# Patient Record
Sex: MALE | Race: WHITE | Employment: FULL TIME | URBAN - NONMETROPOLITAN AREA
[De-identification: names, ages, dates, MRNs, and addresses within clinical notes are randomized per-mention and may not be internally consistent; named-entity substitution may affect disease eponyms.]

---

## 2017-02-06 ENCOUNTER — TELEPHONE (OUTPATIENT)
Dept: FAMILY MEDICINE CLINIC | Age: 52
End: 2017-02-06

## 2017-02-07 ENCOUNTER — OFFICE VISIT (OUTPATIENT)
Dept: FAMILY MEDICINE CLINIC | Age: 52
End: 2017-02-07

## 2017-02-07 ENCOUNTER — TELEPHONE (OUTPATIENT)
Dept: FAMILY MEDICINE CLINIC | Age: 52
End: 2017-02-07

## 2017-02-07 VITALS
RESPIRATION RATE: 16 BRPM | TEMPERATURE: 98.1 F | WEIGHT: 235 LBS | SYSTOLIC BLOOD PRESSURE: 104 MMHG | HEART RATE: 88 BPM | BODY MASS INDEX: 34.7 KG/M2 | DIASTOLIC BLOOD PRESSURE: 64 MMHG

## 2017-02-07 DIAGNOSIS — R05.9 COUGH: ICD-10-CM

## 2017-02-07 DIAGNOSIS — J20.9 ACUTE BRONCHITIS, UNSPECIFIED ORGANISM: Primary | ICD-10-CM

## 2017-02-07 DIAGNOSIS — R68.83 CHILLS: ICD-10-CM

## 2017-02-07 LAB
INFLUENZA A ANTIBODY: NEGATIVE
INFLUENZA B ANTIBODY: NEGATIVE

## 2017-02-07 PROCEDURE — 99213 OFFICE O/P EST LOW 20 MIN: CPT | Performed by: FAMILY MEDICINE

## 2017-02-07 PROCEDURE — 87804 INFLUENZA ASSAY W/OPTIC: CPT | Performed by: FAMILY MEDICINE

## 2017-02-07 RX ORDER — AZITHROMYCIN 250 MG/1
TABLET, FILM COATED ORAL
Qty: 1 PACKET | Refills: 0 | Status: SHIPPED | OUTPATIENT
Start: 2017-02-07 | End: 2017-02-17

## 2017-02-07 ASSESSMENT — ENCOUNTER SYMPTOMS
SORE THROAT: 1
VOMITING: 0
COUGH: 1
DIARRHEA: 0
RHINORRHEA: 1
NAUSEA: 0
WHEEZING: 0
ABDOMINAL PAIN: 0
SINUS PRESSURE: 0

## 2017-02-28 DIAGNOSIS — R79.89 LOW TESTOSTERONE: ICD-10-CM

## 2017-03-09 ENCOUNTER — TELEPHONE (OUTPATIENT)
Dept: FAMILY MEDICINE CLINIC | Age: 52
End: 2017-03-09

## 2017-03-10 ENCOUNTER — OFFICE VISIT (OUTPATIENT)
Dept: FAMILY MEDICINE CLINIC | Age: 52
End: 2017-03-10

## 2017-03-10 VITALS
TEMPERATURE: 98.6 F | SYSTOLIC BLOOD PRESSURE: 118 MMHG | RESPIRATION RATE: 20 BRPM | HEIGHT: 70 IN | HEART RATE: 104 BPM | DIASTOLIC BLOOD PRESSURE: 82 MMHG | WEIGHT: 229.8 LBS | BODY MASS INDEX: 32.9 KG/M2

## 2017-03-10 DIAGNOSIS — J02.9 EXUDATIVE PHARYNGITIS: Primary | ICD-10-CM

## 2017-03-10 DIAGNOSIS — R05.9 COUGH: ICD-10-CM

## 2017-03-10 DIAGNOSIS — R00.0 TACHYCARDIA: ICD-10-CM

## 2017-03-10 DIAGNOSIS — J01.40 ACUTE PANSINUSITIS, RECURRENCE NOT SPECIFIED: ICD-10-CM

## 2017-03-10 DIAGNOSIS — J30.9 ALLERGIC RHINITIS, UNSPECIFIED ALLERGIC RHINITIS TRIGGER, UNSPECIFIED RHINITIS SEASONALITY: ICD-10-CM

## 2017-03-10 PROCEDURE — 99213 OFFICE O/P EST LOW 20 MIN: CPT | Performed by: NURSE PRACTITIONER

## 2017-03-10 RX ORDER — BENZONATATE 100 MG/1
100 CAPSULE ORAL 3 TIMES DAILY PRN
Qty: 21 CAPSULE | Refills: 0 | Status: SHIPPED | OUTPATIENT
Start: 2017-03-10 | End: 2017-03-17

## 2017-03-10 RX ORDER — CEFDINIR 300 MG/1
300 CAPSULE ORAL 2 TIMES DAILY
Qty: 20 CAPSULE | Refills: 0 | Status: SHIPPED | OUTPATIENT
Start: 2017-03-10 | End: 2017-03-20

## 2017-03-10 ASSESSMENT — ENCOUNTER SYMPTOMS
ANAL BLEEDING: 0
SINUS PRESSURE: 1
TROUBLE SWALLOWING: 0
APNEA: 0
NAUSEA: 0
RHINORRHEA: 0
COUGH: 1
BLOOD IN STOOL: 0
CONSTIPATION: 0
ABDOMINAL DISTENTION: 0
DIARRHEA: 0
ABDOMINAL PAIN: 0
SORE THROAT: 1
WHEEZING: 0
VOICE CHANGE: 1
PHOTOPHOBIA: 0
VOMITING: 0
SHORTNESS OF BREATH: 0

## 2017-03-15 ENCOUNTER — TELEPHONE (OUTPATIENT)
Dept: FAMILY MEDICINE CLINIC | Age: 52
End: 2017-03-15

## 2017-03-17 ENCOUNTER — OFFICE VISIT (OUTPATIENT)
Dept: FAMILY MEDICINE CLINIC | Age: 52
End: 2017-03-17

## 2017-03-17 VITALS
SYSTOLIC BLOOD PRESSURE: 122 MMHG | TEMPERATURE: 98.5 F | RESPIRATION RATE: 20 BRPM | WEIGHT: 234.6 LBS | HEART RATE: 108 BPM | HEIGHT: 70 IN | BODY MASS INDEX: 33.58 KG/M2 | DIASTOLIC BLOOD PRESSURE: 66 MMHG

## 2017-03-17 DIAGNOSIS — J32.9 RECURRENT SINUSITIS: Primary | ICD-10-CM

## 2017-03-17 DIAGNOSIS — J02.9 SORE THROAT: ICD-10-CM

## 2017-03-17 PROCEDURE — 99213 OFFICE O/P EST LOW 20 MIN: CPT | Performed by: FAMILY MEDICINE

## 2017-03-17 ASSESSMENT — ENCOUNTER SYMPTOMS
GASTROINTESTINAL NEGATIVE: 1
SORE THROAT: 1
SINUS PRESSURE: 1
TROUBLE SWALLOWING: 0
COUGH: 1

## 2017-03-20 ENCOUNTER — TELEPHONE (OUTPATIENT)
Dept: FAMILY MEDICINE CLINIC | Age: 52
End: 2017-03-20

## 2017-03-20 DIAGNOSIS — J34.9 PARANASAL SINUS DISEASE: Primary | ICD-10-CM

## 2017-03-20 DIAGNOSIS — J32.9 RECURRENT SINUSITIS: ICD-10-CM

## 2017-04-04 LAB
CHOLESTEROL, TOTAL: 162 MG/DL
CHOLESTEROL/HDL RATIO: NORMAL
HDLC SERPL-MCNC: 45 MG/DL (ref 35–70)
LDL CHOLESTEROL CALCULATED: 76 MG/DL (ref 0–160)
TRIGL SERPL-MCNC: 204 MG/DL
VLDLC SERPL CALC-MCNC: NORMAL MG/DL

## 2017-06-01 DIAGNOSIS — I10 ESSENTIAL HYPERTENSION: ICD-10-CM

## 2017-06-01 DIAGNOSIS — E78.00 PURE HYPERCHOLESTEROLEMIA: ICD-10-CM

## 2017-06-01 RX ORDER — SIMVASTATIN 40 MG
40 TABLET ORAL NIGHTLY
Qty: 90 TABLET | Refills: 3 | Status: SHIPPED | OUTPATIENT
Start: 2017-06-01 | End: 2017-06-28 | Stop reason: SDUPTHER

## 2017-06-01 RX ORDER — LOSARTAN POTASSIUM AND HYDROCHLOROTHIAZIDE 12.5; 1 MG/1; MG/1
1 TABLET ORAL DAILY
Qty: 90 TABLET | Refills: 3 | Status: SHIPPED | OUTPATIENT
Start: 2017-06-01 | End: 2017-06-28 | Stop reason: SDUPTHER

## 2017-06-23 ENCOUNTER — OFFICE VISIT (OUTPATIENT)
Dept: FAMILY MEDICINE CLINIC | Age: 52
End: 2017-06-23

## 2017-06-23 VITALS
HEART RATE: 88 BPM | DIASTOLIC BLOOD PRESSURE: 68 MMHG | HEIGHT: 69 IN | TEMPERATURE: 97.9 F | WEIGHT: 231 LBS | RESPIRATION RATE: 16 BRPM | SYSTOLIC BLOOD PRESSURE: 116 MMHG | BODY MASS INDEX: 34.21 KG/M2

## 2017-06-23 DIAGNOSIS — E66.9 OBESITY (BMI 30.0-34.9): ICD-10-CM

## 2017-06-23 DIAGNOSIS — F41.9 ANXIETY: ICD-10-CM

## 2017-06-23 DIAGNOSIS — Z00.00 ANNUAL PHYSICAL EXAM: Primary | ICD-10-CM

## 2017-06-23 PROBLEM — E66.811 OBESITY (BMI 30.0-34.9): Status: ACTIVE | Noted: 2017-06-23

## 2017-06-23 PROCEDURE — 99396 PREV VISIT EST AGE 40-64: CPT | Performed by: FAMILY MEDICINE

## 2017-06-23 RX ORDER — ESCITALOPRAM OXALATE 10 MG/1
10 TABLET ORAL DAILY
Qty: 90 TABLET | Refills: 3 | Status: SHIPPED | OUTPATIENT
Start: 2017-06-23 | End: 2017-06-28 | Stop reason: SDUPTHER

## 2017-06-23 ASSESSMENT — ENCOUNTER SYMPTOMS
SHORTNESS OF BREATH: 0
EYES NEGATIVE: 1
BLOOD IN STOOL: 0
ABDOMINAL PAIN: 0
CHEST TIGHTNESS: 0

## 2017-06-28 ENCOUNTER — TELEPHONE (OUTPATIENT)
Dept: FAMILY MEDICINE CLINIC | Age: 52
End: 2017-06-28

## 2017-06-28 DIAGNOSIS — F41.9 ANXIETY: ICD-10-CM

## 2017-06-28 DIAGNOSIS — I10 ESSENTIAL HYPERTENSION: ICD-10-CM

## 2017-06-28 DIAGNOSIS — E78.00 PURE HYPERCHOLESTEROLEMIA: ICD-10-CM

## 2017-06-28 RX ORDER — ESCITALOPRAM OXALATE 10 MG/1
10 TABLET ORAL DAILY
Qty: 90 TABLET | Refills: 3 | Status: SHIPPED | OUTPATIENT
Start: 2017-06-28 | End: 2017-06-28 | Stop reason: SDUPTHER

## 2017-06-28 RX ORDER — SIMVASTATIN 40 MG
40 TABLET ORAL NIGHTLY
Qty: 90 TABLET | Refills: 3 | Status: SHIPPED | OUTPATIENT
Start: 2017-06-28 | End: 2017-06-28 | Stop reason: SDUPTHER

## 2017-06-28 RX ORDER — LOSARTAN POTASSIUM AND HYDROCHLOROTHIAZIDE 12.5; 1 MG/1; MG/1
1 TABLET ORAL DAILY
Qty: 90 TABLET | Refills: 3 | Status: SHIPPED | OUTPATIENT
Start: 2017-06-28 | End: 2017-06-28 | Stop reason: SDUPTHER

## 2017-06-28 RX ORDER — ESCITALOPRAM OXALATE 10 MG/1
10 TABLET ORAL DAILY
Qty: 30 TABLET | Refills: 0 | Status: SHIPPED | OUTPATIENT
Start: 2017-06-28 | End: 2018-06-14 | Stop reason: SDUPTHER

## 2017-06-28 RX ORDER — SIMVASTATIN 40 MG
40 TABLET ORAL NIGHTLY
Qty: 30 TABLET | Refills: 0 | Status: SHIPPED | OUTPATIENT
Start: 2017-06-28 | End: 2018-05-01 | Stop reason: SDUPTHER

## 2017-06-28 RX ORDER — LOSARTAN POTASSIUM AND HYDROCHLOROTHIAZIDE 12.5; 1 MG/1; MG/1
1 TABLET ORAL DAILY
Qty: 30 TABLET | Refills: 0 | Status: SHIPPED | OUTPATIENT
Start: 2017-06-28 | End: 2018-08-13 | Stop reason: SDUPTHER

## 2017-07-14 ENCOUNTER — OFFICE VISIT (OUTPATIENT)
Dept: FAMILY MEDICINE CLINIC | Age: 52
End: 2017-07-14

## 2017-07-14 ENCOUNTER — TELEPHONE (OUTPATIENT)
Dept: UROLOGY | Age: 52
End: 2017-07-14

## 2017-07-14 VITALS
TEMPERATURE: 98.1 F | DIASTOLIC BLOOD PRESSURE: 68 MMHG | SYSTOLIC BLOOD PRESSURE: 110 MMHG | HEIGHT: 70 IN | BODY MASS INDEX: 33.07 KG/M2 | WEIGHT: 231 LBS | RESPIRATION RATE: 16 BRPM | HEART RATE: 68 BPM

## 2017-07-14 DIAGNOSIS — Z12.5 SCREENING FOR PROSTATE CANCER: ICD-10-CM

## 2017-07-14 DIAGNOSIS — M48.061 LUMBAR SPINAL STENOSIS: Primary | ICD-10-CM

## 2017-07-14 DIAGNOSIS — I10 ESSENTIAL HYPERTENSION: ICD-10-CM

## 2017-07-14 DIAGNOSIS — Z01.818 PREOP EXAMINATION: ICD-10-CM

## 2017-07-14 DIAGNOSIS — R79.89 LOW TESTOSTERONE: Primary | ICD-10-CM

## 2017-07-14 PROCEDURE — 93000 ELECTROCARDIOGRAM COMPLETE: CPT | Performed by: FAMILY MEDICINE

## 2017-07-14 PROCEDURE — 99243 OFF/OP CNSLTJ NEW/EST LOW 30: CPT | Performed by: FAMILY MEDICINE

## 2017-07-14 ASSESSMENT — ENCOUNTER SYMPTOMS
BLOOD IN STOOL: 0
CHEST TIGHTNESS: 0
ABDOMINAL PAIN: 0
BACK PAIN: 1
EYES NEGATIVE: 1
SHORTNESS OF BREATH: 0

## 2017-07-15 ENCOUNTER — HOSPITAL ENCOUNTER (OUTPATIENT)
Age: 52
Discharge: HOME | End: 2017-07-15
Payer: COMMERCIAL

## 2017-07-15 LAB
HEMOGLOBIN: 16 GM/DL (ref 14–18)
PROSTATE SPECIFIC ANTIGEN: 0.68 NG/ML (ref 0–1)

## 2017-07-15 PROCEDURE — 84270 ASSAY OF SEX HORMONE GLOBUL: CPT

## 2017-07-15 PROCEDURE — 84403 ASSAY OF TOTAL TESTOSTERONE: CPT

## 2017-07-15 PROCEDURE — 84153 ASSAY OF PSA TOTAL: CPT

## 2017-07-15 PROCEDURE — 85018 HEMOGLOBIN: CPT

## 2017-07-15 PROCEDURE — 36415 COLL VENOUS BLD VENIPUNCTURE: CPT

## 2017-07-19 LAB — TESTOSTERONE FREE: NORMAL

## 2017-07-21 ENCOUNTER — OFFICE VISIT (OUTPATIENT)
Dept: UROLOGY | Age: 52
End: 2017-07-21
Payer: COMMERCIAL

## 2017-07-21 VITALS
BODY MASS INDEX: 34.36 KG/M2 | SYSTOLIC BLOOD PRESSURE: 128 MMHG | HEIGHT: 69 IN | WEIGHT: 232 LBS | DIASTOLIC BLOOD PRESSURE: 80 MMHG

## 2017-07-21 DIAGNOSIS — E29.1 HYPOGONADISM IN MALE: Primary | ICD-10-CM

## 2017-07-21 DIAGNOSIS — R79.89 LOW TESTOSTERONE: ICD-10-CM

## 2017-07-21 LAB
BILIRUBIN URINE: NEGATIVE
BLOOD URINE, POC: NEGATIVE
CHARACTER, URINE: CLEAR
COLOR, URINE: YELLOW
GLUCOSE URINE: NEGATIVE MG/DL
KETONES, URINE: NEGATIVE
LEUKOCYTE CLUMPS, URINE: NEGATIVE
NITRITE, URINE: NEGATIVE
PH, URINE: 6
PROTEIN, URINE: NEGATIVE MG/DL
SPECIFIC GRAVITY, URINE: 1.02 (ref 1–1.03)
UROBILINOGEN, URINE: 0.2 EU/DL

## 2017-07-21 PROCEDURE — 99213 OFFICE O/P EST LOW 20 MIN: CPT | Performed by: NURSE PRACTITIONER

## 2017-07-21 PROCEDURE — 81003 URINALYSIS AUTO W/O SCOPE: CPT | Performed by: NURSE PRACTITIONER

## 2017-07-21 RX ORDER — SYRINGE W-NEEDLE,DISPOSAB,3 ML 25GX5/8"
SYRINGE, EMPTY DISPOSABLE MISCELLANEOUS
Qty: 3 EACH | Refills: 11 | Status: SHIPPED | OUTPATIENT
Start: 2017-07-21 | End: 2018-07-30 | Stop reason: SDUPTHER

## 2017-07-21 RX ORDER — TESTOSTERONE CYPIONATE 200 MG/ML
300 INJECTION INTRAMUSCULAR SEE ADMIN INSTRUCTIONS
Qty: 10 ML | Refills: 5 | Status: SHIPPED | OUTPATIENT
Start: 2017-07-21 | End: 2017-12-12 | Stop reason: SDUPTHER

## 2017-11-20 ENCOUNTER — HOSPITAL ENCOUNTER (OUTPATIENT)
Age: 52
Discharge: HOME | End: 2017-11-20
Payer: COMMERCIAL

## 2017-11-20 DIAGNOSIS — E29.1 HYPOGONADISM IN MALE: ICD-10-CM

## 2017-11-20 DIAGNOSIS — R79.89 LOW TESTOSTERONE: ICD-10-CM

## 2017-11-20 LAB
HEMOGLOBIN: 17.1 GM/DL (ref 14–18)
PROSTATE SPECIFIC ANTIGEN: 0.52 NG/ML (ref 0–1)

## 2017-11-20 PROCEDURE — 84403 ASSAY OF TOTAL TESTOSTERONE: CPT

## 2017-11-20 PROCEDURE — 85018 HEMOGLOBIN: CPT

## 2017-11-20 PROCEDURE — 84153 ASSAY OF PSA TOTAL: CPT

## 2017-11-20 PROCEDURE — 36415 COLL VENOUS BLD VENIPUNCTURE: CPT

## 2017-11-21 LAB — TESTOSTERONE TOTAL: > 1500 NG/DL (ref 300–890)

## 2017-11-28 ENCOUNTER — TELEPHONE (OUTPATIENT)
Dept: UROLOGY | Age: 52
End: 2017-11-28

## 2017-11-28 NOTE — TELEPHONE ENCOUNTER
Attempted to contact patient regarding testosterone results. Left message for patient to call the office.

## 2017-12-05 ENCOUNTER — TELEPHONE (OUTPATIENT)
Dept: UROLOGY | Age: 52
End: 2017-12-05

## 2017-12-05 DIAGNOSIS — R79.89 LOW TESTOSTERONE: Primary | ICD-10-CM

## 2017-12-05 NOTE — TELEPHONE ENCOUNTER
I want him to obtain repeat Testosterone level 5 days after his next injection. Testosterone ordered.  Thanks    [employer]

## 2017-12-12 ENCOUNTER — TELEPHONE (OUTPATIENT)
Dept: UROLOGY | Age: 52
End: 2017-12-12

## 2017-12-12 DIAGNOSIS — R79.89 LOW TESTOSTERONE: ICD-10-CM

## 2017-12-12 RX ORDER — TESTOSTERONE CYPIONATE 200 MG/ML
300 INJECTION INTRAMUSCULAR SEE ADMIN INSTRUCTIONS
Qty: 10 ML | Refills: 5 | Status: SHIPPED | OUTPATIENT
Start: 2017-12-12 | End: 2018-07-30 | Stop reason: SDUPTHER

## 2017-12-21 ENCOUNTER — TELEPHONE (OUTPATIENT)
Dept: FAMILY MEDICINE CLINIC | Age: 52
End: 2017-12-21

## 2017-12-21 ENCOUNTER — HOSPITAL ENCOUNTER (OUTPATIENT)
Age: 52
Discharge: HOME | End: 2017-12-21
Payer: COMMERCIAL

## 2017-12-21 DIAGNOSIS — R79.89 LOW TESTOSTERONE: ICD-10-CM

## 2017-12-21 PROCEDURE — 84403 ASSAY OF TOTAL TESTOSTERONE: CPT

## 2017-12-21 PROCEDURE — 36415 COLL VENOUS BLD VENIPUNCTURE: CPT

## 2017-12-21 NOTE — TELEPHONE ENCOUNTER
Patients wife niranjan calling in for appointment tomorrow 12/22/17 for the patient. She said he is having urinary issues, they think it is prostatis again. At time of call only freeze/thaws available tomorrow. Please call them back to advise.

## 2017-12-22 ENCOUNTER — OFFICE VISIT (OUTPATIENT)
Dept: FAMILY MEDICINE CLINIC | Age: 52
End: 2017-12-22
Payer: COMMERCIAL

## 2017-12-22 VITALS
RESPIRATION RATE: 16 BRPM | DIASTOLIC BLOOD PRESSURE: 78 MMHG | WEIGHT: 243 LBS | SYSTOLIC BLOOD PRESSURE: 130 MMHG | BODY MASS INDEX: 35.88 KG/M2 | TEMPERATURE: 98.3 F | HEART RATE: 96 BPM

## 2017-12-22 DIAGNOSIS — J34.89 RHINORRHEA: ICD-10-CM

## 2017-12-22 DIAGNOSIS — R05.9 COUGH: ICD-10-CM

## 2017-12-22 DIAGNOSIS — J20.9 ACUTE BRONCHITIS, UNSPECIFIED ORGANISM: Primary | ICD-10-CM

## 2017-12-22 DIAGNOSIS — R35.0 URINARY FREQUENCY: ICD-10-CM

## 2017-12-22 LAB
BILIRUBIN URINE: NEGATIVE
BLOOD URINE, POC: NEGATIVE
CHARACTER, URINE: CLEAR
COLOR, URINE: YELLOW
GLUCOSE URINE: NEGATIVE MG/DL
KETONES, URINE: NEGATIVE
LEUKOCYTE CLUMPS, URINE: NEGATIVE
NITRITE, URINE: NEGATIVE
PH, URINE: 5.5
PROTEIN, URINE: NEGATIVE MG/DL
SPECIFIC GRAVITY, URINE: 1.02 (ref 1–1.03)
TESTOSTERONE TOTAL: 1333 NG/DL (ref 300–890)
UROBILINOGEN, URINE: 0.2 EU/DL

## 2017-12-22 PROCEDURE — 99213 OFFICE O/P EST LOW 20 MIN: CPT | Performed by: NURSE PRACTITIONER

## 2017-12-22 PROCEDURE — 81003 URINALYSIS AUTO W/O SCOPE: CPT | Performed by: NURSE PRACTITIONER

## 2017-12-22 RX ORDER — CETIRIZINE HYDROCHLORIDE 10 MG/1
10 TABLET ORAL DAILY
Qty: 30 TABLET | Refills: 0 | Status: SHIPPED | OUTPATIENT
Start: 2017-12-22

## 2017-12-22 RX ORDER — CEFDINIR 300 MG/1
300 CAPSULE ORAL EVERY 12 HOURS
Qty: 20 CAPSULE | Refills: 0 | Status: SHIPPED | OUTPATIENT
Start: 2017-12-22 | End: 2018-01-01

## 2017-12-22 RX ORDER — BENZONATATE 100 MG/1
100 CAPSULE ORAL 3 TIMES DAILY PRN
Qty: 21 CAPSULE | Refills: 0 | Status: SHIPPED | OUTPATIENT
Start: 2017-12-22 | End: 2017-12-29

## 2017-12-22 ASSESSMENT — PATIENT HEALTH QUESTIONNAIRE - PHQ9
2. FEELING DOWN, DEPRESSED OR HOPELESS: 0
SUM OF ALL RESPONSES TO PHQ9 QUESTIONS 1 & 2: 0
1. LITTLE INTEREST OR PLEASURE IN DOING THINGS: 0
SUM OF ALL RESPONSES TO PHQ QUESTIONS 1-9: 0

## 2017-12-22 ASSESSMENT — ENCOUNTER SYMPTOMS
COUGH: 1
GASTROINTESTINAL NEGATIVE: 1
SHORTNESS OF BREATH: 0
WHEEZING: 0

## 2017-12-22 NOTE — PATIENT INSTRUCTIONS
Orders Placed:  Orders Placed This Encounter   Procedures    POCT Urinalysis No Micro (Auto)     Medications Prescribed:  Orders Placed This Encounter   Medications    cefdinir (OMNICEF) 300 MG capsule     Sig: Take 1 capsule by mouth every 12 hours for 10 days     Dispense:  20 capsule     Refill:  0    cetirizine (ZYRTEC) 10 MG tablet     Sig: Take 1 tablet by mouth daily     Dispense:  30 tablet     Refill:  0    benzonatate (TESSALON) 100 MG capsule     Sig: Take 1 capsule by mouth 3 times daily as needed for Cough     Dispense:  21 capsule     Refill:  0     Take medications as prescribed. Cool mist humidifier at the bedside. A urine specimen was obtained in the office today. Results revealed no abnormal findings as noted above. Results were reviewed with the patient. The patient has been advised to obtain a TDaP vaccine. The patient has been advised that the vaccine can be scheduled by calling [facility] at [phone]. The cost of the vaccine (as of 3/23/15) is $64.00 for a TDaP vaccine and $ 49.00 for a TD vaccine. They are able to bill some insurance companies for the cost of the vaccine. The cost is subject to change, so you should ask about the cost at the time the appointment is scheduled. Another option is to contact a local pharmacy. Obtain a fasting blood sugar as advised. Call with any concerns. Return if symptoms worsen or fail to improve. Patient Education        Bronchitis: Care Instructions  Your Care Instructions    Bronchitis is inflammation of the bronchial tubes, which carry air to the lungs. The tubes swell and produce mucus, or phlegm. The mucus and inflamed bronchial tubes make you cough. You may have trouble breathing. Most cases of bronchitis are caused by viruses like those that cause colds. Antibiotics usually do not help and they may be harmful.   Bronchitis usually develops rapidly and lasts about 2 to 3 weeks in otherwise healthy people. Follow-up care is a key part of your treatment and safety. Be sure to make and go to all appointments, and call your doctor if you are having problems. It's also a good idea to know your test results and keep a list of the medicines you take. How can you care for yourself at home? · Take all medicines exactly as prescribed. Call your doctor if you think you are having a problem with your medicine. · Get some extra rest.  · Take an over-the-counter pain medicine, such as acetaminophen (Tylenol), ibuprofen (Advil, Motrin), or naproxen (Aleve) to reduce fever and relieve body aches. Read and follow all instructions on the label. · Do not take two or more pain medicines at the same time unless the doctor told you to. Many pain medicines have acetaminophen, which is Tylenol. Too much acetaminophen (Tylenol) can be harmful. · Take an over-the-counter cough medicine that contains dextromethorphan to help quiet a dry, hacking cough so that you can sleep. Avoid cough medicines that have more than one active ingredient. Read and follow all instructions on the label. · Breathe moist air from a humidifier, hot shower, or sink filled with hot water. The heat and moisture will thin mucus so you can cough it out. · Do not smoke. Smoking can make bronchitis worse. If you need help quitting, talk to your doctor about stop-smoking programs and medicines. These can increase your chances of quitting for good. When should you call for help? Call 911 anytime you think you may need emergency care. For example, call if:  ? · You have severe trouble breathing. ?Call your doctor now or seek immediate medical care if:  ? · You have new or worse trouble breathing. ? · You cough up dark brown or bloody mucus (sputum). ? · You have a new or higher fever. ? · You have a new rash. ? Watch closely for changes in your health, and be sure to contact your doctor if:  ? · You cough more deeply or more often, cough. Medicine-flavored cough drops are no better than candy-flavored drops or hard candy. · Do not smoke. Avoid secondhand smoke. If you need help quitting, talk to your doctor about stop-smoking programs and medicines. These can increase your chances of quitting for good. When should you call for help? Call 911 anytime you think you may need emergency care. For example, call if:  ? · You have severe trouble breathing. ?Call your doctor now or seek immediate medical care if:  ? · You cough up blood. ? · You have new or worse trouble breathing. ? · You have a new or higher fever. ? · You have a new rash. ? Watch closely for changes in your health, and be sure to contact your doctor if:  ? · You cough more deeply or more often, especially if you notice more mucus or a change in the color of your mucus. ? · You have new symptoms, such as a sore throat, an earache, or sinus pain. ? · You do not get better as expected. Where can you learn more? Go to https://Transfer To.[employer]. org and sign in to your [employer] account. Enter D279 in the [facility] box to learn more about \"Cough: Care Instructions. \"     If you do not have an account, please click on the \"Sign Up Now\" link. Current as of: May 12, 2017  Content Version: 11.4  © 8538-4527 Healthwise, Incorporated. Care instructions adapted under license by [facility] ([facility]). If you have questions about a medical condition or this instruction, always ask your healthcare professional. [place] [address] any warranty or liability for your use of this information.

## 2017-12-22 NOTE — PROGRESS NOTES
Yulisa Blanco is a 47 y.o.male is here today for:  Chief Complaint   Patient presents with    Urinary Frequency     C/O urinary frequency x 2 weeks, no other symptoms. Has a history of prostatitis.  Cough     C/O cough x 1 week, no fever. Tried Robitussin. The patient is here today with a complaint of urinary frequency and cough. The patient has been afebrile. He has taken Robitussin without improvement. Duration of symptoms:  2 weeks for the urinary sxs and 1 week for the cough    Review of Systems   Constitutional: Negative for chills and fever. HENT: Positive for sneezing. Respiratory: Positive for cough. Negative for shortness of breath and wheezing. Gastrointestinal: Negative. Genitourinary: Positive for frequency. Negative for dysuria, flank pain, hematuria and urgency. No nocturia   Allergic/Immunologic: Negative for immunocompromised state. Neurological: Negative for dizziness and headaches. Hematological: Negative for adenopathy. OBJECTIVE     /78 (Site: Left Arm, Position: Sitting, Cuff Size: Large Adult)   Pulse 96   Temp 98.3 °F (36.8 °C) (Oral)   Resp 16   Wt 243 lb (110.2 kg)   BMI 35.88 kg/m²     Body mass index is 35.88 kg/m². Physical Exam   Constitutional: He is oriented to person, place, and time. He appears well-developed and well-nourished. HENT:   Head: Normocephalic and atraumatic. Right Ear: External ear normal.   Left Ear: External ear normal.   Mouth/Throat: Oropharynx is clear and moist. No oropharyngeal exudate. Posterior pharynx erythematous without exudate. Nasal turbinates erythematous and edematous. Eyes: Conjunctivae and EOM are normal. Pupils are equal, round, and reactive to light. No scleral icterus. Neck: Normal range of motion. Neck supple. No JVD present. No thyromegaly present. Cardiovascular: Normal rate, regular rhythm and normal heart sounds.   Exam reveals no gallop and no friction rub. No murmur heard. Pulmonary/Chest: Effort normal. No respiratory distress. Faint rhonchi. Abdominal: Soft. Bowel sounds are normal. He exhibits no distension and no mass. There is no tenderness. Genitourinary: Rectum normal and prostate normal.   Genitourinary Comments: Prostate gland normal size, no tender with no palpable nodules and no abnormal firmness/texture. Musculoskeletal: Normal range of motion. He exhibits no edema or tenderness. No CVA tenderness to percussion. Lymphadenopathy:     He has no cervical adenopathy. Neurological: He is alert and oriented to person, place, and time. Coordination normal.   Skin: Skin is warm and dry. No rash noted. Psychiatric: He has a normal mood and affect. His behavior is normal.   Nursing note and vitals reviewed. Results for orders placed or performed in visit on 12/22/17   POCT Urinalysis No Micro (Auto)   Result Value Ref Range    Glucose, Ur Negative NEGATIVE mg/dl    Bilirubin Urine Negative     Ketones, Urine Negative NEGATIVE    Specific Gravity, Urine 1.020 1.002 - 1.03    Blood, UA POC Negative NEGATIVE    pH, Urine 5.50 5.0 - 9.0    Protein, Urine Negative NEGATIVE mg/dl    Urobilinogen, Urine 0.20 0.0 - 1.0 eu/dl    Nitrite, Urine Negative NEGATIVE    Leukocyte Clumps, Urine Negative NEGATIVE    Color, Urine Yellow YELLOW-STR    Character, Urine Clear [id number]       ASSESSMENT      1. Acute bronchitis, unspecified organism  cefdinir (OMNICEF) 300 MG capsule   2. Urinary frequency  POCT Urinalysis No Micro (Auto)    Glucose   3. Cough  benzonatate (TESSALON) 100 MG capsule   4.  Rhinorrhea  cetirizine (ZYRTEC) 10 MG tablet       PLAN           Orders Placed:  Orders Placed This Encounter   Procedures    Glucose     Standing Status:   Future     Standing Expiration Date:   12/22/2018    POCT Urinalysis No Micro (Auto)     Medications Prescribed:  Orders Placed This Encounter   Medications    cefdinir (OMNICEF) 300 MG capsule     Sig: Take 1 capsule by mouth every 12 hours for 10 days     Dispense:  20 capsule     Refill:  0    cetirizine (ZYRTEC) 10 MG tablet     Sig: Take 1 tablet by mouth daily     Dispense:  30 tablet     Refill:  0    benzonatate (TESSALON) 100 MG capsule     Sig: Take 1 capsule by mouth 3 times daily as needed for Cough     Dispense:  21 capsule     Refill:  0     Take medications as prescribed. Cool mist humidifier at the bedside. A urine specimen was obtained in the office today. Results revealed no abnormal findings as noted above. Results were reviewed with the patient. The patient has been advised to obtain a TDaP vaccine. The patient has been advised that the vaccine can be scheduled by calling [facility] at [phone]. The cost of the vaccine (as of 3/23/15) is $64.00 for a TDaP vaccine and $ 49.00 for a TD vaccine. They are able to bill some insurance companies for the cost of the vaccine. The cost is subject to change, so you should ask about the cost at the time the appointment is scheduled. Another option is to contact a local pharmacy. Obtain a fasting blood sugar as advised. Call with any concerns. Return if symptoms worsen or fail to improve.        Electronically signed by Jocelyne Mathew CNP on 12/22/2017 at 10:18 AM

## 2017-12-26 ENCOUNTER — HOSPITAL ENCOUNTER (OUTPATIENT)
Age: 52
Discharge: HOME | End: 2017-12-26
Payer: COMMERCIAL

## 2017-12-26 DIAGNOSIS — R35.0 URINARY FREQUENCY: ICD-10-CM

## 2017-12-26 LAB — GLUCOSE BLD-MCNC: 109 MG/DL (ref 70–108)

## 2017-12-26 PROCEDURE — 82947 ASSAY GLUCOSE BLOOD QUANT: CPT

## 2017-12-26 PROCEDURE — 36415 COLL VENOUS BLD VENIPUNCTURE: CPT

## 2017-12-27 ENCOUNTER — TELEPHONE (OUTPATIENT)
Dept: FAMILY MEDICINE CLINIC | Age: 52
End: 2017-12-27

## 2017-12-27 ENCOUNTER — TELEPHONE (OUTPATIENT)
Dept: UROLOGY | Age: 52
End: 2017-12-27

## 2017-12-27 DIAGNOSIS — R73.9 HYPERGLYCEMIA: Primary | ICD-10-CM

## 2017-12-27 NOTE — TELEPHONE ENCOUNTER
----- Message from Junaid Traylor CNP sent at 12/27/2017  7:40 AM EST -----  Glucose minimally elevated at 109. Please check hemoglobin A1C. Follow a low carbohydrate diet.    ~BK

## 2017-12-29 ENCOUNTER — HOSPITAL ENCOUNTER (OUTPATIENT)
Age: 52
Discharge: HOME | End: 2017-12-29
Payer: COMMERCIAL

## 2017-12-29 DIAGNOSIS — R73.9 HYPERGLYCEMIA: ICD-10-CM

## 2017-12-29 LAB
AVERAGE GLUCOSE: 114 MG/DL (ref 70–126)
HBA1C MFR BLD: 5.8 % (ref 4.4–6.4)

## 2017-12-29 PROCEDURE — 83036 HEMOGLOBIN GLYCOSYLATED A1C: CPT

## 2017-12-29 PROCEDURE — 36415 COLL VENOUS BLD VENIPUNCTURE: CPT

## 2018-01-02 ENCOUNTER — TELEPHONE (OUTPATIENT)
Dept: FAMILY MEDICINE CLINIC | Age: 53
End: 2018-01-02

## 2018-01-02 DIAGNOSIS — R35.0 URINARY FREQUENCY: ICD-10-CM

## 2018-01-02 DIAGNOSIS — N41.0 ACUTE PROSTATITIS: ICD-10-CM

## 2018-01-02 RX ORDER — TAMSULOSIN HYDROCHLORIDE 0.4 MG/1
0.4 CAPSULE ORAL DAILY
Qty: 30 CAPSULE | Refills: 0 | Status: SHIPPED | OUTPATIENT
Start: 2018-01-02 | End: 2018-08-13 | Stop reason: SDUPTHER

## 2018-01-02 RX ORDER — CIPROFLOXACIN 500 MG/1
500 TABLET, FILM COATED ORAL 2 TIMES DAILY
Qty: 28 TABLET | Refills: 0 | Status: SHIPPED | OUTPATIENT
Start: 2018-01-02 | End: 2019-02-15 | Stop reason: SDUPTHER

## 2018-01-02 NOTE — TELEPHONE ENCOUNTER
Patient's wife, Sloane Knapp, called. States that he [facility] week when you were out for prostatitis. She says that the medication that he was given is not helping at all. She says that it wasn't the same thing that you usually give him for this and she wants to know if you could send in a Rx for what you usually give him for this? They use [facility] [facility].   Please call him back at [phone] only if any problems

## 2018-01-11 ENCOUNTER — TELEPHONE (OUTPATIENT)
Dept: FAMILY MEDICINE CLINIC | Age: 53
End: 2018-01-11

## 2018-01-11 ENCOUNTER — TELEPHONE (OUTPATIENT)
Dept: UROLOGY | Age: 53
End: 2018-01-11

## 2018-01-11 NOTE — TELEPHONE ENCOUNTER
Renee Wilhelm called back. Pt is currently seeing NP at Urology office. They are wanting to see a physician instead of PA. Notified her to call [facility] Urology and speak to the nurse at the office regarding seeing a physician vs NP. She will call the office.

## 2018-01-11 NOTE — TELEPHONE ENCOUNTER
Patient's wife Tomas Frankel called. She states that he is still having problems with his prostate and would like to be referred to a urologist for this. She said they don't have anyone that they prefer, just whoever you recommend. They also have new insurance, [employer] since the first of the year.   Please call Jose Samaniegoel back at [phone]

## 2018-01-11 NOTE — TELEPHONE ENCOUNTER
Isn't he already seeing a urologist for his testosterone? He should be able to have this addressed at the same office.   MILLER

## 2018-04-24 ENCOUNTER — TELEPHONE (OUTPATIENT)
Dept: FAMILY MEDICINE CLINIC | Age: 53
End: 2018-04-24

## 2018-04-27 ENCOUNTER — OFFICE VISIT (OUTPATIENT)
Dept: FAMILY MEDICINE CLINIC | Age: 53
End: 2018-04-27
Payer: COMMERCIAL

## 2018-04-27 VITALS
RESPIRATION RATE: 16 BRPM | WEIGHT: 238 LBS | HEART RATE: 76 BPM | SYSTOLIC BLOOD PRESSURE: 118 MMHG | DIASTOLIC BLOOD PRESSURE: 76 MMHG | BODY MASS INDEX: 35.15 KG/M2 | TEMPERATURE: 98.2 F

## 2018-04-27 DIAGNOSIS — N41.0 ACUTE PROSTATITIS: Primary | ICD-10-CM

## 2018-04-27 PROCEDURE — 1036F TOBACCO NON-USER: CPT | Performed by: FAMILY MEDICINE

## 2018-04-27 PROCEDURE — G8417 CALC BMI ABV UP PARAM F/U: HCPCS | Performed by: FAMILY MEDICINE

## 2018-04-27 PROCEDURE — 3017F COLORECTAL CA SCREEN DOC REV: CPT | Performed by: FAMILY MEDICINE

## 2018-04-27 PROCEDURE — G8427 DOCREV CUR MEDS BY ELIG CLIN: HCPCS | Performed by: FAMILY MEDICINE

## 2018-04-27 PROCEDURE — 99213 OFFICE O/P EST LOW 20 MIN: CPT | Performed by: FAMILY MEDICINE

## 2018-04-27 RX ORDER — CIPROFLOXACIN 500 MG/1
500 TABLET, FILM COATED ORAL 2 TIMES DAILY
Qty: 28 TABLET | Refills: 0 | Status: SHIPPED | OUTPATIENT
Start: 2018-04-27 | End: 2018-09-25 | Stop reason: SDUPTHER

## 2018-04-27 ASSESSMENT — ENCOUNTER SYMPTOMS
RESPIRATORY NEGATIVE: 1
ABDOMINAL PAIN: 0

## 2018-05-01 DIAGNOSIS — E78.00 PURE HYPERCHOLESTEROLEMIA: ICD-10-CM

## 2018-05-01 RX ORDER — SIMVASTATIN 40 MG
40 TABLET ORAL NIGHTLY
Qty: 7 TABLET | Refills: 0 | Status: SHIPPED | OUTPATIENT
Start: 2018-05-01 | End: 2018-08-13 | Stop reason: SDUPTHER

## 2018-06-14 DIAGNOSIS — F41.9 ANXIETY: ICD-10-CM

## 2018-06-14 RX ORDER — ESCITALOPRAM OXALATE 10 MG/1
10 TABLET ORAL DAILY
Qty: 30 TABLET | Refills: 0 | Status: SHIPPED | OUTPATIENT
Start: 2018-06-14 | End: 2018-07-10 | Stop reason: SDUPTHER

## 2018-07-10 DIAGNOSIS — F41.9 ANXIETY: ICD-10-CM

## 2018-07-10 RX ORDER — ESCITALOPRAM OXALATE 10 MG/1
10 TABLET ORAL DAILY
Qty: 30 TABLET | Refills: 0 | Status: SHIPPED | OUTPATIENT
Start: 2018-07-10 | End: 2018-08-13 | Stop reason: SDUPTHER

## 2018-07-10 NOTE — TELEPHONE ENCOUNTER
Sierra Morales called requesting a refill on the following medications:  Requested Prescriptions     Pending Prescriptions Disp Refills    escitalopram (LEXAPRO) 10 MG tablet 30 tablet 0     Sig: Take 1 tablet by mouth daily     Pharmacy verified: [facility].  +++++pt was scheduled 7/3 and 7/19,canceled appts, I offered several openings but he had other plans. Wife insists that this medication be filled for another month, to get him to the next appt.       Date of last visit: 4/27/18  Date of next visit (if applicable): 0/62/9981

## 2018-07-30 ENCOUNTER — TELEPHONE (OUTPATIENT)
Dept: UROLOGY | Age: 53
End: 2018-07-30

## 2018-07-30 DIAGNOSIS — R79.89 LOW TESTOSTERONE: ICD-10-CM

## 2018-07-30 RX ORDER — TESTOSTERONE CYPIONATE 200 MG/ML
300 INJECTION INTRAMUSCULAR SEE ADMIN INSTRUCTIONS
Qty: 10 ML | Refills: 0 | Status: CANCELLED | OUTPATIENT
Start: 2018-07-30

## 2018-07-30 RX ORDER — SYRINGE W-NEEDLE,DISPOSAB,3 ML 25GX5/8"
SYRINGE, EMPTY DISPOSABLE MISCELLANEOUS
Qty: 3 EACH | Refills: 2 | Status: SHIPPED | OUTPATIENT
Start: 2018-07-30 | End: 2018-09-11 | Stop reason: SDUPTHER

## 2018-07-30 RX ORDER — TESTOSTERONE CYPIONATE 200 MG/ML
INJECTION INTRAMUSCULAR
Qty: 1 ML | Refills: 3 | Status: SHIPPED | OUTPATIENT
Start: 2018-07-30 | End: 2018-09-11 | Stop reason: SDUPTHER

## 2018-07-30 NOTE — TELEPHONE ENCOUNTER
Yes, please make sure he gets blood work obtained. Very important, no more refills until blood work and f/u appt.  Thanks    [employer]

## 2018-08-13 ENCOUNTER — OFFICE VISIT (OUTPATIENT)
Dept: FAMILY MEDICINE CLINIC | Age: 53
End: 2018-08-13
Payer: COMMERCIAL

## 2018-08-13 VITALS
HEIGHT: 70 IN | BODY MASS INDEX: 34.07 KG/M2 | SYSTOLIC BLOOD PRESSURE: 120 MMHG | WEIGHT: 238 LBS | HEART RATE: 80 BPM | DIASTOLIC BLOOD PRESSURE: 68 MMHG | RESPIRATION RATE: 12 BRPM

## 2018-08-13 DIAGNOSIS — E78.00 PURE HYPERCHOLESTEROLEMIA: ICD-10-CM

## 2018-08-13 DIAGNOSIS — F41.9 ANXIETY: ICD-10-CM

## 2018-08-13 DIAGNOSIS — Z00.00 ANNUAL PHYSICAL EXAM: Primary | ICD-10-CM

## 2018-08-13 DIAGNOSIS — I10 ESSENTIAL HYPERTENSION: ICD-10-CM

## 2018-08-13 PROCEDURE — 99396 PREV VISIT EST AGE 40-64: CPT | Performed by: FAMILY MEDICINE

## 2018-08-13 RX ORDER — ESCITALOPRAM OXALATE 10 MG/1
10 TABLET ORAL DAILY
Qty: 90 TABLET | Refills: 3 | Status: SHIPPED | OUTPATIENT
Start: 2018-08-13 | End: 2019-08-20 | Stop reason: SDUPTHER

## 2018-08-13 RX ORDER — SIMVASTATIN 40 MG
40 TABLET ORAL NIGHTLY
Qty: 90 TABLET | Refills: 3 | Status: SHIPPED | OUTPATIENT
Start: 2018-08-13 | End: 2019-08-20 | Stop reason: SDUPTHER

## 2018-08-13 RX ORDER — LOSARTAN POTASSIUM AND HYDROCHLOROTHIAZIDE 12.5; 1 MG/1; MG/1
1 TABLET ORAL DAILY
Qty: 90 TABLET | Refills: 3 | Status: SHIPPED | OUTPATIENT
Start: 2018-08-13 | End: 2019-01-02 | Stop reason: SDUPTHER

## 2018-08-13 RX ORDER — TAMSULOSIN HYDROCHLORIDE 0.4 MG/1
0.4 CAPSULE ORAL DAILY
Qty: 90 CAPSULE | Refills: 3 | Status: SHIPPED | OUTPATIENT
Start: 2018-08-13 | End: 2019-08-20 | Stop reason: SDUPTHER

## 2018-08-13 RX ORDER — CETIRIZINE HYDROCHLORIDE 10 MG/1
10 TABLET ORAL DAILY
Qty: 30 TABLET | Refills: 0 | Status: CANCELLED | OUTPATIENT
Start: 2018-08-13

## 2018-08-13 ASSESSMENT — ENCOUNTER SYMPTOMS
ABDOMINAL PAIN: 0
CHEST TIGHTNESS: 0
EYES NEGATIVE: 1
BLOOD IN STOOL: 0
SHORTNESS OF BREATH: 0

## 2018-08-13 NOTE — PROGRESS NOTES
Chief Complaint   Patient presents with    Annual Exam     insurance physical-no forms needed       SUBJECTIVE     Gerri Luna is a 47 y.o.male    Pt presents for annual wellness physical exam.        Pt stable since last visit- no new problems for diagnoses listed below:  Patient Active Problem List   Diagnosis    Low testosterone    HTN (hypertension)    Hyperlipidemia    Allergic rhinitis    Hemorrhoid    Obesity (BMI 30.0-34. 9)    Anxiety     Doing well. No problems or concerns. Needs wellness exam and labs for insurance. BPs stable. Takes all meds as directed and denies side effects. No recent illnesses or hospitalizations. Sees urology regularly. Recently had colonoscopy. No changes in fam hx. Nonsmoker. Body mass index is 34.15 kg/m². He remains active and exercises. Review of Systems   Constitutional: Negative for chills, fatigue, fever and unexpected weight change. HENT: Negative. Eyes: Negative. Respiratory: Negative for chest tightness and shortness of breath. Cardiovascular: Negative for chest pain, palpitations and leg swelling. Gastrointestinal: Negative for abdominal pain and blood in stool. Genitourinary: Negative for dysuria. Musculoskeletal: Negative for joint swelling. Skin: Negative for rash. Neurological: Negative for dizziness. Psychiatric/Behavioral: Negative. All other systems reviewed and are negative. OBJECTIVE     /68 (Site: Right Arm, Position: Sitting, Cuff Size: Large Adult)   Pulse 80   Resp 12   Ht 5' 10\" (1.778 m)   Wt 238 lb (108 kg)   BMI 34.15 kg/m²     Wt Readings from Last 3 Encounters:   08/13/18 238 lb (108 kg)   04/27/18 238 lb (108 kg)   12/22/17 243 lb (110.2 kg)       Physical Exam   Constitutional: He is oriented to person, place, and time. He appears well-developed and well-nourished. HENT:   Head: Normocephalic and atraumatic.    Right Ear: Tympanic membrane normal.   Left Ear: Tympanic membrane normal.   Mouth/Throat: Oropharynx is clear and moist.   Eyes: Conjunctivae are normal.   Neck: Neck supple. Carotid bruit is not present. No thyromegaly present. Cardiovascular: Normal rate, regular rhythm and normal heart sounds. No murmur heard. Pulmonary/Chest: Effort normal and breath sounds normal. He has no wheezes. Abdominal: Soft. Bowel sounds are normal. There is no tenderness. Musculoskeletal: He exhibits no edema. Lymphadenopathy:     He has no cervical adenopathy. Neurological: He is alert and oriented to person, place, and time. Skin: Skin is warm and dry. No rash noted. Psychiatric: He has a normal mood and affect. His behavior is normal.   Vitals reviewed. Immunization History   Administered Date(s) Administered    Influenza A (H5n1) Monovalent Vaccine, Adjuvanted 12/06/2017         Health Maintenance   Topic Date Due    DTaP/Tdap/Td vaccine (1 - Tdap) 08/16/1983    Shingles Vaccine (1 of 2 - 2 Dose Series) 08/16/2014    Hepatitis C screen  06/23/2022 (Originally 1965)    HIV screen  06/23/2022 (Originally [birth year]/16/1979)    Flu vaccine (1) 09/01/2018    PSA counseling  11/20/2018    A1C test (Diabetic or Prediabetic)  12/29/2018    Colon cancer screen colonoscopy  07/30/2020    Lipid screen  04/04/2022         ASSESSMENT      1. Annual physical exam    2. Anxiety    3. Pure hypercholesterolemia    4.  Essential hypertension        PLAN        Orders Placed This Encounter   Procedures    Lipid Panel     Standing Status:   Future     Standing Expiration Date:   8/13/2019     Order Specific Question:   Is Patient Fasting?/# of Hours     Answer:   12    Comprehensive Metabolic Panel     Standing Status:   Future     Standing Expiration Date:   8/13/2019       Requested Prescriptions     Signed Prescriptions Disp Refills    escitalopram (LEXAPRO) 10 MG tablet 90 tablet 3     Sig: Take 1 tablet by mouth daily    simvastatin (ZOCOR) 40 MG tablet 90 tablet 3 Sig: Take 1 tablet by mouth nightly    tamsulosin (FLOMAX) 0.4 MG capsule 90 capsule 3     Sig: Take 1 capsule by mouth daily    losartan-hydrochlorothiazide (HYZAAR) 100-12.5 MG per tablet 90 tablet 3     Sig: Take 1 tablet by mouth daily       Ryan received counseling on the following healthy behaviors: nutrition, exercise and medication adherence    I have instructed Idalia Alexander to complete a self tracking handout on Blood Pressures  and instructed them to bring it with them to his next appointment. Discussed use, benefit, and side effects of prescribed medications. Barriers to medication compliance addressed. All patient questions answered. Pt voiced understanding.          Electronically signed by Delmar Matos MD on 8/13/2018 at 5:04 PM

## 2018-08-21 DIAGNOSIS — R79.89 LOW TESTOSTERONE: Primary | ICD-10-CM

## 2018-08-22 ENCOUNTER — HOSPITAL ENCOUNTER (OUTPATIENT)
Age: 53
Discharge: HOME | End: 2018-08-22
Payer: COMMERCIAL

## 2018-08-22 DIAGNOSIS — Z00.00 ANNUAL PHYSICAL EXAM: ICD-10-CM

## 2018-08-22 LAB
ALBUMIN SERPL-MCNC: 4.4 G/DL (ref 3.5–5.1)
ALP BLD-CCNC: 69 U/L (ref 38–126)
ALT SERPL-CCNC: 30 U/L (ref 11–66)
ANION GAP SERPL CALCULATED.3IONS-SCNC: 12 MEQ/L (ref 8–16)
AST SERPL-CCNC: 26 U/L (ref 5–40)
BILIRUB SERPL-MCNC: 0.9 MG/DL (ref 0.3–1.2)
BUN BLDV-MCNC: 18 MG/DL (ref 7–22)
CALCIUM SERPL-MCNC: 9.5 MG/DL (ref 8.5–10.5)
CHLORIDE BLD-SCNC: 102 MEQ/L (ref 98–111)
CHOLESTEROL, TOTAL: 171 MG/DL (ref 100–199)
CO2: 25 MEQ/L (ref 23–33)
CREAT SERPL-MCNC: 1 MG/DL (ref 0.4–1.2)
GFR SERPL CREATININE-BSD FRML MDRD: 78 ML/MIN/1.73M2
GLUCOSE BLD-MCNC: 91 MG/DL (ref 70–108)
HDLC SERPL-MCNC: 52 MG/DL
LDL CHOLESTEROL CALCULATED: 76 MG/DL
POTASSIUM SERPL-SCNC: 4 MEQ/L (ref 3.5–5.2)
SODIUM BLD-SCNC: 139 MEQ/L (ref 135–145)
TOTAL PROTEIN: 7.4 G/DL (ref 6.1–8)
TRIGL SERPL-MCNC: 214 MG/DL (ref 0–199)

## 2018-08-22 PROCEDURE — 80061 LIPID PANEL: CPT

## 2018-08-22 PROCEDURE — 36415 COLL VENOUS BLD VENIPUNCTURE: CPT

## 2018-08-22 PROCEDURE — 80053 COMPREHEN METABOLIC PANEL: CPT

## 2018-08-23 ENCOUNTER — TELEPHONE (OUTPATIENT)
Dept: FAMILY MEDICINE CLINIC | Age: 53
End: 2018-08-23

## 2018-08-23 NOTE — TELEPHONE ENCOUNTER
Attempted to call pt, no answer and VM box is full    ----- Message from Sandeep French MD sent at 8/22/2018 10:10 PM EDT -----  Lipids and CMP ok. Please notify him.   CG

## 2018-08-24 NOTE — TELEPHONE ENCOUNTER
Attempted to call again. Still no answer and VM still not available. [employer] message sent instead.

## 2018-09-05 ENCOUNTER — HOSPITAL ENCOUNTER (OUTPATIENT)
Age: 53
Discharge: HOME | End: 2018-09-05
Payer: COMMERCIAL

## 2018-09-05 DIAGNOSIS — R79.89 LOW TESTOSTERONE: ICD-10-CM

## 2018-09-05 PROCEDURE — 84270 ASSAY OF SEX HORMONE GLOBUL: CPT

## 2018-09-05 PROCEDURE — 36415 COLL VENOUS BLD VENIPUNCTURE: CPT

## 2018-09-05 PROCEDURE — 84403 ASSAY OF TOTAL TESTOSTERONE: CPT

## 2018-09-07 LAB — TESTOSTERONE FREE: NORMAL

## 2018-09-11 ENCOUNTER — OFFICE VISIT (OUTPATIENT)
Dept: UROLOGY | Age: 53
End: 2018-09-11
Payer: COMMERCIAL

## 2018-09-11 VITALS
BODY MASS INDEX: 35.7 KG/M2 | SYSTOLIC BLOOD PRESSURE: 130 MMHG | DIASTOLIC BLOOD PRESSURE: 80 MMHG | WEIGHT: 241 LBS | HEIGHT: 69 IN

## 2018-09-11 DIAGNOSIS — E34.9 TESTOSTERONE DEFICIENCY: Primary | ICD-10-CM

## 2018-09-11 PROCEDURE — 99213 OFFICE O/P EST LOW 20 MIN: CPT | Performed by: NURSE PRACTITIONER

## 2018-09-11 PROCEDURE — 81003 URINALYSIS AUTO W/O SCOPE: CPT | Performed by: NURSE PRACTITIONER

## 2018-09-11 RX ORDER — TESTOSTERONE CYPIONATE 200 MG/ML
INJECTION INTRAMUSCULAR
Qty: 10 ML | Refills: 3 | Status: SHIPPED | OUTPATIENT
Start: 2018-09-11 | End: 2018-11-19 | Stop reason: SDUPTHER

## 2018-09-11 RX ORDER — SYRINGE W-NEEDLE,DISPOSAB,3 ML 25GX5/8"
SYRINGE, EMPTY DISPOSABLE MISCELLANEOUS
Qty: 3 EACH | Refills: 2 | Status: SHIPPED | OUTPATIENT
Start: 2018-09-11 | End: 2019-09-10 | Stop reason: SDUPTHER

## 2018-09-11 NOTE — PROGRESS NOTES
[address] Elana Suazo  Dept: [phone]  Loc: [phone]  Visit Date: 9/11/2018      HPI:     Tricia Ortizers f/u today for Testosterone Deficiency. His most recent Testosterone level was 457 on 09/2018. He is currently on Testosterone injections 200 mg every 10 days. He does report improvement in symptoms of sex drive, energy, fatigue and erections, however feels there is room for improvement. Denies any side effects to the injections. Has sleep apnea, well controlled with CPAP machine. PSA has been monitored, was 0.68 on 07/15/17. Was 0.37 1 year prior. Reports weak urinary stream, frequency & incomplete bladder emptying, follows with Urologist in [place], has him on Flomax  Emeterio Smalls comes in by himself . History is obtained from the patient and the medical record. Current Outpatient Prescriptions   Medication Sig Dispense Refill    testosterone cypionate (DEPOTESTOTERONE CYPIONATE) 200 MG/ML injection Inject 1.25 ml in to the muscle every 10 days. 10 mL 3    Syringe/Needle, Disp, (SYRINGE 3CC/22GX1\") 22G X 1\" 3 ML MISC Used to inject Testosterone every 10 days 3 each 2    escitalopram (LEXAPRO) 10 MG tablet Take 1 tablet by mouth daily 90 tablet 3    simvastatin (ZOCOR) 40 MG tablet Take 1 tablet by mouth nightly 90 tablet 3    tamsulosin (FLOMAX) 0.4 MG capsule Take 1 capsule by mouth daily 90 capsule 3    losartan-hydrochlorothiazide (HYZAAR) 100-12.5 MG per tablet Take 1 tablet by mouth daily 90 tablet 3    cetirizine (ZYRTEC) 10 MG tablet Take 1 tablet by mouth daily 30 tablet 0    Esomeprazole Magnesium (NEXIUM PO) Take by mouth as needed OTC      ibuprofen (ADVIL;MOTRIN) 800 MG tablet Take 1 tablet by mouth every 8 hours as needed for Pain 60 tablet 0     No current facility-administered medications for this visit.         Past Medical History  Emeterio Smalls  has a past medical history of Allergic rhinitis; BPH (benign prostatic hyperplasia); Hyperlipidemia; Hypertension; Hypogonadism; Hypogonadism male; Inflammatory spondylopathies ([address] and Testosterone deficiency. Past Surgical History  The patient  has a past surgical history that includes Elbow surgery (8/11); sinus surgery; other surgical history (age 15); Cervical spine surgery (06/01/2015); Colonoscopy (2011); Carpal tunnel release (Left, 2015); lumbar fusion (08/2017); and back surgery (08/05/2018). Family History  This patient's family history includes Cancer in his father; High Blood Pressure in his mother. Social History  Shivani Villatoro  reports that he has never smoked. He has never used smokeless tobacco. He reports that he drinks alcohol. He reports that he does not use drugs. Subjective:     Review of Systems  No problems with ears, nose or throat. No problems with eyes. No chest pain, shortness of breath, abdominal pain, extremity pain or weakness, and no neurological deficits. No rashes. No swollen glands or lymph nodes.  symptoms per HPI. The remainder of the review of symptoms is negative. Objective:     PE:   Vitals:    09/11/18 1508   BP: 130/80   Weight: 241 lb (109.3 kg)   Height: 5' 9\" (1.753 m)       Constitutional: Alert and oriented times 3, no acute distress and cooperative to examination with appropriate mood and affect. HENT:   Head:        Normocephalic and atraumatic. Mouth/Throat:         Mucous membranes are normal.   Eyes:         EOM are normal. No scleral icterus. PERRLA. Neck:        Supple, symmetrical  Pulmonary/Chest:      Chest symmetric with normal A/P diameter,  Normal respiratory rate and rhthym. No use of accessory muscles. Abdominal:         Soft. No tenderness. Musculoskeletal:         Normal range of motion. No edema or tenderness of lower extremities. Extremities: No cyanosis, clubbing, or edema present. Neurological:        Alert and oriented.    Psychiatric:        Normal mood and affect. Labs   Urine dip demonstrates   No results found for this visit on 09/11/18. Patients recent PSA values are as follows  Lab Results   Component Value Date    PSA 0.52 11/20/2017    PSA 0.68 07/15/2017    PSA 0.47 02/06/2015        Recent BUN/Creatinine:  Lab Results   Component Value Date    BUN 18 08/22/2018    CREATININE 1.0 08/22/2018       Radiology  No recent imaging has been performed        Assessment & Plan:     Testosterone Deficiency     Jairon Miller f/u today Testosterone Deficiency. Tolerating testosterone cypionate injections, 200 mg every 10 days. Still feels fatigue, lack of energy. Will increase Testosterone to 250 mg or 1.25 ml every 10 days. Check T level in 4-6 weeks. Testosterone, PSA, and H&H in 1 year prior to f/u     Return in about 1 year (around 9/11/2019) for Testosterone deficiency .     Clary Angelucci, APRN  Urology

## 2018-09-25 ENCOUNTER — TELEPHONE (OUTPATIENT)
Dept: FAMILY MEDICINE CLINIC | Age: 53
End: 2018-09-25

## 2018-09-25 DIAGNOSIS — N41.0 ACUTE PROSTATITIS: ICD-10-CM

## 2018-09-25 RX ORDER — CIPROFLOXACIN 500 MG/1
500 TABLET, FILM COATED ORAL 2 TIMES DAILY
Qty: 28 TABLET | Refills: 0 | Status: SHIPPED | OUTPATIENT
Start: 2018-09-25 | End: 2018-10-09

## 2018-09-25 NOTE — TELEPHONE ENCOUNTER
T/C [facility] - [phone] - states he has prostatitis again  Was here 8/13/18 for yearly and 4/27/18 for prostatitis. Asking if you will call in RX for him to DDD? He will check pharm if he does not hear back.

## 2018-11-16 ENCOUNTER — HOSPITAL ENCOUNTER (OUTPATIENT)
Age: 53
Discharge: HOME | End: 2018-11-16
Payer: COMMERCIAL

## 2018-11-16 DIAGNOSIS — E34.9 TESTOSTERONE DEFICIENCY: ICD-10-CM

## 2018-11-16 PROCEDURE — 84403 ASSAY OF TOTAL TESTOSTERONE: CPT

## 2018-11-16 PROCEDURE — 36415 COLL VENOUS BLD VENIPUNCTURE: CPT

## 2018-11-19 ENCOUNTER — TELEPHONE (OUTPATIENT)
Dept: UROLOGY | Age: 53
End: 2018-11-19

## 2018-11-19 DIAGNOSIS — R79.89 LOW TESTOSTERONE: Primary | ICD-10-CM

## 2018-11-19 DIAGNOSIS — E34.9 TESTOSTERONE DEFICIENCY: ICD-10-CM

## 2018-11-19 LAB — TESTOSTERONE TOTAL: > 1500 NG/DL (ref 300–890)

## 2018-11-19 RX ORDER — TESTOSTERONE CYPIONATE 200 MG/ML
INJECTION INTRAMUSCULAR
Qty: 10 ML | Refills: 3
Start: 2018-11-19 | End: 2019-01-18 | Stop reason: DRUGHIGH

## 2018-11-19 NOTE — TELEPHONE ENCOUNTER
Please let Abhi Bermudez know that his T level is too high, its greater than 1500,. We need to decrease the dose back down to 200 mg every 10 days and recheck T level in approx 2 weeks. Lab order needs to be obtained 5 days after his injection.  Thanks    [employer]

## 2018-12-29 ENCOUNTER — HOSPITAL ENCOUNTER (EMERGENCY)
Age: 53
Discharge: HOME | End: 2018-12-29
Attending: NURSE PRACTITIONER
Payer: COMMERCIAL

## 2018-12-29 VITALS
SYSTOLIC BLOOD PRESSURE: 137 MMHG | HEART RATE: 91 BPM | TEMPERATURE: 97.7 F | DIASTOLIC BLOOD PRESSURE: 90 MMHG | RESPIRATION RATE: 18 BRPM | WEIGHT: 237 LBS | HEIGHT: 69 IN | OXYGEN SATURATION: 95 % | BODY MASS INDEX: 35.1 KG/M2

## 2018-12-29 DIAGNOSIS — J01.00 ACUTE MAXILLARY SINUSITIS, RECURRENCE NOT SPECIFIED: Primary | ICD-10-CM

## 2018-12-29 PROCEDURE — 99212 OFFICE O/P EST SF 10 MIN: CPT

## 2018-12-29 PROCEDURE — 99213 OFFICE O/P EST LOW 20 MIN: CPT | Performed by: NURSE PRACTITIONER

## 2018-12-29 RX ORDER — CEFDINIR 300 MG/1
300 CAPSULE ORAL 2 TIMES DAILY
Qty: 14 CAPSULE | Refills: 0 | Status: SHIPPED | OUTPATIENT
Start: 2018-12-29 | End: 2019-01-05

## 2018-12-29 ASSESSMENT — ENCOUNTER SYMPTOMS
COUGH: 1
VOICE CHANGE: 1
SORE THROAT: 1
NAUSEA: 0
SINUS PAIN: 1
VOMITING: 0
SINUS PRESSURE: 1
ABDOMINAL PAIN: 0

## 2018-12-29 ASSESSMENT — PAIN DESCRIPTION - DESCRIPTORS: DESCRIPTORS: ACHING

## 2018-12-29 ASSESSMENT — PAIN DESCRIPTION - LOCATION: LOCATION: FACE

## 2018-12-29 ASSESSMENT — PAIN SCALES - GENERAL: PAINLEVEL_OUTOF10: 8

## 2018-12-29 NOTE — ED PROVIDER NOTES
[address]  Urgent Care Encounter      CHIEF COMPLAINT       Chief Complaint   Patient presents with    Pharyngitis    Sinusitis       Nurses Notes reviewed and I agree except as noted in the HPI. HISTORY OF PRESENT ILLNESS   Randi Araujo is a 46 y.o. male who presents Sinus congestion and sore throat. Patient states he has a lot of sinus pressure but clear nasal mucus. He is complaining of severe sore throat and minimal productive cough. He has been taking over-the-counter Coricidin and NyQuil without relief his symptoms. He appears to not feel well but is in no acute distress. REVIEW OF SYSTEMS     Review of Systems   Constitutional: Positive for fatigue. Negative for appetite change, chills and fever. HENT: Positive for congestion (clear), postnasal drip, sinus pain, sinus pressure, sore throat and voice change. Negative for ear pain. Respiratory: Positive for cough (nonproductive). Gastrointestinal: Negative for abdominal pain, nausea and vomiting. Musculoskeletal: Negative for myalgias. PAST MEDICAL HISTORY         Diagnosis Date    Allergic rhinitis     BPH (benign prostatic hyperplasia)     Hyperlipidemia     Hypertension     Hypogonadism 9/12/2011    Hypogonadism male     Inflammatory spondylopathies ([facility] [address]     Testosterone deficiency        SURGICAL HISTORY     Patient  has a past surgical history that includes Elbow surgery (8/11); sinus surgery; other surgical history (age 15); Cervical spine surgery (06/01/2015); Colonoscopy (2011); Carpal tunnel release (Left, 2015); lumbar fusion (08/2017); and back surgery (08/05/2018).     CURRENT MEDICATIONS       Previous Medications    CETIRIZINE (ZYRTEC) 10 MG TABLET    Take 1 tablet by mouth daily    ESCITALOPRAM (LEXAPRO) 10 MG TABLET    Take 1 tablet by mouth daily    ESOMEPRAZOLE MAGNESIUM (NEXIUM PO)    Take by mouth as needed OTC    IBUPROFEN (ADVIL;MOTRIN) 800 MG TABLET    Take 1 tablet by mouth

## 2018-12-31 ENCOUNTER — NURSE TRIAGE (OUTPATIENT)
Dept: ADMINISTRATIVE | Age: 53
End: 2018-12-31

## 2018-12-31 ENCOUNTER — TELEPHONE (OUTPATIENT)
Dept: FAMILY MEDICINE CLINIC | Age: 53
End: 2018-12-31

## 2018-12-31 NOTE — TELEPHONE ENCOUNTER
T/C Miko Gonzalez [phone] - states he went to Urgent Care 12/29/18 for cold and sore throat issues. Was given RX of Cefdinir 300 mg bid. He states the cold symptoms are better but his throat is still horrible. He is asking if he should change antibiotic or what you want him to do. DDD.

## 2019-01-02 DIAGNOSIS — I10 ESSENTIAL HYPERTENSION: ICD-10-CM

## 2019-01-02 RX ORDER — LOSARTAN POTASSIUM AND HYDROCHLOROTHIAZIDE 12.5; 1 MG/1; MG/1
1 TABLET ORAL DAILY
Qty: 90 TABLET | Refills: 3 | Status: SHIPPED | OUTPATIENT
Start: 2019-01-02 | End: 2019-10-08

## 2019-01-14 ENCOUNTER — HOSPITAL ENCOUNTER (OUTPATIENT)
Age: 54
Discharge: HOME | End: 2019-01-14
Payer: COMMERCIAL

## 2019-01-14 DIAGNOSIS — R79.89 LOW TESTOSTERONE: ICD-10-CM

## 2019-01-14 PROCEDURE — 84403 ASSAY OF TOTAL TESTOSTERONE: CPT

## 2019-01-14 PROCEDURE — 36415 COLL VENOUS BLD VENIPUNCTURE: CPT

## 2019-01-17 LAB — TESTOSTERONE TOTAL: > 1500 NG/DL (ref 300–890)

## 2019-01-18 ENCOUNTER — TELEPHONE (OUTPATIENT)
Dept: UROLOGY | Age: 54
End: 2019-01-18

## 2019-01-18 DIAGNOSIS — E34.9 TESTOSTERONE DEFICIENCY: ICD-10-CM

## 2019-01-18 RX ORDER — TESTOSTERONE CYPIONATE 200 MG/ML
INJECTION INTRAMUSCULAR
Qty: 10 ML | Refills: 3 | Status: SHIPPED
Start: 2019-01-18 | End: 2019-06-24 | Stop reason: SDUPTHER

## 2019-02-15 ENCOUNTER — TELEPHONE (OUTPATIENT)
Dept: FAMILY MEDICINE CLINIC | Age: 54
End: 2019-02-15

## 2019-02-15 DIAGNOSIS — N41.0 ACUTE PROSTATITIS: ICD-10-CM

## 2019-02-15 RX ORDER — CIPROFLOXACIN 500 MG/1
500 TABLET, FILM COATED ORAL 2 TIMES DAILY
Qty: 28 TABLET | Refills: 0 | Status: SHIPPED | OUTPATIENT
Start: 2019-02-15 | End: 2020-02-05 | Stop reason: SDUPTHER

## 2019-02-21 ENCOUNTER — TELEPHONE (OUTPATIENT)
Dept: UROLOGY | Age: 54
End: 2019-02-21

## 2019-06-24 DIAGNOSIS — E34.9 TESTOSTERONE DEFICIENCY: ICD-10-CM

## 2019-06-24 RX ORDER — TESTOSTERONE CYPIONATE 200 MG/ML
INJECTION INTRAMUSCULAR
Qty: 10 ML | Refills: 3 | Status: SHIPPED | OUTPATIENT
Start: 2019-06-24 | End: 2019-09-10 | Stop reason: SDUPTHER

## 2019-06-24 NOTE — TELEPHONE ENCOUNTER
Hafsa Chua called requesting a refill on the following medications:  Requested Prescriptions     Pending Prescriptions Disp Refills    testosterone cypionate (DEPOTESTOTERONE CYPIONATE) 200 MG/ML injection 10 mL 3     Sig: Inject 1 ml in to the muscle every 14 days     Pharmacy verified:  .pv    DDD    Date of last visit: 09/11/18  Date of next visit (if applicable): 2/17/2942

## 2019-07-01 ENCOUNTER — OFFICE VISIT (OUTPATIENT)
Dept: FAMILY MEDICINE CLINIC | Age: 54
End: 2019-07-01
Payer: COMMERCIAL

## 2019-07-01 VITALS
RESPIRATION RATE: 12 BRPM | BODY MASS INDEX: 36.14 KG/M2 | DIASTOLIC BLOOD PRESSURE: 80 MMHG | HEIGHT: 69 IN | WEIGHT: 244 LBS | HEART RATE: 76 BPM | SYSTOLIC BLOOD PRESSURE: 132 MMHG

## 2019-07-01 DIAGNOSIS — Z00.00 ANNUAL PHYSICAL EXAM: Primary | ICD-10-CM

## 2019-07-01 DIAGNOSIS — Z23 NEED FOR TDAP VACCINATION: ICD-10-CM

## 2019-07-01 PROCEDURE — 99396 PREV VISIT EST AGE 40-64: CPT | Performed by: FAMILY MEDICINE

## 2019-07-01 PROCEDURE — 90471 IMMUNIZATION ADMIN: CPT | Performed by: FAMILY MEDICINE

## 2019-07-01 PROCEDURE — 90715 TDAP VACCINE 7 YRS/> IM: CPT | Performed by: FAMILY MEDICINE

## 2019-07-01 ASSESSMENT — ENCOUNTER SYMPTOMS
CHEST TIGHTNESS: 0
ABDOMINAL PAIN: 0
BLOOD IN STOOL: 0
EYES NEGATIVE: 1
SHORTNESS OF BREATH: 0

## 2019-07-01 ASSESSMENT — PATIENT HEALTH QUESTIONNAIRE - PHQ9
SUM OF ALL RESPONSES TO PHQ QUESTIONS 1-9: 0
SUM OF ALL RESPONSES TO PHQ QUESTIONS 1-9: 0
2. FEELING DOWN, DEPRESSED OR HOPELESS: 0
1. LITTLE INTEREST OR PLEASURE IN DOING THINGS: 0
SUM OF ALL RESPONSES TO PHQ9 QUESTIONS 1 & 2: 0

## 2019-07-01 NOTE — PROGRESS NOTES
Chief Complaint   Patient presents with    Annual Exam     will need lab orders, not fasting       SUBJECTIVE     Marie Samaniego is a 46 y.o.male    Pt presents for annual wellness physical exam.        Pt stable since last visit- no newproblems for diagnoses listed below:  Patient Active Problem List   Diagnosis    Low testosterone    HTN (hypertension)    Hyperlipidemia    Allergic rhinitis    Hemorrhoid    Obesity (BMI 30.0-34. 9)    Anxiety     Doing well. Denies complaints. Hasn't been exercising as much and his weight is up a little. He had lost some by doing the Keto diet but has gained it back. BPs stable. Takes all meds as directed and denies side effects. No recent illnesses or hospitalizations. Due for tetanus booster. Anxiety stable. Sees urology for his testosterone supplementation. They monitor his PSAs. Sees GI for regular colonoscopies. Nonsmoker. Body mass index is 36.03 kg/m². Review of Systems   Constitutional: Negative for chills, fatigue, fever and unexpected weight change. HENT: Negative. Eyes: Negative. Respiratory: Negative for chest tightness and shortness of breath. Cardiovascular: Negative for chest pain, palpitations and leg swelling. Gastrointestinal: Negative for abdominal pain and blood in stool. Genitourinary: Negative for dysuria. Musculoskeletal: Negative for joint swelling. Skin: Negative for rash. Neurological: Negative for dizziness. Psychiatric/Behavioral: Negative. All other systems reviewed and are negative. OBJECTIVE     /80   Pulse 76   Resp 12   Ht 5' 9\" (1.753 m)   Wt 244 lb (110.7 kg)   BMI 36.03 kg/m²     Wt Readings from Last 3 Encounters:   07/01/19 244 lb (110.7 kg)   12/29/18 237 lb (107.5 kg)   09/11/18 241 lb (109.3 kg)       Physical Exam   Constitutional: He is oriented to person, place, and time. He appears well-developed and well-nourished. No distress.    HENT:   Head: Normocephalic and atraumatic. Right Ear: Tympanic membrane normal.   Left Ear: Tympanic membrane normal.   Mouth/Throat: Oropharynx is clear and moist and mucous membranes are normal.   Neck: Neck supple. Carotid bruit is not present. No thyromegaly present. Cardiovascular: Normal rate and regular rhythm. No murmur heard. Pulmonary/Chest: Effort normal and breath sounds normal. He has no wheezes. Abdominal: Soft. There is no tenderness. Musculoskeletal: He exhibits no edema. Neurological: He is alert and oriented to person, place, and time. Skin: Skin is warm and dry. No rash noted. Immunization History   Administered Date(s) Administered    Influenza A (H5N1) Monovalent Vaccine, Adjuvanted-2013 12/06/2017    Tdap (Boostrix, Adacel) 07/01/2019         Health Maintenance   Topic Date Due    Shingles Vaccine (1 of 2) 08/16/2014    PSA counseling  11/20/2018    A1C test (Diabetic or Prediabetic)  12/29/2018    Hepatitis C screen  06/23/2022 (Originally 1965)    HIV screen  06/23/2022 (Originally 8/16/1979)    Potassium monitoring  08/22/2019    Creatinine monitoring  08/22/2019    Flu vaccine (1) 09/01/2019    Colon cancer screen colonoscopy  07/30/2020    Lipid screen  08/22/2023    DTaP/Tdap/Td vaccine (2 - Td) 07/01/2029    Pneumococcal 0-64 years Vaccine  Aged Out         ASSESSMENT      1. Annual physical exam    2. Need for Tdap vaccination        PLAN      Continue current meds    Orders Placed This Encounter   Procedures    Tdap (age 10y-63y) IM (Adacel)   [facility] Lipid Panel     Standing Status:   Future     Standing Expiration Date:   6/30/2020     Order Specific Question:   Is Patient Fasting?/# of Hours     Answer:   12    Comprehensive Metabolic Panel     Standing Status:   Future     Standing Expiration Date:   6/30/2020       Adacel today    Labs as above. Will complete wellness form once labs back    Healthy diet and exercise for weight loss and BP reduction.     Amanda Smyth received

## 2019-07-01 NOTE — PROGRESS NOTES
Immunizations     Name Date Dose Route    Tdap (Boostrix, Adacel) 7/1/2019 0.5 mL Intramuscular    Site: Dorsogluteal- Right    Lot: [id number]    NDC: 45984-161-15        After obtaining consent, and per orders of Dr. Emmy Alvarez, injection of Adacel 0.5 ml given IM RVL by David Adair. Patient tolerated well.

## 2019-07-01 NOTE — PATIENT INSTRUCTIONS
during a routine doctor visit. Your doctor will tell you how often to check your blood pressure based on your age, your blood pressure results, and other factors. · Prostate exam. Talk to your doctor about whether you should have a blood test (called a PSA test) for prostate cancer. Experts recommend that you discuss the benefits and risks of the test with your doctor before you decide whether to have this test.  · Diabetes. Ask your doctor whether you should have tests for diabetes. · Vision. Some experts recommend that you have yearly exams for glaucoma and other age-related eye problems starting at age 48. · Hearing. Tell your doctor if you notice any change in your hearing. You can have tests to find out how well you hear. · Colorectal cancer. Your risk for colorectal cancer gets higher as you get older. Some experts say that adults should start regular screening at age 48 and stop at age 76. Others say to start before age 48 or continue after age 76. Talk with your doctor about your risk and when to start and stop screening. · Heart attack and stroke risk. At least every 4 to 6 years, you should have your risk for heart attack and stroke assessed. Your doctor uses factors such as your age, blood pressure, cholesterol, and whether you smoke or have diabetes to show what your risk for a heart attack or stroke is over the next 10 years. · Abdominal aortic aneurysm. Ask your doctor whether you should have a test to check for an aneurysm. You may need a test if you ever smoked or if your parent, brother, sister, or child has had an aneurysm. When should you call for help? Watch closely for changes in your health, and be sure to contact your doctor if you have any problems or symptoms that concern you. Where can you learn more? Go to https://analilia.[employer]. org and sign in to your vitalclipt account.  Enter R387 in the [facility] box to learn more about \"Well Visit, Men 48 to 72: Care Instructions. \"     If you do not have an account, please click on the \"Sign Up Now\" link. Current as of: December 13, 2018  Content Version: 12.0  © 0408-9613 Healthwise, Incorporated. Care instructions adapted under license by [facility] ([facility]). If you have questions about a medical condition or this instruction, always ask your healthcare professional. [address] any warranty or liability for your use of this information.

## 2019-07-09 ENCOUNTER — OFFICE VISIT (OUTPATIENT)
Dept: FAMILY MEDICINE CLINIC | Age: 54
End: 2019-07-09
Payer: COMMERCIAL

## 2019-07-09 ENCOUNTER — HOSPITAL ENCOUNTER (OUTPATIENT)
Age: 54
Discharge: HOME | End: 2019-07-09
Payer: COMMERCIAL

## 2019-07-09 VITALS
BODY MASS INDEX: 35.44 KG/M2 | TEMPERATURE: 97.8 F | DIASTOLIC BLOOD PRESSURE: 76 MMHG | RESPIRATION RATE: 24 BRPM | SYSTOLIC BLOOD PRESSURE: 120 MMHG | WEIGHT: 240 LBS | HEART RATE: 84 BPM

## 2019-07-09 DIAGNOSIS — E34.9 TESTOSTERONE DEFICIENCY: ICD-10-CM

## 2019-07-09 DIAGNOSIS — J00 COMMON COLD: Primary | ICD-10-CM

## 2019-07-09 DIAGNOSIS — R05.9 COUGH: ICD-10-CM

## 2019-07-09 LAB
HCT VFR BLD CALC: 48.2 % (ref 42–52)
HEMOGLOBIN: 14.3 GM/DL (ref 14–18)

## 2019-07-09 PROCEDURE — 85014 HEMATOCRIT: CPT

## 2019-07-09 PROCEDURE — 84403 ASSAY OF TOTAL TESTOSTERONE: CPT

## 2019-07-09 PROCEDURE — 99213 OFFICE O/P EST LOW 20 MIN: CPT | Performed by: FAMILY MEDICINE

## 2019-07-09 PROCEDURE — 85018 HEMOGLOBIN: CPT

## 2019-07-09 PROCEDURE — 84153 ASSAY OF PSA TOTAL: CPT

## 2019-07-09 PROCEDURE — 36415 COLL VENOUS BLD VENIPUNCTURE: CPT

## 2019-07-09 RX ORDER — BENZONATATE 100 MG/1
100 CAPSULE ORAL 3 TIMES DAILY PRN
Qty: 30 CAPSULE | Refills: 0 | Status: SHIPPED | OUTPATIENT
Start: 2019-07-09 | End: 2019-07-19

## 2019-07-09 ASSESSMENT — ENCOUNTER SYMPTOMS
SORE THROAT: 1
COUGH: 1
SHORTNESS OF BREATH: 0
GASTROINTESTINAL NEGATIVE: 1
SINUS PAIN: 1
SINUS PRESSURE: 1
WHEEZING: 0

## 2019-07-10 DIAGNOSIS — E78.00 PURE HYPERCHOLESTEROLEMIA: ICD-10-CM

## 2019-07-10 LAB — PROSTATE SPECIFIC ANTIGEN: 0.73 NG/ML (ref 0–1)

## 2019-07-10 RX ORDER — SIMVASTATIN 40 MG
40 TABLET ORAL NIGHTLY
Qty: 90 TABLET | Refills: 3 | OUTPATIENT
Start: 2019-07-10

## 2019-07-11 ENCOUNTER — TELEPHONE (OUTPATIENT)
Dept: FAMILY MEDICINE CLINIC | Age: 54
End: 2019-07-11

## 2019-07-12 LAB — TESTOSTERONE TOTAL: 987 NG/DL (ref 300–890)

## 2019-07-18 ENCOUNTER — TELEPHONE (OUTPATIENT)
Dept: FAMILY MEDICINE CLINIC | Age: 54
End: 2019-07-18

## 2019-07-18 RX ORDER — AZITHROMYCIN 250 MG/1
TABLET, FILM COATED ORAL
Qty: 1 PACKET | Refills: 0 | Status: SHIPPED | OUTPATIENT
Start: 2019-07-18 | End: 2019-07-23

## 2019-08-03 ENCOUNTER — HOSPITAL ENCOUNTER (OUTPATIENT)
Age: 54
Discharge: HOME | End: 2019-08-03
Payer: COMMERCIAL

## 2019-08-03 DIAGNOSIS — Z00.00 ANNUAL PHYSICAL EXAM: ICD-10-CM

## 2019-08-03 LAB
ALBUMIN SERPL-MCNC: 4 G/DL (ref 3.5–5.1)
ALP BLD-CCNC: 74 U/L (ref 38–126)
ALT SERPL-CCNC: 33 U/L (ref 11–66)
ANION GAP SERPL CALCULATED.3IONS-SCNC: 12 MEQ/L (ref 8–16)
AST SERPL-CCNC: 29 U/L (ref 5–40)
BILIRUB SERPL-MCNC: 0.4 MG/DL (ref 0.3–1.2)
BUN BLDV-MCNC: 16 MG/DL (ref 7–22)
CALCIUM SERPL-MCNC: 9 MG/DL (ref 8.5–10.5)
CHLORIDE BLD-SCNC: 101 MEQ/L (ref 98–111)
CHOLESTEROL, TOTAL: 170 MG/DL (ref 100–199)
CO2: 26 MEQ/L (ref 23–33)
CREAT SERPL-MCNC: 0.9 MG/DL (ref 0.4–1.2)
GFR SERPL CREATININE-BSD FRML MDRD: 88 ML/MIN/1.73M2
GLUCOSE BLD-MCNC: 111 MG/DL (ref 70–108)
HDLC SERPL-MCNC: 46 MG/DL
LDL CHOLESTEROL CALCULATED: 75 MG/DL
POTASSIUM SERPL-SCNC: 4.6 MEQ/L (ref 3.5–5.2)
SODIUM BLD-SCNC: 139 MEQ/L (ref 135–145)
TOTAL PROTEIN: 6.9 G/DL (ref 6.1–8)
TRIGL SERPL-MCNC: 243 MG/DL (ref 0–199)

## 2019-08-03 PROCEDURE — 36415 COLL VENOUS BLD VENIPUNCTURE: CPT

## 2019-08-03 PROCEDURE — 80053 COMPREHEN METABOLIC PANEL: CPT

## 2019-08-03 PROCEDURE — 80061 LIPID PANEL: CPT

## 2019-08-06 ENCOUNTER — TELEPHONE (OUTPATIENT)
Dept: FAMILY MEDICINE CLINIC | Age: 54
End: 2019-08-06

## 2019-08-20 DIAGNOSIS — F41.9 ANXIETY: ICD-10-CM

## 2019-08-20 DIAGNOSIS — E78.00 PURE HYPERCHOLESTEROLEMIA: ICD-10-CM

## 2019-08-20 RX ORDER — ESCITALOPRAM OXALATE 10 MG/1
10 TABLET ORAL DAILY
Qty: 90 TABLET | Refills: 3 | Status: SHIPPED | OUTPATIENT
Start: 2019-08-20 | End: 2020-08-17 | Stop reason: SDUPTHER

## 2019-08-20 RX ORDER — SIMVASTATIN 40 MG
40 TABLET ORAL NIGHTLY
Qty: 90 TABLET | Refills: 3 | Status: SHIPPED | OUTPATIENT
Start: 2019-08-20 | End: 2020-08-17 | Stop reason: SDUPTHER

## 2019-08-20 RX ORDER — TAMSULOSIN HYDROCHLORIDE 0.4 MG/1
0.4 CAPSULE ORAL DAILY
Qty: 90 CAPSULE | Refills: 3 | Status: SHIPPED | OUTPATIENT
Start: 2019-08-20 | End: 2020-08-17 | Stop reason: SDUPTHER

## 2019-09-10 ENCOUNTER — OFFICE VISIT (OUTPATIENT)
Dept: UROLOGY | Age: 54
End: 2019-09-10
Payer: COMMERCIAL

## 2019-09-10 VITALS
HEIGHT: 69 IN | DIASTOLIC BLOOD PRESSURE: 84 MMHG | BODY MASS INDEX: 36.5 KG/M2 | SYSTOLIC BLOOD PRESSURE: 132 MMHG | WEIGHT: 246.4 LBS

## 2019-09-10 DIAGNOSIS — E34.9 TESTOSTERONE DEFICIENCY: Primary | ICD-10-CM

## 2019-09-10 LAB
BILIRUBIN URINE: NEGATIVE
BLOOD URINE, POC: NORMAL
CHARACTER, URINE: CLEAR
COLOR, URINE: YELLOW
GLUCOSE URINE: NEGATIVE MG/DL
KETONES, URINE: NEGATIVE
LEUKOCYTE CLUMPS, URINE: NEGATIVE
NITRITE, URINE: NEGATIVE
PH, URINE: 5.5 (ref 5–9)
PROTEIN, URINE: NEGATIVE MG/DL
SPECIFIC GRAVITY, URINE: 1.02 (ref 1–1.03)
UROBILINOGEN, URINE: 0.2 EU/DL (ref 0–1)

## 2019-09-10 PROCEDURE — 81003 URINALYSIS AUTO W/O SCOPE: CPT | Performed by: NURSE PRACTITIONER

## 2019-09-10 PROCEDURE — 99213 OFFICE O/P EST LOW 20 MIN: CPT | Performed by: NURSE PRACTITIONER

## 2019-09-10 RX ORDER — SYRINGE W-NEEDLE,DISPOSAB,3 ML 25GX5/8"
SYRINGE, EMPTY DISPOSABLE MISCELLANEOUS
Qty: 3 EACH | Refills: 5 | Status: SHIPPED | OUTPATIENT
Start: 2019-09-10

## 2019-09-10 RX ORDER — TESTOSTERONE CYPIONATE 200 MG/ML
INJECTION INTRAMUSCULAR
Qty: 10 ML | Refills: 3 | Status: SHIPPED | OUTPATIENT
Start: 2019-09-10 | End: 2020-11-10 | Stop reason: SDUPTHER

## 2019-09-10 NOTE — PROGRESS NOTES
affect. Labs   Urine dip demonstrates   Results for POC orders placed in visit on 09/10/19   POCT Urinalysis No Micro (Auto)   Result Value Ref Range    Glucose, Ur Negative NEGATIVE mg/dl    Bilirubin Urine Negative     Ketones, Urine Negative NEGATIVE    Specific Gravity, Urine 1.025 1.002 - 1.03    Blood, UA POC Trace-intact NEGATIVE    pH, Urine 5.50 5.0 - 9.0    Protein, Urine Negative NEGATIVE mg/dl    Urobilinogen, Urine 0.20 0.0 - 1.0 eu/dl    Nitrite, Urine Negative NEGATIVE    Leukocyte Clumps, Urine Negative NEGATIVE    Color, Urine Yellow YELLOW-STR    Character, Urine Clear [id number]       Patients recent PSA values are as follows  Lab Results   Component Value Date    PSA 0.73 07/09/2019    PSA 0.52 11/20/2017    PSA 0.68 07/15/2017        Recent BUN/Creatinine:  Lab Results   Component Value Date    BUN 16 08/03/2019    CREATININE 0.9 08/03/2019       Radiology  No recent imaging has been performed        Assessment & Plan:     Testosterone Deficiency     Laverne Sorensen f/u today Testosterone Deficiency. Tolerating testosterone cypionate injections, 200 mg every 14 days, level was obtained mid cycle, 987. Plan to decrease down to 150 mg or 0.75 ml every 14 days. He will obtain T level here in the next couple weeks. Testosterone, PSA, and H&H in 1 year prior to f/u     Return in about 1 year (around 9/10/2020) for Testosterone Deficiency .     BETTY Burrows  Urology

## 2019-10-08 ENCOUNTER — TELEPHONE (OUTPATIENT)
Dept: FAMILY MEDICINE CLINIC | Age: 54
End: 2019-10-08

## 2019-10-08 RX ORDER — HYDROCHLOROTHIAZIDE 12.5 MG/1
12.5 CAPSULE, GELATIN COATED ORAL EVERY MORNING
Qty: 90 CAPSULE | Refills: 3 | Status: SHIPPED | OUTPATIENT
Start: 2019-10-08 | End: 2020-03-05

## 2019-10-08 RX ORDER — LOSARTAN POTASSIUM 100 MG/1
100 TABLET ORAL DAILY
Qty: 90 TABLET | Refills: 3 | Status: SHIPPED | OUTPATIENT
Start: 2019-10-08 | End: 2020-03-05

## 2020-02-05 ENCOUNTER — TELEPHONE (OUTPATIENT)
Dept: FAMILY MEDICINE CLINIC | Age: 55
End: 2020-02-05

## 2020-02-05 RX ORDER — CIPROFLOXACIN 500 MG/1
500 TABLET, FILM COATED ORAL 2 TIMES DAILY
Qty: 28 TABLET | Refills: 0 | Status: SHIPPED | OUTPATIENT
Start: 2020-02-05 | End: 2020-05-15 | Stop reason: SDUPTHER

## 2020-02-05 NOTE — TELEPHONE ENCOUNTER
Patient called to office complaining of prostatitis again. He is asking if you could send in a Rx for Cipro again. He uses [facility].   Please call him back at [phone]

## 2020-03-05 ENCOUNTER — TELEPHONE (OUTPATIENT)
Dept: FAMILY MEDICINE CLINIC | Age: 55
End: 2020-03-05

## 2020-03-05 RX ORDER — LOSARTAN POTASSIUM AND HYDROCHLOROTHIAZIDE 12.5; 1 MG/1; MG/1
1 TABLET ORAL DAILY
Qty: 90 TABLET | Refills: 3 | Status: SHIPPED | OUTPATIENT
Start: 2020-03-05 | End: 2020-08-03 | Stop reason: SDUPTHER

## 2020-03-05 NOTE — TELEPHONE ENCOUNTER
Rx EP'd to pharmacy. Please notify patient. Requested Prescriptions     Signed Prescriptions Disp Refills    losartan-hydrochlorothiazide (HYZAAR) 100-12.5 MG per tablet 90 tablet 3     Sig: Take 1 tablet by mouth daily     Authorizing Provider: Nato Godinez     Rx sent for combo pill to use in place of the 2 individual medicines. Have him wear compression hose if he's going to be working long hours on his feet. Call if not better.         Electronically signed by Pipo Dennison MD on 3/5/2020 at 12:07 PM

## 2020-03-19 ENCOUNTER — TELEPHONE (OUTPATIENT)
Dept: FAMILY MEDICINE CLINIC | Age: 55
End: 2020-03-19

## 2020-03-19 RX ORDER — BENZONATATE 100 MG/1
100 CAPSULE ORAL 3 TIMES DAILY PRN
Qty: 21 CAPSULE | Refills: 0 | Status: SHIPPED | OUTPATIENT
Start: 2020-03-19 | End: 2020-03-26

## 2020-03-19 RX ORDER — AZITHROMYCIN 250 MG/1
TABLET, FILM COATED ORAL
Qty: 1 PACKET | Refills: 0 | Status: SHIPPED | OUTPATIENT
Start: 2020-03-19 | End: 2020-03-24

## 2020-03-19 NOTE — TELEPHONE ENCOUNTER
Rx EP'd to pharmacy. Please notify patient. Requested Prescriptions     Signed Prescriptions Disp Refills    azithromycin (ZITHROMAX Z-FIDEL) 250 MG tablet 1 packet 0     Sig: Take 500mg po on Day 1, then 250mg po daily on Days 2-5.      Authorizing Provider: Loyda Alaniz    benzonatate (TESSALON) 100 MG capsule 21 capsule 0     Sig: Take 1 capsule by mouth 3 times daily as needed for Cough     Authorizing Provider: Loyda Alaniz           Electronically signed by Maximo Sykes MD on 3/19/2020 at 12:02 PM

## 2020-03-19 NOTE — TELEPHONE ENCOUNTER
Patients wife Percy Clarke (on hippa) calling in for patient, asking for possible appointment today after 3:00, or advice, or appt next week. She said for about 2 weeks now the patient has had a dry cough, mostly at night that is keeping him from sleeping. He started with a runny nose but took zyrtec and that stopped. He has been taking robitussin DM for the cough with no relief yet. Percy Clarke said he is not having any other symptoms. They do not use mychart, the pharmacy is [facility] in [facility], please call Percy lCarke to advise.

## 2020-05-15 ENCOUNTER — VIRTUAL VISIT (OUTPATIENT)
Dept: FAMILY MEDICINE CLINIC | Age: 55
End: 2020-05-15
Payer: COMMERCIAL

## 2020-05-15 PROCEDURE — 99213 OFFICE O/P EST LOW 20 MIN: CPT | Performed by: FAMILY MEDICINE

## 2020-05-15 RX ORDER — CIPROFLOXACIN 500 MG/1
500 TABLET, FILM COATED ORAL 2 TIMES DAILY
Qty: 28 TABLET | Refills: 0 | Status: SHIPPED | OUTPATIENT
Start: 2020-05-15 | End: 2020-05-29

## 2020-05-15 ASSESSMENT — ENCOUNTER SYMPTOMS: RESPIRATORY NEGATIVE: 1

## 2020-05-15 NOTE — PROGRESS NOTES
already has a Rx at the pharmacy. Follow up if not better. Ritika Ruiz is a 53 y.o. male being evaluated by a Virtual Visit (video visit) encounter to address concerns as mentioned above. A caregiver was present when appropriate. Due to this being a TeleHealth encounter (During ZXLGW-82 public health emergency), evaluation of the following organ systems was limited: Vitals/Constitutional/EENT/Resp/CV/GI//MS/Neuro/Skin/Heme-Lymph-Imm. Pursuant to the emergency declaration under the [address] [place] [address], [address] [place] [address] authority and the [employer] Act, this Virtual Visit was conducted with patient's (and/or legal guardian's) consent, to reduce the patient's risk of exposure to COVID-19 and provide necessary medical care. The patient (and/or legal guardian) has also been advised to contact this office for worsening conditions or problems, and seek emergency medical treatment and/or call 911 if deemed necessary. Patient identification was verified at the start of the visit: Yes    Total time spent on this encounter: Not billed by time    Services were provided through a video synchronous discussion virtually to substitute for in-person clinic visit. Patient and provider were located at their individual homes. --Shane Cranker, MD on 5/15/2020 at 3:52 PM    An electronic signature was used to authenticate this note.

## 2020-07-14 ENCOUNTER — TELEPHONE (OUTPATIENT)
Dept: FAMILY MEDICINE CLINIC | Age: 55
End: 2020-07-14

## 2020-07-14 NOTE — TELEPHONE ENCOUNTER
Patient's wife Anita Tavares called. She is requesting a referral to [facility] for hearing loss.   Please call her back at [phone]

## 2020-07-14 NOTE — TELEPHONE ENCOUNTER
Wife called back, ok to use [address] [facility], notified that office will call pt to schedule  Order signed

## 2020-07-29 ENCOUNTER — HOSPITAL ENCOUNTER (OUTPATIENT)
Age: 55
Discharge: HOME | End: 2020-07-29
Payer: COMMERCIAL

## 2020-07-29 ENCOUNTER — OFFICE VISIT (OUTPATIENT)
Dept: FAMILY MEDICINE CLINIC | Age: 55
End: 2020-07-29
Payer: COMMERCIAL

## 2020-07-29 VITALS
TEMPERATURE: 98.4 F | RESPIRATION RATE: 20 BRPM | HEART RATE: 88 BPM | SYSTOLIC BLOOD PRESSURE: 136 MMHG | BODY MASS INDEX: 37.51 KG/M2 | WEIGHT: 254 LBS | DIASTOLIC BLOOD PRESSURE: 66 MMHG

## 2020-07-29 DIAGNOSIS — R22.43 LOCALIZED SWELLING OF BOTH LOWER LEGS: ICD-10-CM

## 2020-07-29 PROBLEM — G47.33 OSA (OBSTRUCTIVE SLEEP APNEA): Status: ACTIVE | Noted: 2020-01-22

## 2020-07-29 LAB
ALBUMIN SERPL-MCNC: 3.8 G/DL (ref 3.5–5.1)
ALP BLD-CCNC: 81 U/L (ref 38–126)
ALT SERPL-CCNC: 31 U/L (ref 11–66)
ANION GAP SERPL CALCULATED.3IONS-SCNC: 11 MEQ/L (ref 8–16)
AST SERPL-CCNC: 24 U/L (ref 5–40)
BASOPHILS # BLD: 0.4 %
BASOPHILS ABSOLUTE: 0 THOU/MM3 (ref 0–0.1)
BILIRUB SERPL-MCNC: 0.4 MG/DL (ref 0.3–1.2)
BUN BLDV-MCNC: 16 MG/DL (ref 7–22)
CALCIUM SERPL-MCNC: 9 MG/DL (ref 8.5–10.5)
CHLORIDE BLD-SCNC: 106 MEQ/L (ref 98–111)
CO2: 27 MEQ/L (ref 23–33)
CREAT SERPL-MCNC: 1 MG/DL (ref 0.4–1.2)
EOSINOPHIL # BLD: 3.8 %
EOSINOPHILS ABSOLUTE: 0.3 THOU/MM3 (ref 0–0.4)
ERYTHROCYTE [DISTWIDTH] IN BLOOD BY AUTOMATED COUNT: 21.4 % (ref 11.5–14.5)
ERYTHROCYTE [DISTWIDTH] IN BLOOD BY AUTOMATED COUNT: 62 FL (ref 35–45)
GFR SERPL CREATININE-BSD FRML MDRD: 77 ML/MIN/1.73M2
GLUCOSE BLD-MCNC: 117 MG/DL (ref 70–108)
HCT VFR BLD CALC: 49.7 % (ref 42–52)
HEMOGLOBIN: 15.6 GM/DL (ref 14–18)
IMMATURE GRANS (ABS): 0.07 THOU/MM3 (ref 0–0.07)
IMMATURE GRANULOCYTES: 1 %
LYMPHOCYTES # BLD: 19.1 %
LYMPHOCYTES ABSOLUTE: 1.3 THOU/MM3 (ref 1–4.8)
MCH RBC QN AUTO: 26.2 PG (ref 26–33)
MCHC RBC AUTO-ENTMCNC: 31.4 GM/DL (ref 32.2–35.5)
MCV RBC AUTO: 83.5 FL (ref 80–94)
MONOCYTES # BLD: 7.2 %
MONOCYTES ABSOLUTE: 0.5 THOU/MM3 (ref 0.4–1.3)
NUCLEATED RED BLOOD CELLS: 0 /100 WBC
PLATELET # BLD: 236 THOU/MM3 (ref 130–400)
PMV BLD AUTO: 10.5 FL (ref 9.4–12.4)
POTASSIUM SERPL-SCNC: 4.3 MEQ/L (ref 3.5–5.2)
PRO-BNP: 9.4 PG/ML (ref 0–900)
RBC # BLD: 5.95 MILL/MM3 (ref 4.7–6.1)
SEG NEUTROPHILS: 68.5 %
SEGMENTED NEUTROPHILS ABSOLUTE COUNT: 4.7 THOU/MM3 (ref 1.8–7.7)
SODIUM BLD-SCNC: 144 MEQ/L (ref 135–145)
T4 FREE: 0.74 NG/DL (ref 0.93–1.76)
TOTAL PROTEIN: 6.4 G/DL (ref 6.1–8)
TSH SERPL DL<=0.05 MIU/L-ACNC: 1.71 UIU/ML (ref 0.4–4.2)
WBC # BLD: 6.9 THOU/MM3 (ref 4.8–10.8)

## 2020-07-29 PROCEDURE — 84443 ASSAY THYROID STIM HORMONE: CPT

## 2020-07-29 PROCEDURE — 99214 OFFICE O/P EST MOD 30 MIN: CPT | Performed by: NURSE PRACTITIONER

## 2020-07-29 PROCEDURE — 84439 ASSAY OF FREE THYROXINE: CPT

## 2020-07-29 PROCEDURE — 85025 COMPLETE CBC W/AUTO DIFF WBC: CPT

## 2020-07-29 PROCEDURE — 83880 ASSAY OF NATRIURETIC PEPTIDE: CPT

## 2020-07-29 PROCEDURE — 80053 COMPREHEN METABOLIC PANEL: CPT

## 2020-07-29 PROCEDURE — 36415 COLL VENOUS BLD VENIPUNCTURE: CPT

## 2020-07-29 RX ORDER — FUROSEMIDE 40 MG/1
40 TABLET ORAL DAILY
COMMUNITY
Start: 2020-07-28

## 2020-07-29 ASSESSMENT — ENCOUNTER SYMPTOMS
VOMITING: 0
DIARRHEA: 0
SINUS PAIN: 0
WHEEZING: 0
SHORTNESS OF BREATH: 0
SORE THROAT: 0
EYE PAIN: 0
ABDOMINAL PAIN: 0
COLOR CHANGE: 0
BACK PAIN: 0
TROUBLE SWALLOWING: 0
FACIAL SWELLING: 0
NAUSEA: 0
COUGH: 0

## 2020-07-29 ASSESSMENT — PATIENT HEALTH QUESTIONNAIRE - PHQ9
1. LITTLE INTEREST OR PLEASURE IN DOING THINGS: 0
2. FEELING DOWN, DEPRESSED OR HOPELESS: 0
SUM OF ALL RESPONSES TO PHQ9 QUESTIONS 1 & 2: 0
SUM OF ALL RESPONSES TO PHQ QUESTIONS 1-9: 0
SUM OF ALL RESPONSES TO PHQ QUESTIONS 1-9: 0

## 2020-07-29 NOTE — PROGRESS NOTES
[address] [facility] [address]  Dept: [phone]  Dept Fax: [phone]: [phone]     Visit Date:  7/29/2020      Patient:  Rio Bradford  YOB: 1965    HPI:     Chief Complaint   Patient presents with    Leg Pain     C/O b/l leg pain and swelling in the right leg x 1 month, getting worse. Seen at [facility] last night, diagnosed with edema. Pt presents to the office today for follow up from ER visit at [facility] for leg swelling and pain. He reports that he has noticed his legs swelling for the past few months. He does work at [employer] and stands in one place for long periods of time on concrete. His right leg swells more than his left. The ER yesterday they did a doppler study on his right leg and that was negative for DVT. In addition to the leg swelling he has leg pan to the back of his thigh and into his knee. He does have a HX of back surgery and low back pain. He has tried massages and chiropractic care for his back and leg pain with some relief. He is concerned if the 2 problems of swelling and pain are related. Pt does admit that he does not drink a lot of water and does drink soda. He also does not exercise much other than walking at work. No blood work was completed at the ER, but they did give him lasix. Pt has not picked up that prescription yet. Pt had back surgery a few year ago and knows he has degenerative changes in his back also. Follows up with chiropractic care with relief for the back. Leg Pain    There was no injury mechanism. The pain is present in the right leg. The quality of the pain is described as aching. The pain is moderate. The pain has been fluctuating since onset. Pertinent negatives include no inability to bear weight, loss of motion, loss of sensation, muscle weakness, numbness or tingling. Exacerbated by: standing for long periods.  He has tried rest and NSAIDs (massage and chiropratic carea) for the symptoms. The treatment provided moderate relief. Result Impression   IMPRESSION:     1.  No sonographic evidence for right lower extremity deep venous thrombosis. Electronically signed by: Aleksander Gallardo MD  7/28/2020 6:57 PM CDT Workstation: [id number]   Result Narrative   EXAM DESCRIPTION:   US DUPLEX EXTREMITY DVT RIGHT  RadLex: US EXTREMITY VEINS UNILATERAL     CLINICAL HISTORY:   54 years  Male;  SIGNS & SYMPTOMS: right leg edema and pain;    TECHNIQUE:   Multiple grayscale sonographic images of the leg were obtained utilizing a high-frequency linear array transducer supplemented with color Doppler, compression and augmentation  techniques. COMPARISON: None. FINDINGS:  Right leg veins:    Common femoral: normal    Greater saphenous: normal      upper Superficial femoral: normal        mid Superficial femoral: normal    lower Superficial femoral: normal        Popliteal: normal  Posterior tibial: normal  Enlarged 2.9 x 1.6 x 0.8 cm) lymph node is noted, with normal morphology.    Other Result Information         Medications    Current Outpatient Medications:     losartan-hydrochlorothiazide (HYZAAR) 100-12.5 MG per tablet, Take 1 tablet by mouth daily, Disp: 90 tablet, Rfl: 3    testosterone cypionate (DEPOTESTOTERONE CYPIONATE) 200 MG/ML injection, Inject 0.75 ml in to the muscle every 14 days, Disp: 10 mL, Rfl: 3    Syringe/Needle, Disp, (SYRINGE 3CC/22GX1\") 22G X 1\" 3 ML MISC, Used to inject Testosterone every 14 days, Disp: 3 each, Rfl: 5    simvastatin (ZOCOR) 40 MG tablet, TAKE 1 TABLET BY MOUTH NIGHTLY, Disp: 90 tablet, Rfl: 3    tamsulosin (FLOMAX) 0.4 MG capsule, Take 1 capsule by mouth daily, Disp: 90 capsule, Rfl: 3    escitalopram (LEXAPRO) 10 MG tablet, Take 1 tablet by mouth daily, Disp: 90 tablet, Rfl: 3    cetirizine (ZYRTEC) 10 MG tablet, Take 1 tablet by mouth daily, Disp: 30 tablet, Rfl: 0    Esomeprazole Magnesium (NEXIUM PO), Take by mouth as needed OTC, Disp: , Rfl:     ibuprofen (ADVIL;MOTRIN) 800 MG tablet, Take 1 tablet by mouth every 8 hours as needed for Pain, Disp: 60 tablet, Rfl: 0    furosemide (LASIX) 40 MG tablet, Take 40 mg by mouth daily, Disp: , Rfl:     The patient is allergic to ace inhibitors; amoxicillin-pot clavulanate; avelox [moxifloxacin hcl in nacl]; and bactrim. Past Medical History  Justino Amos  has a past medical history of Allergic rhinitis, BPH (benign prostatic hyperplasia), Hyperlipidemia, Hypertension, Hypogonadism, Hypogonadism male, Inflammatory spondylopathies ([address] and Testosterone deficiency. Subjective:      Review of Systems   Constitutional: Negative for chills, fatigue and fever. HENT: Negative for congestion, facial swelling, sinus pain, sore throat and trouble swallowing. Eyes: Negative for pain and visual disturbance. Respiratory: Negative for cough, shortness of breath and wheezing. Cardiovascular: Positive for leg swelling. Negative for chest pain and palpitations. Gastrointestinal: Negative for abdominal pain, diarrhea, nausea and vomiting. Genitourinary: Negative for difficulty urinating, dysuria and urgency. Musculoskeletal: Negative for back pain, gait problem and neck pain. Skin: Negative for color change and rash. Neurological: Negative for dizziness, tingling, weakness, numbness and headaches. Psychiatric/Behavioral: Negative for agitation and sleep disturbance. The patient is not nervous/anxious. Objective:     /66 (Site: Right Upper Arm)   Pulse 88   Temp 98.4 °F (36.9 °C) (Temporal)   Resp 20   Wt 254 lb (115.2 kg)   BMI 37.51 kg/m²     Physical Exam  Vitals signs reviewed. Constitutional:       General: He is not in acute distress. Appearance: Normal appearance. He is well-developed. HENT:      Head: Normocephalic and atraumatic.       Right Ear: Hearing, tympanic membrane, ear canal and external ear normal. normal.         Judgment: Judgment normal.         Assessment/Plan:      Pedro Arias was seen today for leg pain. Diagnoses and all orders for this visit:    Localized swelling of both lower legs  -     Comprehensive Metabolic Panel; Future  -     CBC Auto Differential; Future  -     TSH without Reflex; Future  -     T4, Free; Future  -     Brain Natriuretic Peptide; Future    Radicular pain of right lower extremity      - Legs above level of the heart 30 min, 3 times daily  - Wear compression stockings while up and around  - Take frequent breaks at work if able and walk around  [employer] today, hold on taking the lasix at this time. - Eliminate salt from diet  - Increase clear fluids  - Call office with any questions or concerns, or if symptoms are getting worse or changing      Return if symptoms worsen or fail to improve. Patient given educational materials - see patient instructions. Discussed use, benefit, and side effects of prescribed medications. All patient questions answered. Pt voiced understanding.         Electronically signed by BETTY Mattson CNP on 7/29/2020 at 12:51 PM

## 2020-07-29 NOTE — PATIENT INSTRUCTIONS
Legs above level of the heart 30 min , 3 times daily  Wear compression stockings  Labs today  Eliminate salt from diet  Increase clear fluids      Patient Education        Leg and Ankle Edema: Care Instructions  Your Care Instructions  Swelling in the legs, ankles, and feet is called edema. It is common after you sit or stand for a while. Long plane flights or car rides often cause swelling in the legs and feet. You may also have swelling if you have to stand for long periods of time at your job. Problems with the veins in the legs (varicose veins) and changes in hormones can also cause swelling. Sometimes the swelling in the ankles and feet is caused by a more serious problem, such as heart failure, infection, blood clots, or liver or kidney disease. Follow-up care is a key part of your treatment and safety. Be sure to make and go to all appointments, and call your doctor if you are having problems. It's also a good idea to know your test results and keep a list of the medicines you take. How can you care for yourself at home? · If your doctor gave you medicine, take it as prescribed. Call your doctor if you think you are having a problem with your medicine. · Whenever you are resting, raise your legs up. Try to keep the swollen area higher than the level of your heart. · Take breaks from standing or sitting in one position. ? Walk around to increase the blood flow in your lower legs. ? Move your feet and ankles often while you stand, or tighten and relax your leg muscles. · Wear support stockings. Put them on in the morning, before swelling gets worse. · Eat a balanced diet. Lose weight if you need to. · Limit the amount of salt (sodium) in your diet. Salt holds fluid in the body and may increase swelling. When should you call for help? [id number] anytime you think you may need emergency care. For example, call if:  · You have symptoms of a blood clot in your lung (called a pulmonary embolism).  These may include:  ? Sudden chest pain. ? Trouble breathing. ? Coughing up blood. Call your doctor now or seek immediate medical care if:  · You have signs of a blood clot, such as:  ? Pain in your calf, back of the knee, thigh, or groin. ? Redness and swelling in your leg or groin. · You have symptoms of infection, such as:  ? Increased pain, swelling, warmth, or redness. ? Red streaks or pus. ? A fever. Watch closely for changes in your health, and be sure to contact your doctor if:  · Your swelling is getting worse. · You have new or worsening pain in your legs. · You do not get better as expected. Where can you learn more? Go to https://[employer]. org and sign in to your DrDoctor account. Enter Z698 in the [employer] box to learn more about \"Leg and Ankle Edema: Care Instructions. \"     If you do not have an account, please click on the \"Sign Up Now\" link. Current as of: June 26, 2019               Content Version: 12.5  © 2783-6905 Healthwise, Incorporated. Care instructions adapted under license by [facility] ([facility]). If you have questions about a medical condition or this instruction, always ask your healthcare professional. [address] any warranty or liability for your use of this information.

## 2020-07-30 ENCOUNTER — TELEPHONE (OUTPATIENT)
Dept: FAMILY MEDICINE CLINIC | Age: 55
End: 2020-07-30

## 2020-07-30 NOTE — TELEPHONE ENCOUNTER
Left message to call back. Order pending          ----- Message from Abby Angelucci, APRN - CNP sent at 7/29/2020  9:01 PM EDT -----  Please let pt know that his labs look good overall. His Free T4 is slightly low, I would like a repeat TSH and T4 in 6 weeks to monitor. His electrolytes are in balance and he is not having any congestive heart issues. His GFR is a little low, he does need to increase fluids to help flush the kidneys. Monitor leg swelling and Call office with any questions or concerns, or if symptoms are getting worse or changing.   -WS

## 2020-07-31 NOTE — TELEPHONE ENCOUNTER
Labs looked OK. Is he wearing compression stockings at work? He needs to continue to elevate his legs above the level of his heart 3 times daily for at least 20-30 min. Increase clear fluids and not drink soda. Limit salt in his diet. See how the weekend goes and if the swelling is less when he is not at work. Call with an update on Monday.  -ESTEBAN

## 2020-07-31 NOTE — TELEPHONE ENCOUNTER
Patient's wife notified of results. Lab order mailed. She is still concerned regarding the leg swelling. He is currently on day 4 of a 7 day course of Lasix and she doesn't feel that it is helping. By the end of the work day, feet and ankles are very swollen. She did mention that he is working longer hours and therefore up on his feet more. She didn't want to go into the weekend without letting you know and see if anything further can be done. Please advise.

## 2020-08-03 ENCOUNTER — OFFICE VISIT (OUTPATIENT)
Dept: FAMILY MEDICINE CLINIC | Age: 55
End: 2020-08-03
Payer: COMMERCIAL

## 2020-08-03 VITALS
SYSTOLIC BLOOD PRESSURE: 128 MMHG | BODY MASS INDEX: 37.07 KG/M2 | WEIGHT: 251 LBS | HEART RATE: 80 BPM | DIASTOLIC BLOOD PRESSURE: 76 MMHG | TEMPERATURE: 98.8 F | RESPIRATION RATE: 16 BRPM

## 2020-08-03 PROCEDURE — 99213 OFFICE O/P EST LOW 20 MIN: CPT | Performed by: FAMILY MEDICINE

## 2020-08-03 RX ORDER — LOSARTAN POTASSIUM AND HYDROCHLOROTHIAZIDE 12.5; 1 MG/1; MG/1
1 TABLET ORAL DAILY
Qty: 90 TABLET | Refills: 3 | Status: SHIPPED | OUTPATIENT
Start: 2020-08-03 | End: 2021-04-16

## 2020-08-03 ASSESSMENT — ENCOUNTER SYMPTOMS
GASTROINTESTINAL NEGATIVE: 1
RESPIRATORY NEGATIVE: 1

## 2020-08-04 NOTE — PROGRESS NOTES
Chief Complaint   Patient presents with    Leg Swelling     Bilateral lower extremity edema. Has tried elevating with no benefit.  Knee Pain     Right knee pain for the past month. No know injury. Some swelling. Using ibuprofen with some benefit. Cristian Haque is a 53 y. o.male      Pt complains of ongoing leg swelling. Improved some with lasix. Weight down 3#. His swelling is gone at night after he elevates the legs. It worsens when he stands on his feet at work at [employer]. Denies SOB, CP. He is not limiting his sodium intake. Not wearing compression. He c/o right knee pain. Denies injury. The knee swells. Pain is posterior and lateral.  It is limiting his mobility. Review of Systems   Constitutional: Negative. Negative for fatigue and unexpected weight change. HENT: Negative. Respiratory: Negative. Cardiovascular: Positive for leg swelling. Gastrointestinal: Negative. Genitourinary: Negative. Musculoskeletal: Positive for arthralgias (right knee) and gait problem. All other systems reviewed and are negative. OBJECTIVE     /76   Pulse 80   Temp 98.8 °F (37.1 °C) (Temporal)   Resp 16   Wt 251 lb (113.9 kg)   BMI 37.07 kg/m²     Physical Exam  Vitals signs reviewed. Neck:      Musculoskeletal: Neck supple. Cardiovascular:      Rate and Rhythm: Normal rate and regular rhythm. Heart sounds: No murmur. Pulmonary:      Breath sounds: Normal breath sounds. No wheezing. Musculoskeletal:      Right knee: He exhibits decreased range of motion and swelling. He exhibits normal alignment, no LCL laxity, normal patellar mobility and no MCL laxity. Right lower leg: Edema (trace) present. Left lower leg: Edema (trace) present. Lymphadenopathy:      Cervical: No cervical adenopathy.          Lab Results   Component Value Date     07/29/2020    K 4.3 07/29/2020     07/29/2020    CO2 27 07/29/2020    BUN 16 07/29/2020    CREATININE 1.0 07/29/2020    GLUCOSE 117 07/29/2020    CALCIUM 9.0 07/29/2020      Lab Results   Component Value Date    WBC 6.9 07/29/2020    HGB 15.6 07/29/2020    HCT 49.7 07/29/2020    MCV 83.5 07/29/2020     07/29/2020           ASSESSMENT      1. Chronic pain of right knee    2. Essential hypertension    3.  Bilateral leg edema        PLAN     Requested Prescriptions     Signed Prescriptions Disp Refills    losartan-hydroCHLOROthiazide (HYZAAR) 100-12.5 MG per tablet 90 tablet 3     Sig: Take 1 tablet by mouth daily     Refer to ortho for knee    Finish lasix    Decrease sodium in diet    Wear knee high compression hose    Orders Placed This Encounter   Procedures    External Referral To Orthopedic Surgery     Referral Priority:   Routine     Referral Type:   Eval and Treat     Referral Reason:   Specialty Services Required     Requested Specialty:   Orthopedic Surgery     Number of Visits Requested:   1         Follow up if not better            Electronically signed by Ankur Donohue MD on 8/3/2020 at 9:14 PM

## 2020-08-17 ENCOUNTER — OFFICE VISIT (OUTPATIENT)
Dept: FAMILY MEDICINE CLINIC | Age: 55
End: 2020-08-17
Payer: COMMERCIAL

## 2020-08-17 VITALS
WEIGHT: 252.2 LBS | SYSTOLIC BLOOD PRESSURE: 126 MMHG | RESPIRATION RATE: 14 BRPM | HEART RATE: 72 BPM | TEMPERATURE: 97.5 F | DIASTOLIC BLOOD PRESSURE: 72 MMHG | BODY MASS INDEX: 37.24 KG/M2

## 2020-08-17 PROCEDURE — 99213 OFFICE O/P EST LOW 20 MIN: CPT | Performed by: FAMILY MEDICINE

## 2020-08-17 PROCEDURE — 96372 THER/PROPH/DIAG INJ SC/IM: CPT | Performed by: FAMILY MEDICINE

## 2020-08-17 RX ORDER — TAMSULOSIN HYDROCHLORIDE 0.4 MG/1
0.4 CAPSULE ORAL DAILY
Qty: 90 CAPSULE | Refills: 3 | Status: SHIPPED | OUTPATIENT
Start: 2020-08-17 | End: 2021-08-03 | Stop reason: SDUPTHER

## 2020-08-17 RX ORDER — METHYLPREDNISOLONE ACETATE 80 MG/ML
80 INJECTION, SUSPENSION INTRA-ARTICULAR; INTRALESIONAL; INTRAMUSCULAR; SOFT TISSUE ONCE
Status: COMPLETED | OUTPATIENT
Start: 2020-08-17 | End: 2020-08-17

## 2020-08-17 RX ORDER — SIMVASTATIN 40 MG
40 TABLET ORAL NIGHTLY
Qty: 90 TABLET | Refills: 3 | Status: SHIPPED | OUTPATIENT
Start: 2020-08-17 | End: 2021-08-03 | Stop reason: SDUPTHER

## 2020-08-17 RX ORDER — ESCITALOPRAM OXALATE 10 MG/1
10 TABLET ORAL DAILY
Qty: 90 TABLET | Refills: 3 | Status: SHIPPED | OUTPATIENT
Start: 2020-08-17 | End: 2021-08-03 | Stop reason: SDUPTHER

## 2020-08-17 RX ADMIN — METHYLPREDNISOLONE ACETATE 80 MG: 80 INJECTION, SUSPENSION INTRA-ARTICULAR; INTRALESIONAL; INTRAMUSCULAR; SOFT TISSUE at 16:12

## 2020-08-17 ASSESSMENT — ENCOUNTER SYMPTOMS
ABDOMINAL PAIN: 0
EYES NEGATIVE: 1
CHEST TIGHTNESS: 0
BLOOD IN STOOL: 0
SHORTNESS OF BREATH: 0

## 2020-08-17 NOTE — LETTER
[address] [address]  Phone: [phone]  Fax: [phone]    Uzair Gallardo MD        August 17, 2020     Patient: Dirk Roberts   YOB: 1965   Date of Visit: 8/17/2020       To Whom It May Concern:    Rufina Sanchez has allergies and wearing face mask makes it hard to him to breathe. Please allow him to wear a face shield at work. If you have any questions or concerns, please don't hesitate to call.     Sincerely,        Uzair Gallardo MD

## 2020-08-17 NOTE — PROGRESS NOTES
Chief Complaint   Patient presents with    Rash     rash on hands x 1 month,     Medication Refill         SUBJECTIVE     Everton Vela is a 63 y.o.male      Pt complains of a very itchy rash on the hands over the last month, worse over the last 2 weeks. The itching is intense and he loses sleep over it. No new irritants to his hands, but he does wash them a lot and uses hand  during the pandemic. No new meds. He has had this in the past, but never this bad. He states that he gets little blisters under the skin that make it itch terribly. He has tried OTC cortisone creams, which help a little. He also requests refills of his chronic meds. His leg edema is gone with the use of HERRERA hose. BPs stable. Recent labs ok. Takes all meds as directed and denies side effects. No recent illnesses or hospitalizations. Nonsmoker. Body mass index is 37.24 kg/m². Review of Systems   Constitutional: Negative for chills, fatigue, fever and unexpected weight change. HENT: Negative. Eyes: Negative. Respiratory: Negative for chest tightness and shortness of breath. Cardiovascular: Negative for chest pain, palpitations and leg swelling. Gastrointestinal: Negative for abdominal pain and blood in stool. Genitourinary: Negative for dysuria. Musculoskeletal: Negative for joint swelling. Skin: Positive for rash. Neurological: Negative for dizziness. Psychiatric/Behavioral: Negative. All other systems reviewed and are negative. OBJECTIVE     /72   Pulse 72   Temp 97.5 °F (36.4 °C) (Temporal)   Resp 14   Wt 252 lb 3.2 oz (114.4 kg)   BMI 37.24 kg/m²     Physical Exam  Constitutional:       General: He is not in acute distress. Appearance: He is well-developed. HENT:      Head: Normocephalic and atraumatic.       Right Ear: Tympanic membrane normal.      Left Ear: Tympanic membrane normal.      Mouth/Throat:      Mouth: Mucous membranes are moist.      Pharynx: No

## 2020-08-17 NOTE — PROGRESS NOTES
After obtaining consent, and per orders of Dr. Catherine Foreman, injection of Depo-Medrol 80 mg/ml 1 ml given IM right upper quadrant gluteus by Karina Núñez. Patient tolerated well.

## 2020-08-17 NOTE — LETTER
[address] [address]  Phone: [phone]  Fax: [phone]    Delroy Montenegro MD        August 17, 2020     Patient: Michelle Merino   YOB: 1965   Date of Visit: 8/17/2020       To Whom It May Concern: It is my medical opinion that Andreina Vargas be able to wear a face shield to work instead of a face mask due to allergies. If you have any questions or concerns, please don't hesitate to call.     Sincerely,        Delroy Montenegro MD

## 2020-08-27 ENCOUNTER — HOSPITAL ENCOUNTER (OUTPATIENT)
Dept: AUDIOLOGY | Age: 55
Discharge: HOME | End: 2020-08-27
Payer: COMMERCIAL

## 2020-08-27 PROCEDURE — 9990000010 HC NO CHARGE VISIT: Performed by: AUDIOLOGIST

## 2020-09-03 ENCOUNTER — OFFICE VISIT (OUTPATIENT)
Dept: ENT CLINIC | Age: 55
End: 2020-09-03
Payer: COMMERCIAL

## 2020-09-03 VITALS
WEIGHT: 251.4 LBS | BODY MASS INDEX: 37.23 KG/M2 | HEART RATE: 80 BPM | SYSTOLIC BLOOD PRESSURE: 124 MMHG | RESPIRATION RATE: 14 BRPM | DIASTOLIC BLOOD PRESSURE: 70 MMHG | HEIGHT: 69 IN | TEMPERATURE: 98.1 F

## 2020-09-03 PROCEDURE — 99202 OFFICE O/P NEW SF 15 MIN: CPT | Performed by: PHYSICIAN ASSISTANT

## 2020-09-03 ASSESSMENT — ENCOUNTER SYMPTOMS
VOICE CHANGE: 0
VOMITING: 0
DIARRHEA: 0
RHINORRHEA: 0
SHORTNESS OF BREATH: 0
CHEST TIGHTNESS: 0
APNEA: 0
WHEEZING: 0
COUGH: 0
SORE THROAT: 0
NAUSEA: 0
STRIDOR: 0
ABDOMINAL PAIN: 0
FACIAL SWELLING: 0
SINUS PRESSURE: 0
CHOKING: 0
TROUBLE SWALLOWING: 0
COLOR CHANGE: 0

## 2020-09-03 NOTE — PROGRESS NOTES
[facility]  Sheri Saenz [address] [place] [address]  Dept: [phone]  Dept Fax: [phone]  Loc: [phone]    Latricia Minor is a 63 y.o. male who was referred by No ref. provider found for:  Chief Complaint   Patient presents with    Cerumen Impaction     New patient is here for ear cleaning per audiology    . HPI:     The patient presents for evaluation of cerumen impactions. Patient reports that he has been feeling like his hearing is been gradually decreasing for the last few years. He feels like the right ear is slightly worse than the left. He states he has some difficulty understanding some conversations. He mention these concerns recently to his PCP who recommended getting an audiogram.  The patient was seen in audiology and they noted large cerumen impactions bilaterally and recommended cerumen removal prior to audiogram.  The patient works for [employer] and has to wear earplugs daily. He reports that he will have occasional episodes of tinnitus bilaterally that would last a few seconds. He states he episodes happen 1-2 times a month typically. Denies any vertigo. He does report that his ears sometimes feel full like they need to pop/crack. He denies any otalgia, otorrhea, fevers, chills, acute changes in hearing. He denies any other concerns at this time. Subjective:        Review of Systems   Constitutional: Negative for activity change, appetite change, chills, diaphoresis, fatigue, fever and unexpected weight change. HENT: Positive for hearing loss (gradual bilateral loss, R worse than L) and tinnitus (A few seconds, 1-2 times per month). Negative for congestion, dental problem, ear discharge, ear pain, facial swelling, mouth sores, nosebleeds, postnasal drip, rhinorrhea, sinus pressure, sneezing, sore throat, trouble swallowing and voice change. Eyes: Negative for visual disturbance.    Respiratory: Negative for apnea, cough, choking, chest tightness, shortness of breath, wheezing and stridor. Cardiovascular: Negative for chest pain, palpitations and leg swelling. Gastrointestinal: Negative for abdominal pain, diarrhea, nausea and vomiting. Endocrine: Negative for cold intolerance, heat intolerance, polydipsia and polyuria. Genitourinary: Negative for difficulty urinating, discharge, dysuria, enuresis, hematuria, penile pain, penile swelling, scrotal swelling, testicular pain and urgency. Musculoskeletal: Negative for arthralgias, gait problem, neck pain and neck stiffness. Skin: Negative for color change, rash and wound. Allergic/Immunologic: Negative for environmental allergies, food allergies and immunocompromised state. Neurological: Negative for dizziness, seizures, syncope, facial asymmetry, speech difficulty, light-headedness and headaches. Hematological: Negative for adenopathy. Does not bruise/bleed easily. Psychiatric/Behavioral: Negative for confusion, sleep disturbance and suicidal ideas. The patient is not nervous/anxious. Past Medical History:  Past Medical History:   Diagnosis Date    Allergic rhinitis     BPH (benign prostatic hyperplasia)     Hyperlipidemia     Hypertension     Hypogonadism 9/12/2011    Hypogonadism male     Inflammatory spondylopathies ([facility] [address]     Testosterone deficiency        Social History:    TOBACCO:   reports that he has never smoked. He has never used smokeless tobacco.  ETOH:   reports current alcohol use. DRUGS:   reports no history of drug use.     Family History:       Problem Relation Age of Onset    Cancer Father     High Blood Pressure Mother        Surgical History:  Past Surgical History:   Procedure Laterality Date    BACK SURGERY  08/05/2018    CARPAL TUNNEL RELEASE Left 2015    CERVICAL SPINE SURGERY  06/01/2015    Herniated disc    COLONOSCOPY  2011    ELBOW SURGERY  8/11    Dr. Harrison Boast 08/2017    L5-S1, Dr. Sanjeev Cole  age 15    benign masses removed from chest    SINUS SURGERY          Objective: This is a 63 y.o. male. Patient is alert and oriented to person, place and time. Patient appears well developed, well nourished. Mood is happy with normal affect. Not obviously hearing impaired. No abnormality in speech noted. /70 (Site: Left Upper Arm, Position: Sitting)   Pulse 80   Temp 98.1 °F (36.7 °C) (Infrared)   Resp 14   Ht 5' 9\" (1.753 m)   Wt 251 lb 6.4 oz (114 kg)   BMI 37.13 kg/m²     Head:   Normocephalic, atraumatic. No obvious masses or lesions noted. Ears:  External ears: Normal: no scars, lesions or masses. Mastoid process: No erythema noted. No tenderness to palpation. R External auditory canal: Very large cerumen impaction. Canal appears otherwise normal.  L External auditory canal: Very large cerumen impaction. Canal appears otherwise normal  Tympanic membranes:  R intact, translucent once able to visualize after cerumen removal                                                  L intact, translucent once able to visualize after cerumen removal     Procedure: Bilateral large cerumen impactions were noted. Attempted to remove cerumen bilaterally using suction and alligator forceps under handheld magnification. Initial attempts were unsuccessful so hydrogen peroxide was instilled into each ear to aid with loosening the cerumen. After hydrogen peroxide was used the cerumen was much more manipulated well and able to be removed. The patient reports immediate improvement in his hearing bilaterally. The patient tolerated the procedure without complication. Assessment/Plan:     Diagnosis Orders   1. Hearing loss due to cerumen impaction, bilateral     2. Tinnitus aurium, bilateral     3. Ear fullness, bilateral         The patient is a 63 y.o. male that presents for evaluation of bilateral cerumen impactions.   Cerumen was removed as described above and patient reports significant and immediate improvement in his hearing after extraction. I recommended that the patient still complete his scheduled audiogram with reported intermittent tinnitus and ear fullness. He is very happy with the results of the cerumen removal today. He requested routine ear cleanings in the future. We will schedule a 6-month ear cleaning at this time and may extend the length between visits depending on the amount of cerumen accumulated. The patient expresses understanding of the plan and thanked me. He will contact the office sooner with new/worsening symptoms or any other concerns.     Electronically signed by VANDANA Temple on 9/4/2020 at 10:52 AM

## 2020-09-10 ENCOUNTER — HOSPITAL ENCOUNTER (OUTPATIENT)
Dept: AUDIOLOGY | Age: 55
Discharge: HOME | End: 2020-09-10
Payer: COMMERCIAL

## 2020-09-10 PROCEDURE — 92557 COMPREHENSIVE HEARING TEST: CPT | Performed by: AUDIOLOGIST

## 2020-09-10 PROCEDURE — 92567 TYMPANOMETRY: CPT | Performed by: AUDIOLOGIST

## 2020-09-18 ENCOUNTER — HOSPITAL ENCOUNTER (EMERGENCY)
Age: 55
Discharge: HOME | End: 2020-09-18
Payer: COMMERCIAL

## 2020-09-18 VITALS
HEART RATE: 82 BPM | SYSTOLIC BLOOD PRESSURE: 147 MMHG | TEMPERATURE: 97.6 F | DIASTOLIC BLOOD PRESSURE: 79 MMHG | OXYGEN SATURATION: 97 % | WEIGHT: 237 LBS | HEIGHT: 69 IN | RESPIRATION RATE: 18 BRPM | BODY MASS INDEX: 35.1 KG/M2

## 2020-09-18 PROCEDURE — 6360000002 HC RX W HCPCS: Performed by: NURSE PRACTITIONER

## 2020-09-18 PROCEDURE — 99212 OFFICE O/P EST SF 10 MIN: CPT

## 2020-09-18 PROCEDURE — 96372 THER/PROPH/DIAG INJ SC/IM: CPT

## 2020-09-18 PROCEDURE — 99213 OFFICE O/P EST LOW 20 MIN: CPT | Performed by: NURSE PRACTITIONER

## 2020-09-18 RX ORDER — METHYLPREDNISOLONE ACETATE 80 MG/ML
80 INJECTION, SUSPENSION INTRA-ARTICULAR; INTRALESIONAL; INTRAMUSCULAR; SOFT TISSUE ONCE
Status: COMPLETED | OUTPATIENT
Start: 2020-09-18 | End: 2020-09-18

## 2020-09-18 RX ADMIN — METHYLPREDNISOLONE ACETATE 80 MG: 80 INJECTION, SUSPENSION INTRA-ARTICULAR; INTRALESIONAL; INTRAMUSCULAR; SOFT TISSUE at 08:41

## 2020-09-18 ASSESSMENT — ENCOUNTER SYMPTOMS
NAUSEA: 0
VOMITING: 0

## 2020-09-18 NOTE — ED NOTES
Patient presents to [facility] with complaints of bilateral hand eczema. Patient denies any pain, just itching at this time. Patient reports this started 2-3 days ago.        Bharti Das RN  09/18/20 0489

## 2020-09-18 NOTE — ED PROVIDER NOTES
[address]  Urgent Care Encounter       CHIEF COMPLAINT       Chief Complaint   Patient presents with    Other     dry skin on hands bilaterally        Nurses Notes reviewed and I agree except as noted in the HPI. HISTORY OF PRESENT ILLNESS   Álvaro Ennis is a 63 y.o. male who presents with complaints of an eczema flare on his hands. Patient reports pruritus and swelling and redness of the left hand. Patient has had this condition before and states his doctor gave him a steroid injection which did resolve the issue for approximately 3 months. Current flare started 2 days ago and patient wants to treat quickly so it does not become worse. He reports that he is going to see a dermatologist regarding this problem. The history is provided by the patient. REVIEW OF SYSTEMS     Review of Systems   Constitutional: Negative for chills and fever. Gastrointestinal: Negative for nausea and vomiting. Skin: Positive for rash. Negative for wound. Neurological: Negative for numbness. PAST MEDICAL HISTORY         Diagnosis Date    Allergic rhinitis     BPH (benign prostatic hyperplasia)     Hyperlipidemia     Hypertension     Hypogonadism 9/12/2011    Hypogonadism male     Inflammatory spondylopathies ([facility] [address]     Testosterone deficiency        SURGICALHISTORY     Patient  has a past surgical history that includes Elbow surgery (8/11); sinus surgery; other surgical history (age 15); Cervical spine surgery (06/01/2015); Colonoscopy (2011); Carpal tunnel release (Left, 2015); lumbar fusion (08/2017); back surgery (08/05/2018); and knee surgery (09/01/2020).     CURRENT MEDICATIONS       Discharge Medication List as of 9/18/2020  8:54 AM      CONTINUE these medications which have NOT CHANGED    Details   simvastatin (ZOCOR) 40 MG tablet Take 1 tablet by mouth nightly, Disp-90 tablet,R-3Normal      escitalopram (LEXAPRO) 10 MG tablet Take 1 tablet by mouth daily, Disp-90 No respiratory distress. Skin:     General: Skin is warm and dry. Findings: Erythema present. Comments: Erythema and swelling to the palm of the hand and fingers predominantly on the lateral side with swelling around the thumb and first MCP joint. Neurological:      General: No focal deficit present. Mental Status: He is alert and oriented to person, place, and time. Psychiatric:         Mood and Affect: Mood normal.         Speech: Speech normal.         Behavior: Behavior normal. Behavior is cooperative. DIAGNOSTIC RESULTS     Labs:No results found for this visit on 09/18/20. IMAGING:    No orders to display         EKG:      URGENT CARE COURSE:     Vitals:    09/18/20 0817 09/18/20 0857   BP: (!) 163/85 (!) 147/79   Pulse: 88 82   Resp: 18 18   Temp: 97.6 °F (36.4 °C)    TempSrc: Tympanic    SpO2: 96% 97%   Weight: 237 lb (107.5 kg)    Height: 5' 9\" (1.753 m)        Medications   methylPREDNISolone acetate (DEPO-MEDROL) injection 80 mg (80 mg Intramuscular Given 9/18/20 0841)            PROCEDURES:  None    FINAL IMPRESSION      1. Dyshidrotic eczema          DISPOSITION/ PLAN     Patient presents with complaints of a pruritic rash on the left hand. Chart review reveals previous diagnosis of dyshidrotic eczema. Patient was given 80 mg of Depo-Medrol while in the urgent care center. He is to continue previous treatment as prescribed by his family doctor which includes topical steroid cream.  The patient is going to schedule an appointment with a dermatologist in the near future. He was encouraged to make sure this happens as this is a chronic condition that will need managed. Further instructions were outlined verbally and in the patient's discharge instructions. All the patient's questions were answered. The patient/parent agreed with the plan and was discharged from the [facility] in good condition.       PATIENT REFERRED TO:  Fernando Estrada MD  [address] [facility] / [address] [address]      DISCHARGE MEDICATIONS:  Discharge Medication List as of 9/18/2020  8:54 AM          Discharge Medication List as of 9/18/2020  8:54 AM          Discharge Medication List as of 9/18/2020  8:54 AM          BETTY Shah CNP    (Please note that portions of this note were completed with a voice recognition program. Efforts were made to edit the dictations but occasionally words are mis-transcribed.)         BETTY Shah CNP  09/18/20 0900

## 2020-09-23 ENCOUNTER — TELEPHONE (OUTPATIENT)
Dept: FAMILY MEDICINE CLINIC | Age: 55
End: 2020-09-23

## 2020-11-02 ENCOUNTER — NURSE ONLY (OUTPATIENT)
Dept: LAB | Age: 55
End: 2020-11-02

## 2020-11-02 LAB — PROSTATE SPECIFIC ANTIGEN: 0.61 NG/ML (ref 0–1)

## 2020-11-05 LAB — TESTOSTERONE FREE: NORMAL

## 2020-11-10 ENCOUNTER — OFFICE VISIT (OUTPATIENT)
Dept: UROLOGY | Age: 55
End: 2020-11-10
Payer: COMMERCIAL

## 2020-11-10 VITALS — BODY MASS INDEX: 36.87 KG/M2 | HEIGHT: 69 IN | WEIGHT: 248.9 LBS | TEMPERATURE: 97.3 F

## 2020-11-10 PROCEDURE — 99213 OFFICE O/P EST LOW 20 MIN: CPT | Performed by: UROLOGY

## 2020-11-10 RX ORDER — TESTOSTERONE CYPIONATE 200 MG/ML
INJECTION INTRAMUSCULAR
Qty: 10 ML | Refills: 3 | Status: SHIPPED | OUTPATIENT
Start: 2020-11-10 | End: 2021-05-11 | Stop reason: SDUPTHER

## 2020-11-10 NOTE — PROGRESS NOTES
Antonina Haynes MD        [address] [place] [address] [id number]  Dept: [phone]  Dept Fax: [phone]: [address] [address] Urology Office Note -     Patient:  Elma Mckeon  YOB: 1965    The patient is a 63 y.o. male who presents today for evaluation of the following problems:   Chief Complaint   Patient presents with    Follow-up     Testosterone deficiency         HISTORY OF PRESENT ILLNESS:     hypogonadism  Onset was  Years ago  Overall, the problem(s) are better. Severity is described as mild-moderate. Associated Symptoms: No dysuria, no gross hematuria. Current Pain Severity: 0    Every 2 weeks injections      Secondary Diagnosis:    bph- stable. Doing well on flomax         Summary of Previous Records:  Elma Mckeon f/u today for Testosterone Deficiency. His most recent Testosterone level was 987 on 07/2019. This was obtained mid- cycle. He is currently on Testosterone injections 200 mg every 14 days. He does report improvement in symptoms of sex drive, energy, fatigue and erections. Denies any side effects to the injections, feels sluggish a few days before next injection. Has sleep apnea, well controlled with CPAP machine. Trend of PSA:  0.73 07/2019  0.68 07/15/17  0.37 07/2016  Currently takes Flomax PRN, has intermittent weak stream  Denies any issues with ED  Kandi Dailey comes in by himself . Hx is obtained from the patient and the medical record.       Requested/reviewed records from Rio Jolley MD office and/or outside physician/EMR    (Patient's old records have been requested, reviewed and pertinent findings summarized in today's note.)    Procedures Today: N/A    Last several PSA's:  Lab Results   Component Value Date    PSA 0.61 11/02/2020    PSA 0.73 07/09/2019    PSA 0.52 11/20/2017       Last total testosterone:  Lab Results   Component Value Date TESTOSTERONE 987 (H) 07/09/2019       Urinalysis today:  No results found for this visit on 11/10/20. Last BUN and creatinine:  Lab Results   Component Value Date    BUN 16 07/29/2020     Lab Results   Component Value Date    CREATININE 1.0 07/29/2020       Imaging Reviewed during this Office Visit:   Falguni Kowalski MD independently reviewed the images and verified the radiology reports from:    No results found.     PAST MEDICAL, FAMILY AND SOCIAL HISTORY:  Past Medical History:   Diagnosis Date    Allergic rhinitis     BPH (benign prostatic hyperplasia)     Hyperlipidemia     Hypertension     Hypogonadism 9/12/2011    Hypogonadism male     Inflammatory spondylopathies ([address]     Testosterone deficiency      Past Surgical History:   Procedure Laterality Date    BACK SURGERY  08/05/2018    CARPAL TUNNEL RELEASE Left 2015    CERVICAL SPINE SURGERY  06/01/2015    Herniated disc    COLONOSCOPY  2011    ELBOW SURGERY  8/11    Dr. Pipe Ma elbow   [place]  09/01/2020    LUMBAR FUSION  08/2017    L5-S1, Dr. Kash Granados  age 15    benign masses removed from chest    SINUS SURGERY       Family History   Problem Relation Age of Onset    Cancer Father     High Blood Pressure Mother      Outpatient Medications Marked as Taking for the 11/10/20 encounter (Office Visit) with Sarika Pichardo MD   Medication Sig Dispense Refill    testosterone cypionate (DEPOTESTOTERONE CYPIONATE) 200 MG/ML injection Inject 1 ml in to the muscle every 10 days 10 mL 3    simvastatin (ZOCOR) 40 MG tablet Take 1 tablet by mouth nightly 90 tablet 3    escitalopram (LEXAPRO) 10 MG tablet Take 1 tablet by mouth daily 90 tablet 3    tamsulosin (FLOMAX) 0.4 MG capsule Take 1 capsule by mouth daily 90 capsule 3    losartan-hydroCHLOROthiazide (HYZAAR) 100-12.5 MG per tablet Take 1 tablet by mouth daily 90 tablet 3    furosemide (LASIX) 40 MG tablet Take 40 mg by mouth daily      Syringe/Needle, Disp, (SYRINGE 3CC/22GX1\") 22G X 1\" 3 ML MISC Used to inject Testosterone every 14 days 3 each 5    cetirizine (ZYRTEC) 10 MG tablet Take 1 tablet by mouth daily 30 tablet 0    Esomeprazole Magnesium (NEXIUM PO) Take by mouth as needed OTC         Ace inhibitors; Amoxicillin-pot clavulanate; Avelox [moxifloxacin hcl in nacl]; and Bactrim  Social History     Tobacco Use   Smoking Status Never Smoker   Smokeless Tobacco Never Used      (If patient a smoker, smoking cessation counseling offered)   Social History     Substance and Sexual Activity   Alcohol Use Yes    Comment: social       REVIEW OF SYSTEMS:  Constitutional: negative  Eyes: negative  Respiratory: negative  Cardiovascular: negative  Gastrointestinal: negative  Genitourinary: see HPI  Musculoskeletal: negative  Skin: negative   Neurological: negative  Hematological/Lymphatic: negative  Psychological: negative      Physical Exam:    This a 63 y.o. male  Vitals:    11/10/20 1621   Temp: 97.3 °F (36.3 °C)     Body mass index is 36.76 kg/m². Constitutional: Patient in no acute distress;         Assessment and Plan        1. Benign localized prostatic hyperplasia with lower urinary tract symptoms (LUTS)    2.  Testosterone deficiency               Plan:       Cont flomax  Change regimen to every 10 days as opposed to every 14 days  Needs bloodwork prior to follow up in six months         Prescriptions Ordered:  Orders Placed This Encounter   Medications    testosterone cypionate (DEPOTESTOTERONE CYPIONATE) 200 MG/ML injection     Sig: Inject 1 ml in to the muscle every 10 days     Dispense:  10 mL     Refill:  3      Orders Placed:  Orders Placed This Encounter   Procedures    CBC     Standing Status:   Future     Standing Expiration Date:   11/10/2021    Testosterone     Standing Status:   Future     Standing Expiration Date:   11/10/2021    PSA Prostatic Specific Antigen     Standing Status:   Future     Standing Expiration Date:   11/10/2021    Hepatic Function Panel     Standing Status:   Future     Standing Expiration Date:   11/10/2021            Naa Moser MD

## 2020-11-21 ENCOUNTER — HOSPITAL ENCOUNTER (EMERGENCY)
Age: 55
Discharge: HOME | End: 2020-11-21
Payer: COMMERCIAL

## 2020-11-21 VITALS
HEART RATE: 70 BPM | WEIGHT: 240 LBS | DIASTOLIC BLOOD PRESSURE: 86 MMHG | SYSTOLIC BLOOD PRESSURE: 137 MMHG | HEIGHT: 69 IN | OXYGEN SATURATION: 98 % | RESPIRATION RATE: 18 BRPM | BODY MASS INDEX: 35.55 KG/M2 | TEMPERATURE: 97.7 F

## 2020-11-21 PROCEDURE — 96372 THER/PROPH/DIAG INJ SC/IM: CPT

## 2020-11-21 PROCEDURE — 6360000002 HC RX W HCPCS: Performed by: NURSE PRACTITIONER

## 2020-11-21 PROCEDURE — 99213 OFFICE O/P EST LOW 20 MIN: CPT | Performed by: NURSE PRACTITIONER

## 2020-11-21 PROCEDURE — 99212 OFFICE O/P EST SF 10 MIN: CPT

## 2020-11-21 RX ORDER — METHYLPREDNISOLONE ACETATE 80 MG/ML
80 INJECTION, SUSPENSION INTRA-ARTICULAR; INTRALESIONAL; INTRAMUSCULAR; SOFT TISSUE ONCE
Status: COMPLETED | OUTPATIENT
Start: 2020-11-21 | End: 2020-11-21

## 2020-11-21 RX ADMIN — METHYLPREDNISOLONE ACETATE 80 MG: 80 INJECTION, SUSPENSION INTRA-ARTICULAR; INTRALESIONAL; INTRAMUSCULAR; SOFT TISSUE at 09:19

## 2020-11-21 ASSESSMENT — PAIN DESCRIPTION - PAIN TYPE: TYPE: ACUTE PAIN

## 2020-11-21 ASSESSMENT — ENCOUNTER SYMPTOMS
COLOR CHANGE: 1
ROS SKIN COMMENTS: BILATERAL HANDS
THROAT SWELLING: 0
SHORTNESS OF BREATH: 0

## 2020-11-21 ASSESSMENT — PAIN DESCRIPTION - ORIENTATION: ORIENTATION: RIGHT;LEFT

## 2020-11-21 ASSESSMENT — PAIN DESCRIPTION - LOCATION: LOCATION: HAND

## 2020-11-21 ASSESSMENT — PAIN SCALES - GENERAL: PAINLEVEL_OUTOF10: 4

## 2020-11-21 ASSESSMENT — PAIN DESCRIPTION - DESCRIPTORS: DESCRIPTORS: ITCHING

## 2020-11-21 ASSESSMENT — PAIN DESCRIPTION - FREQUENCY: FREQUENCY: CONTINUOUS

## 2020-11-21 NOTE — ED NOTES
Patient verbalized understanding of discharge instructions and medications prescribed. Denies questions or concerns at this time .      Albertina López RN  11/21/20 3121

## 2020-11-21 NOTE — ED PROVIDER NOTES
[address]  Urgent Care Encounter       CHIEF COMPLAINT       Chief Complaint   Patient presents with    Rash       Nurses Notes reviewed and I agree except as noted in the HPI. HISTORY OF PRESENT ILLNESS   Barnie Denver is a 63 y.o. male who presents to the urgent care center with a recurrent rash to both hands. Patient does complain of redness and itching and rates his discomfort 4 on a 10 scale. The patient states that he works at [employer] and he does work with different types of chemicals and apparently this is a recurrent type of rash only to the hands. The patient states that when he has this rash they usually give him a steroid injection seems to get better and then may recur again. Patient denies any rashes or discomfort anywhere else on the body. The history is provided by the patient. No  was used. Rash   Location:  Hand  Hand rash location:  L hand and R hand  Quality: itchiness and redness    Severity:  Mild  Onset quality:  Gradual  Timing:  Constant  Progression:  Waxing and waning  Chronicity:  Recurrent  Context: chemical exposure    Relieved by:  None tried  Worsened by:  Continued exposure to allergens (Fluids from work)  Associated symptoms: no fever, no shortness of breath, no throat swelling and no tongue swelling        REVIEW OF SYSTEMS     Review of Systems   Constitutional: Negative for chills and fever. Respiratory: Negative for shortness of breath. Skin: Positive for color change and rash.         Bilateral hands       PAST MEDICAL HISTORY         Diagnosis Date    Allergic rhinitis     BPH (benign prostatic hyperplasia)     Hyperlipidemia     Hypertension     Hypogonadism 9/12/2011    Hypogonadism male     Inflammatory spondylopathies ([facility] [address]     Testosterone deficiency        SURGICALHISTORY     Patient  has a past surgical history that includes Elbow surgery (8/11); sinus surgery; other surgical history (age 15); Cervical spine surgery (06/01/2015); Colonoscopy (2011); Carpal tunnel release (Left, 2015); lumbar fusion (08/2017); back surgery (08/05/2018); and knee surgery (09/01/2020). CURRENT MEDICATIONS       Discharge Medication List as of 11/21/2020  9:21 AM      CONTINUE these medications which have NOT CHANGED    Details   testosterone cypionate (DEPOTESTOTERONE CYPIONATE) 200 MG/ML injection Inject 1 ml in to the muscle every 10 days, Disp-10 mL,R-3Normal      simvastatin (ZOCOR) 40 MG tablet Take 1 tablet by mouth nightly, Disp-90 tablet,R-3Normal      escitalopram (LEXAPRO) 10 MG tablet Take 1 tablet by mouth daily, Disp-90 tablet,R-3Normal      losartan-hydroCHLOROthiazide (HYZAAR) 100-12.5 MG per tablet Take 1 tablet by mouth daily, Disp-90 tablet,R-3Normal      furosemide (LASIX) 40 MG tablet Take 40 mg by mouth dailyHistorical Med      tamsulosin (FLOMAX) 0.4 MG capsule Take 1 capsule by mouth daily, Disp-90 capsule,R-3Normal      Syringe/Needle, Disp, (SYRINGE 3CC/22GX1\") 22G X 1\" 3 ML MISC Disp-3 each, R-5, NormalUsed to inject Testosterone every 14 days      cetirizine (ZYRTEC) 10 MG tablet Take 1 tablet by mouth daily, Disp-30 tablet, R-0Normal      Esomeprazole Magnesium (NEXIUM PO) Take by mouth as needed OTCHistorical Med             ALLERGIES     Patient is is allergic to ace inhibitors; amoxicillin-pot clavulanate; avelox [moxifloxacin hcl in nacl]; and bactrim. Patients   Immunization History   Administered Date(s) Administered    Influenza A (H5N1) Monovalent Vaccine, Adjuvanted-2013 12/06/2017    Influenza, MDCK Quadv, IM, PF (Flucelvax 4 yrs and older) 10/18/2020    Tdap (Boostrix, Adacel) 07/01/2019       FAMILY HISTORY     Patient's family history includes Cancer in his father; High Blood Pressure in his mother. SOCIAL HISTORY     Patient  reports that he has never smoked. He has never used smokeless tobacco. He reports current alcohol use.  He reports that he does not use drugs.    PHYSICAL EXAM     ED TRIAGE VITALS  BP: 137/86, Temp: 97.7 °F (36.5 °C), Pulse: 70, Resp: 18, SpO2: 98 %,Estimated body mass index is 35.44 kg/m² as calculated from the following:    Height as of this encounter: 5' 9\" (1.753 m). Weight as of this encounter: 240 lb (108.9 kg). ,No LMP for male patient. Physical Exam  Vitals signs and nursing note reviewed. Constitutional:       General: He is not in acute distress. Appearance: He is well-developed. He is not ill-appearing, toxic-appearing or diaphoretic. HENT:      Head: Normocephalic. Nose: Nose normal.   Neck:      Musculoskeletal: Normal range of motion. Cardiovascular:      Rate and Rhythm: Normal rate. Pulmonary:      Effort: Pulmonary effort is normal.   Skin:     General: Skin is warm and dry. Coloration: Skin is not pale. Findings: Erythema and rash present. No abrasion, ecchymosis, laceration or petechiae. Rash is not purpuric, pustular, urticarial or vesicular. Comments: Bilateral hands patient has erythema noted to the palmar surface as well as the dorsal aspect of the hands. There is no vesicular lesions bullous lesions or fissures noted. No drainage noted no signs of cellulitis. Neurological:      Mental Status: He is alert and oriented to person, place, and time. Psychiatric:         Mood and Affect: Mood normal.         Behavior: Behavior normal. Behavior is cooperative. DIAGNOSTIC RESULTS     Labs:No results found for this visit on 11/21/20. IMAGING:    No orders to display         EKG:      URGENT CARE COURSE:     Vitals:    11/21/20 0845   BP: 137/86   Pulse: 70   Resp: 18   Temp: 97.7 °F (36.5 °C)   SpO2: 98%   Weight: 240 lb (108.9 kg)   Height: 5' 9\" (1.753 m)       Medications   methylPREDNISolone acetate (DEPO-MEDROL) injection 80 mg (80 mg Intramuscular Given 11/21/20 0919)            PROCEDURES:  None    FINAL IMPRESSION      1.  Irritant hand dermatitis          DISPOSITION/ PLAN Take Medication as Directed  Benadryl for itch, Don't scratch  Monitor for any increase in size or spreading  Monitor for fever or chills  Keep drainage covered if any. Follow up with your PCP or return as needed  Or go to the emergency Department      PATIENT REFERRED TO:  Zoraida Cassidy MD  [address] / [address] [address]      DISCHARGE MEDICATIONS:  Discharge Medication List as of 11/21/2020  9:21 AM      START taking these medications    Details   hydrocortisone 2.5 % cream Apply topically 2 times daily. , Disp-20 g,R-0, Normal             Discharge Medication List as of 11/21/2020  9:21 AM          Discharge Medication List as of 11/21/2020  9:21 AM          BETTY Chacon CNP    (Please note that portions of this note were completed with a voice recognition program. Efforts were made to edit the dictations but occasionally words are mis-transcribed.)           BETTY Chacon CNP  11/21/20 3664

## 2020-11-21 NOTE — ED TRIAGE NOTES
Patient states he has had white raised bumps on both his hands that itches for the last 3 weeks. Patient states it has gotten worse over the last few years.

## 2020-11-24 ENCOUNTER — TELEPHONE (OUTPATIENT)
Dept: FAMILY MEDICINE CLINIC | Age: 55
End: 2020-11-24

## 2020-12-30 ENCOUNTER — TELEPHONE (OUTPATIENT)
Dept: FAMILY MEDICINE CLINIC | Age: 55
End: 2020-12-30

## 2020-12-30 RX ORDER — CIPROFLOXACIN 500 MG/1
500 TABLET, FILM COATED ORAL 2 TIMES DAILY
Qty: 28 TABLET | Refills: 0 | Status: SHIPPED | OUTPATIENT
Start: 2020-12-30 | End: 2022-04-08 | Stop reason: SDUPTHER

## 2020-12-30 NOTE — TELEPHONE ENCOUNTER
Pt left message at 951am  He has prostatitis again, he states he gets this a couple times a year and CG calls in rx for him  [facility] for rx?  [phone]

## 2021-01-19 ENCOUNTER — TELEPHONE (OUTPATIENT)
Dept: UROLOGY | Age: 56
End: 2021-01-19

## 2021-01-19 NOTE — TELEPHONE ENCOUNTER
Wife Corbyreid Hester (on hippa) called in and said testosterone needed PA. PA initiated with [facility].

## 2021-03-31 ENCOUNTER — VIRTUAL VISIT (OUTPATIENT)
Dept: FAMILY MEDICINE CLINIC | Age: 56
End: 2021-03-31
Payer: COMMERCIAL

## 2021-03-31 DIAGNOSIS — R52 GENERALIZED BODY ACHES: ICD-10-CM

## 2021-03-31 DIAGNOSIS — J06.9 VIRAL URI: ICD-10-CM

## 2021-03-31 DIAGNOSIS — R05.9 COUGH: Primary | ICD-10-CM

## 2021-03-31 PROCEDURE — 99213 OFFICE O/P EST LOW 20 MIN: CPT | Performed by: NURSE PRACTITIONER

## 2021-03-31 RX ORDER — PREDNISONE 10 MG/1
10 TABLET ORAL 2 TIMES DAILY
Qty: 10 TABLET | Refills: 0 | Status: SHIPPED | OUTPATIENT
Start: 2021-03-31 | End: 2021-04-05

## 2021-03-31 RX ORDER — BENZONATATE 100 MG/1
100 CAPSULE ORAL 3 TIMES DAILY PRN
Qty: 30 CAPSULE | Refills: 0 | Status: SHIPPED | OUTPATIENT
Start: 2021-03-31 | End: 2021-04-07

## 2021-03-31 ASSESSMENT — ENCOUNTER SYMPTOMS
SHORTNESS OF BREATH: 0
HEMOPTYSIS: 0
COUGH: 1
SORE THROAT: 0
WHEEZING: 0
HEARTBURN: 0
RHINORRHEA: 1

## 2021-03-31 NOTE — PATIENT INSTRUCTIONS
Patient Education        Cough: Care Instructions  Your Care Instructions     A cough is your body's response to something that bothers your throat or airways. Many things can cause a cough. You might cough because of a cold or the flu, bronchitis, or asthma. Smoking, postnasal drip, allergies, and stomach acid that backs up into your throat also can cause coughs. A cough is a symptom, not a disease. Most coughs stop when the cause, such as a cold, goes away. You can take a few steps at home to cough less and feel better. Follow-up care is a key part of your treatment and safety. Be sure to make and go to all appointments, and call your doctor if you are having problems. It's also a good idea to know your test results and keep a list of the medicines you take. How can you care for yourself at home? · Drink lots of water and other fluids. This helps thin the mucus and soothes a dry or sore throat. Honey or lemon juice in hot water or tea may ease a dry cough. · Take cough medicine as directed by your doctor. · Prop up your head on pillows to help you breathe and ease a dry cough. · Try cough drops to soothe a dry or sore throat. Cough drops don't stop a cough. Medicine-flavored cough drops are no better than candy-flavored drops or hard candy. · Do not smoke. Avoid secondhand smoke. If you need help quitting, talk to your doctor about stop-smoking programs and medicines. These can increase your chances of quitting for good. When should you call for help? Call 911 anytime you think you may need emergency care. For example, call if:    · You have severe trouble breathing. Call your doctor now or seek immediate medical care if:    · You cough up blood.     · You have new or worse trouble breathing.     · You have a new or higher fever.     · You have a new rash.    Watch closely for changes in your health, and be sure to contact your doctor if:    · You cough more deeply or more often, especially if you notice more mucus or a change in the color of your mucus.     · You have new symptoms, such as a sore throat, an earache, or sinus pain.     · You do not get better as expected. Where can you learn more? Go to https://[email or web address].mapp2link. org and sign in to your algrano account. Enter D279 in the [facility] box to learn more about \"Cough: Care Instructions. \"     If you do not have an account, please click on the \"Sign Up Now\" link. Current as of: October 26, 2020               Content Version: 12.8  © 0124-4612 [employer]. Care instructions adapted under license by [facility] ([facility]). If you have questions about a medical condition or this instruction, always ask your healthcare professional. [address] any warranty or liability for your use of this information. Patient Education        Learning About Coronavirus [phone])  What is coronavirus (COVID-19)? COVID-19 is a disease caused by a new type of coronavirus. This illness was first found in December 2019. It has since spread worldwide. Coronaviruses are a large group of viruses. They cause the common cold. They also cause more serious illnesses like Middle East respiratory syndrome (MERS) and severe acute respiratory syndrome (SARS). COVID-19 is caused by a novel coronavirus. That means it's a new type that has not been seen in people before. What are the symptoms? Coronavirus (COVID-19) symptoms may include:  · Fever. · Cough. · Trouble breathing. · Chills or repeated shaking with chills. · Muscle pain. · Headache. · Sore throat. · New loss of taste or smell. · Vomiting. · Diarrhea. In severe cases, COVID-19 can cause pneumonia and make it hard to breathe without help from a machine. It can cause death. How is it diagnosed?   COVID-19 is diagnosed with a viral test. This may also be called a PCR test or antigen test. It looks for evidence of the virus in your breathing passages or lungs (respiratory system). The test is most often done on a sample from the nose, throat, or lungs. It's sometimes done on a sample of saliva. One way a sample is collected is by putting a long swab into the back of your nose. How is it treated? Mild cases of COVID-19 can be treated at home. Serious cases need treatment in the hospital. Treatment may include medicines to reduce symptoms, plus breathing support such as oxygen therapy or a ventilator. Some people may be placed on their belly to help their oxygen levels. Treatments that may help people who have COVID-19 include:  Antiviral medicines. These medicines treat viral infections. Remdesivir is an example. Immune-based therapy. These medicines help the immune system fight COVID-19. One example is bamlanivimab. It's a monoclonal antibody. Blood thinners. These medicines help prevent blood clots. People with severe illness are at risk for blood clots. How can you protect yourself and others? The best way to protect yourself from getting sick is to:  · Avoid areas where there is an outbreak. · Avoid contact with people who may be infected. · Avoid crowds and try to stay at least 6 feet away from other people. · Wash your hands often, especially after you cough or sneeze. Use soap and water, and scrub for at least 20 seconds. If soap and water aren't available, use an alcohol-based hand . · Avoid touching your mouth, nose, and eyes. To help avoid spreading the virus to others:  · Stay home if you are sick or have been exposed to the virus. Don't go to school, work, or public areas. And don't use public transportation, ride-shares, or taxis unless you have no choice. · Wear a cloth face cover if you have to go to public areas. · Cover your mouth with a tissue when you cough or sneeze. Then throw the tissue in the trash and wash your hands right away.   · If you're sick:  ? Leave your home only if you need to get medical care. But call the doctor's office first so they know you're coming. And wear a face cover. ? Wear the face cover whenever you're around other people. It can help stop the spread of the virus when you cough or sneeze. ? Limit contact with pets and people in your home. If possible, stay in a separate bedroom and use a separate bathroom. ? Clean and disinfect your home every day. Use household  and disinfectant wipes or sprays. Take special care to clean things that you grab with your hands. These include doorknobs, remote controls, phones, and handles on your refrigerator and microwave. And don't forget countertops, tabletops, bathrooms, and computer keyboards. When should you call for help? Call 911 anytime you think you may need emergency care. For example, call if you have life-threatening symptoms, such as:    · You have severe trouble breathing. (You can't talk at all.)     · You have constant chest pain or pressure.     · You are severely dizzy or lightheaded.     · You are confused or can't think clearly.     · Your face and lips have a blue color.     · You pass out (lose consciousness) or are very hard to wake up. Call your doctor now or seek immediate medical care if:    · You have moderate trouble breathing. (You can't speak a full sentence.)     · You are coughing up blood (more than about 1 teaspoon).     · You have signs of low blood pressure. These include feeling lightheaded; being too weak to stand; and having cold, pale, clammy skin. Watch closely for changes in your health, and be sure to contact your doctor if:    · Your symptoms get worse.     · You are not getting better as expected. Call before you go to the doctor's office. Follow their instructions. And wear a cloth face cover. Current as of: December 18, 2020               Content Version: 12.8  © 2006-2021 [employer]. Care instructions adapted under license by [facility] ([facility]).  If you have questions about a medical condition or this instruction, always ask your healthcare professional. [place] [address] any warranty or liability for your use of this information.

## 2021-03-31 NOTE — PROGRESS NOTES
Fernando Zavaleta (:  1965) is a 63 y.o. male,Established patient, here for evaluation of the following chief complaint(s): Congestion, Generalized Body Aches, and Concern For COVID-19      ASSESSMENT/PLAN:  1. Cough  -     COVID-19; Future  -     predniSONE (DELTASONE) 10 MG tablet; Take 1 tablet by mouth 2 times daily for 5 days, Disp-10 tablet, R-0Normal  -     benzonatate (TESSALON) 100 MG capsule; Take 1 capsule by mouth 3 times daily as needed for Cough, Disp-30 capsule, R-0Normal  2. Generalized body aches  -     COVID-19; Future  -     predniSONE (DELTASONE) 10 MG tablet; Take 1 tablet by mouth 2 times daily for 5 days, Disp-10 tablet, R-0Normal  -     benzonatate (TESSALON) 100 MG capsule; Take 1 capsule by mouth 3 times daily as needed for Cough, Disp-30 capsule, R-0Normal  3. Viral URI    - Rest and increase fluids  - Tylenol as needed for pain and fever  - Isolate until testing results are back. Recommended pt get tested, however, he is past his quarantine time and might wait. Ordered testing, pt can have completed at [address] [place] [address].   - Good handwashing and clean all surfaces touched  - Call office with any questions or concerns, or if symptoms are getting worse or changing  - ER for any chest pain or SOB      Return if symptoms worsen or fail to improve. SUBJECTIVE/OBJECTIVE:  Pt presents to the office today via VV for cough, congestion and fatigue. Started with symptoms on 3/16/2021. Thinks he has COVID. No energy. Runny nose. Body aches and cough. Can't taste anything. Feeling better overall, but thinks he should be tested. Denies any fever or chills currently, but did have this around 3/16/2021. No chest pain or SOB currently. Cough  This is a new problem. The current episode started 1 to 4 weeks ago. The problem has been gradually improving. The problem occurs every few minutes. The cough is non-productive.  Associated symptoms include chills, headaches, myalgias, nasal congestion and (Cranial nerve 7 motor function) (limited exam due to video visit)          [x] No gaze palsy        [] Abnormal -          Skin:        [x] No significant exanthematous lesions or discoloration noted on facial skin         [] Abnormal -            Psychiatric:       [x] Normal Affect [] Abnormal -        [x] No Hallucinations    Other pertinent observable physical exam findings:- none      Irving Jefferson, was evaluated through a synchronous (real-time) audio-video encounter. The patient (or guardian if applicable) is aware that this is a billable service. Verbal consent to proceed has been obtained within the past 12 months. The visit was conducted pursuant to the emergency declaration under the [facility], [address] [place] [address] authority and the [employer] General Act. Patient identification was verified, and a caregiver was present when appropriate. The patient was located in a state where the provider was credentialed to provide care. An electronic signature was used to authenticate this note.     --BETTY Wang - CNP

## 2021-04-15 DIAGNOSIS — I10 ESSENTIAL HYPERTENSION: ICD-10-CM

## 2021-04-16 RX ORDER — LOSARTAN POTASSIUM AND HYDROCHLOROTHIAZIDE 12.5; 1 MG/1; MG/1
TABLET ORAL
Qty: 90 TABLET | Refills: 3 | Status: SHIPPED | OUTPATIENT
Start: 2021-04-16 | End: 2022-03-31 | Stop reason: SDUPTHER

## 2021-05-05 ENCOUNTER — NURSE ONLY (OUTPATIENT)
Dept: LAB | Age: 56
End: 2021-05-05

## 2021-05-05 DIAGNOSIS — E34.9 TESTOSTERONE DEFICIENCY: ICD-10-CM

## 2021-05-05 LAB
ALBUMIN SERPL-MCNC: 4 G/DL (ref 3.5–5.1)
ALP BLD-CCNC: 80 U/L (ref 38–126)
ALT SERPL-CCNC: 31 U/L (ref 11–66)
AST SERPL-CCNC: 32 U/L (ref 5–40)
BILIRUB SERPL-MCNC: 0.2 MG/DL (ref 0.3–1.2)
BILIRUBIN DIRECT: < 0.2 MG/DL (ref 0–0.3)
ERYTHROCYTE [DISTWIDTH] IN BLOOD BY AUTOMATED COUNT: 18.6 % (ref 11.5–14.5)
ERYTHROCYTE [DISTWIDTH] IN BLOOD BY AUTOMATED COUNT: 53.8 FL (ref 35–45)
HCT VFR BLD CALC: 52.6 % (ref 42–52)
HEMOGLOBIN: 16.1 GM/DL (ref 14–18)
MCH RBC QN AUTO: 25.9 PG (ref 26–33)
MCHC RBC AUTO-ENTMCNC: 30.6 GM/DL (ref 32.2–35.5)
MCV RBC AUTO: 84.6 FL (ref 80–94)
PLATELET # BLD: 265 THOU/MM3 (ref 130–400)
PMV BLD AUTO: 12 FL (ref 9.4–12.4)
PROSTATE SPECIFIC ANTIGEN: 0.78 NG/ML (ref 0–1)
RBC # BLD: 6.22 MILL/MM3 (ref 4.7–6.1)
TOTAL PROTEIN: 6.6 G/DL (ref 6.1–8)
WBC # BLD: 7.4 THOU/MM3 (ref 4.8–10.8)

## 2021-05-07 LAB — TESTOSTERONE TOTAL: 780 NG/DL (ref 300–890)

## 2021-05-11 ENCOUNTER — OFFICE VISIT (OUTPATIENT)
Dept: UROLOGY | Age: 56
End: 2021-05-11
Payer: COMMERCIAL

## 2021-05-11 VITALS
BODY MASS INDEX: 37.36 KG/M2 | DIASTOLIC BLOOD PRESSURE: 72 MMHG | HEIGHT: 69 IN | WEIGHT: 252.2 LBS | SYSTOLIC BLOOD PRESSURE: 130 MMHG

## 2021-05-11 DIAGNOSIS — E34.9 TESTOSTERONE DEFICIENCY: Primary | ICD-10-CM

## 2021-05-11 DIAGNOSIS — N40.1 BENIGN LOCALIZED PROSTATIC HYPERPLASIA WITH LOWER URINARY TRACT SYMPTOMS (LUTS): ICD-10-CM

## 2021-05-11 PROCEDURE — 99214 OFFICE O/P EST MOD 30 MIN: CPT | Performed by: UROLOGY

## 2021-05-11 RX ORDER — TESTOSTERONE CYPIONATE 200 MG/ML
INJECTION INTRAMUSCULAR
Qty: 10 ML | Refills: 3 | Status: SHIPPED | OUTPATIENT
Start: 2021-05-11 | End: 2022-07-12

## 2021-05-11 NOTE — PROGRESS NOTES
Juju Jackson MD        [address] [address]  Dept: [phone]  Dept Fax: [phone]: [address] [address] Urology Office Note -     Patient:  Javed Hamilton  YOB: 1965    The patient is a 63 y.o. male who presents today for evaluation of the following problems:   Chief Complaint   Patient presents with    Follow-up     testosterone deficiency, labs prior        HISTORY OF PRESENT ILLNESS:     hypogonadism  undergoing 10 days injections  Doing well  Here to discuss labwork      bph- stable. Doing well on flomax         Summary of Previous Records:  Javed Hamilton f/u today for Testosterone Deficiency. His most recent Testosterone level was 987 on 07/2019. This was obtained mid- cycle. He is currently on Testosterone injections 200 mg every 14 days. He does report improvement in symptoms of sex drive, energy, fatigue and erections. Denies any side effects to the injections, feels sluggish a few days before next injection. Has sleep apnea, well controlled with CPAP machine. Trend of PSA:  0.73 07/2019  0.68 07/15/17  0.37 07/2016  Currently takes Flomax PRN, has intermittent weak stream  Denies any issues with ED  Steffany Coins comes in by himself . Hx is obtained from the patient and the medical record. Requested/reviewed records from Tien Mota MD office and/or outside physician/EMR    (Patient's old records have been requested, reviewed and pertinent findings summarized in today's note.)    Procedures Today: N/A    Last several PSA's:  Lab Results   Component Value Date    PSA 0.78 05/05/2021    PSA 0.61 11/02/2020    PSA 0.73 07/09/2019       Last total testosterone:  Lab Results   Component Value Date    TESTOSTERONE 780 05/05/2021       Urinalysis today:  No results found for this visit on 05/11/21.     Last BUN and creatinine:  Lab Results   Component Value Date cetirizine (ZYRTEC) 10 MG tablet Take 1 tablet by mouth daily 30 tablet 0    Esomeprazole Magnesium (NEXIUM PO) Take by mouth as needed OTC         Ace inhibitors, Amoxicillin-pot clavulanate, Avelox [moxifloxacin hcl in nacl], and Bactrim  Social History     Tobacco Use   Smoking Status Never Smoker   Smokeless Tobacco Never Used      (If patient a smoker, smoking cessation counseling offered)   Social History     Substance and Sexual Activity   Alcohol Use Yes    Comment: social       REVIEW OF SYSTEMS:  Constitutional: negative  Eyes: negative  Respiratory: negative  Cardiovascular: negative  Gastrointestinal: negative  Genitourinary: see HPI  Musculoskeletal: negative  Skin: negative   Neurological: negative  Hematological/Lymphatic: negative  Psychological: negative      Physical Exam:    This a 63 y.o. male  Vitals:    05/11/21 1156   BP: 130/72     Body mass index is 37.24 kg/m². Constitutional: Patient in no acute distress;         Assessment and Plan        1. Testosterone deficiency    2. Benign localized prostatic hyperplasia with lower urinary tract symptoms (LUTS)               Plan:       Cont flomax  Cont every 10 days injections. Refilled for patient  Needs bloodwork to assess for TRT toxicity prior to follow up in six months             Prescriptions Ordered:  No orders of the defined types were placed in this encounter. Orders Placed:  No orders of the defined types were placed in this encounter.            Reed Parisi MD

## 2021-06-28 ENCOUNTER — TELEPHONE (OUTPATIENT)
Dept: FAMILY MEDICINE CLINIC | Age: 56
End: 2021-06-28

## 2021-06-28 ENCOUNTER — OFFICE VISIT (OUTPATIENT)
Dept: FAMILY MEDICINE CLINIC | Age: 56
End: 2021-06-28
Payer: COMMERCIAL

## 2021-06-28 VITALS
DIASTOLIC BLOOD PRESSURE: 86 MMHG | HEART RATE: 88 BPM | WEIGHT: 241 LBS | BODY MASS INDEX: 35.59 KG/M2 | SYSTOLIC BLOOD PRESSURE: 126 MMHG | RESPIRATION RATE: 16 BRPM

## 2021-06-28 DIAGNOSIS — L23.7 POISON IVY DERMATITIS: Primary | ICD-10-CM

## 2021-06-28 PROCEDURE — 96372 THER/PROPH/DIAG INJ SC/IM: CPT | Performed by: FAMILY MEDICINE

## 2021-06-28 PROCEDURE — 99213 OFFICE O/P EST LOW 20 MIN: CPT | Performed by: FAMILY MEDICINE

## 2021-06-28 RX ORDER — METHYLPREDNISOLONE ACETATE 80 MG/ML
80 INJECTION, SUSPENSION INTRA-ARTICULAR; INTRALESIONAL; INTRAMUSCULAR; SOFT TISSUE ONCE
Status: COMPLETED | OUTPATIENT
Start: 2021-06-28 | End: 2021-06-28

## 2021-06-28 RX ADMIN — METHYLPREDNISOLONE ACETATE 80 MG: 80 INJECTION, SUSPENSION INTRA-ARTICULAR; INTRALESIONAL; INTRAMUSCULAR; SOFT TISSUE at 13:54

## 2021-06-28 ASSESSMENT — ENCOUNTER SYMPTOMS
RESPIRATORY NEGATIVE: 1
GASTROINTESTINAL NEGATIVE: 1

## 2021-06-28 ASSESSMENT — PATIENT HEALTH QUESTIONNAIRE - PHQ9
SUM OF ALL RESPONSES TO PHQ9 QUESTIONS 1 & 2: 0
SUM OF ALL RESPONSES TO PHQ QUESTIONS 1-9: 0
SUM OF ALL RESPONSES TO PHQ QUESTIONS 1-9: 0
1. LITTLE INTEREST OR PLEASURE IN DOING THINGS: 0
2. FEELING DOWN, DEPRESSED OR HOPELESS: 0
SUM OF ALL RESPONSES TO PHQ QUESTIONS 1-9: 0

## 2021-06-28 NOTE — PROGRESS NOTES
After obtaining consent, and per orders of Dr. Cameron Robledo, injection of Depo-Medrol 80 mg/ml 1 ml given IM left upper quadrant gluteus by NAT CRUZ CMA ([facility]). Patient tolerated.

## 2021-06-28 NOTE — TELEPHONE ENCOUNTER
Patient c/o having poison oak after cutting wood over the weekend. Has had several times in the past and states that it is the same. Calls today for an appointment or injection (states that he has had Depo-Medrol in the past with great benefit)  No openings, please advise.

## 2021-06-28 NOTE — PROGRESS NOTES
2021    Hallie Mendoza (:  1965) is a 63 y.o. male,Established patient, here for evaluation of the following chief complaint(s):  Poison Ivy (C/O poison ivy on the left arm mainly, spreading onto inner right thigh and left arm. Noticed it about three days ago. )      ASSESSMENT/PLAN:    1. Poison ivy dermatitis  -     methylPREDNISolone acetate (DEPO-MEDROL) injection 80 mg; 80 mg, Intramuscular, ONCE, On Mon 21 at 1345, For 1 dose      DepoMedrol 80mg IM    Topical Benadryl prn itching    Follow up if not better      SUBJECTIVE/OBJECTIVE:    HPI     Here with c/o a spreading rash on the left and right forearms, leg upper leg. Rash is itchy and red. It is weeping clear fluid in some areas. He is getting more spots. He was working with [employer] and [employer] the day before the rash started. He has tried topical bleach, gasoline, benadryl. No new soaps, lotions, detergents. He is otherwise well. Review of Systems   Constitutional: Negative for fatigue and fever. HENT: Negative. Respiratory: Negative. Cardiovascular: Negative. Gastrointestinal: Negative. Musculoskeletal: Negative for arthralgias and joint swelling. Skin: Positive for rash. Neurological: Negative for headaches. All other systems reviewed and are negative. Vitals:    21 1310   BP: 126/86   Pulse: 88   Resp: 16   Weight: 241 lb (109.3 kg)       Wt Readings from Last 3 Encounters:   21 241 lb (109.3 kg)   21 252 lb 3.2 oz (114.4 kg)   20 240 lb (108.9 kg)       BP Readings from Last 3 Encounters:   21 126/86   21 130/72   20 137/86       Physical Exam  Constitutional:       General: He is not in acute distress. Appearance: He is well-developed. HENT:      Head: Normocephalic and atraumatic.       Right Ear: Tympanic membrane normal.      Left Ear: Tympanic membrane normal.      Mouth/Throat:      Mouth: Mucous membranes are moist.      Pharynx: No posterior oropharyngeal erythema. Eyes:      Conjunctiva/sclera: Conjunctivae normal.   Cardiovascular:      Rate and Rhythm: Normal rate and regular rhythm. Heart sounds: No murmur heard. Pulmonary:      Breath sounds: Normal breath sounds. No wheezing. Musculoskeletal:      Right lower leg: No edema. Left lower leg: No edema. Lymphadenopathy:      Cervical: No cervical adenopathy. Skin:         Neurological:      Mental Status: He is alert. An electronic signature was used to authenticate this note.         Electronically signed by Carlos Henry MD on 6/28/2021 at 1:32 PM

## 2021-07-02 ENCOUNTER — TELEPHONE (OUTPATIENT)
Dept: FAMILY MEDICINE CLINIC | Age: 56
End: 2021-07-02

## 2021-07-02 RX ORDER — HYDROXYZINE HYDROCHLORIDE 25 MG/1
25 TABLET, FILM COATED ORAL EVERY 8 HOURS PRN
Qty: 30 TABLET | Refills: 0 | Status: SHIPPED | OUTPATIENT
Start: 2021-07-02 | End: 2021-07-12

## 2021-07-02 RX ORDER — PREDNISONE 10 MG/1
TABLET ORAL
Qty: 30 TABLET | Refills: 0 | Status: SHIPPED | OUTPATIENT
Start: 2021-07-02 | End: 2021-08-06

## 2021-07-02 NOTE — TELEPHONE ENCOUNTER
CG--patient is on your schedule for 1130. He received 80 mg Depo-Medrol on 6/28/21. He was additional injection and doesn't feel that he should be charged another visit. Please advise.

## 2021-07-02 NOTE — TELEPHONE ENCOUNTER
----- Message from Christy Mackey sent at 7/2/2021  9:29 AM EDT -----  Subject: Message to Provider    QUESTIONS  Information for Provider? patient was in on Monday 6/28 for poison oak. shot given doesn't seem to be providing results he usually gets and wants   another shot. patient really wants a nurse visit vs. a doctor visit. Is   that possible? Leanne is checking w/ Dr. Patience Fernandez. Thnx!  ---------------------------------------------------------------------------  --------------  CALL BACK INFO  What is the best way for the office to contact you? OK to leave message on   voicemail  Preferred Call Back Phone Number? [phone]  ---------------------------------------------------------------------------  --------------  SCRIPT ANSWERS  Relationship to Patient?  Self

## 2021-07-02 NOTE — TELEPHONE ENCOUNTER
Ok to order w/o bringing patient in for another appt? He is agreeable to trying this. I also verified pharmacy info and have it pended. Will cancel appt after your response. CPB either way.

## 2021-07-02 NOTE — TELEPHONE ENCOUNTER
Rx EP'd to pharmacy. Please notify patient.       Requested Prescriptions     Signed Prescriptions Disp Refills    predniSONE (DELTASONE) 10 MG tablet 30 tablet 0     Sig: Take 4 po qd x 3 days, then 3 po qd x 3 days, then 2 po qd x 3 days, then 1 po qd x 3 days     Authorizing Provider: Collins Cheek    hydrOXYzine (ATARAX) 25 MG tablet 30 tablet 0     Sig: Take 1 tablet by mouth every 8 hours as needed for Itching     Authorizing Provider: Laurence Gitelman to cancel appt      Electronically signed by Diana Salgado MD on 7/2/2021 at 10:45 AM

## 2021-08-03 ENCOUNTER — OFFICE VISIT (OUTPATIENT)
Dept: FAMILY MEDICINE CLINIC | Age: 56
End: 2021-08-03
Payer: COMMERCIAL

## 2021-08-03 VITALS
WEIGHT: 239.4 LBS | SYSTOLIC BLOOD PRESSURE: 142 MMHG | HEART RATE: 84 BPM | HEIGHT: 69 IN | DIASTOLIC BLOOD PRESSURE: 80 MMHG | TEMPERATURE: 98.1 F | RESPIRATION RATE: 16 BRPM | BODY MASS INDEX: 35.46 KG/M2

## 2021-08-03 DIAGNOSIS — E78.00 PURE HYPERCHOLESTEROLEMIA: ICD-10-CM

## 2021-08-03 DIAGNOSIS — N40.0 BENIGN PROSTATIC HYPERPLASIA, UNSPECIFIED WHETHER LOWER URINARY TRACT SYMPTOMS PRESENT: ICD-10-CM

## 2021-08-03 DIAGNOSIS — F41.9 ANXIETY: ICD-10-CM

## 2021-08-03 DIAGNOSIS — M54.41 ACUTE RIGHT-SIDED LOW BACK PAIN WITH RIGHT-SIDED SCIATICA: Primary | ICD-10-CM

## 2021-08-03 DIAGNOSIS — M25.561 CHRONIC PAIN OF RIGHT KNEE: ICD-10-CM

## 2021-08-03 DIAGNOSIS — G89.29 CHRONIC PAIN OF RIGHT KNEE: ICD-10-CM

## 2021-08-03 PROCEDURE — 99214 OFFICE O/P EST MOD 30 MIN: CPT | Performed by: FAMILY MEDICINE

## 2021-08-03 PROCEDURE — 96372 THER/PROPH/DIAG INJ SC/IM: CPT | Performed by: FAMILY MEDICINE

## 2021-08-03 RX ORDER — ESCITALOPRAM OXALATE 10 MG/1
10 TABLET ORAL DAILY
Qty: 90 TABLET | Refills: 3 | Status: SHIPPED | OUTPATIENT
Start: 2021-08-03 | End: 2022-03-31 | Stop reason: SDUPTHER

## 2021-08-03 RX ORDER — SIMVASTATIN 40 MG
40 TABLET ORAL NIGHTLY
Qty: 90 TABLET | Refills: 3 | Status: SHIPPED | OUTPATIENT
Start: 2021-08-03 | End: 2022-03-31 | Stop reason: SDUPTHER

## 2021-08-03 RX ORDER — TAMSULOSIN HYDROCHLORIDE 0.4 MG/1
0.4 CAPSULE ORAL DAILY
Qty: 90 CAPSULE | Refills: 3 | Status: SHIPPED | OUTPATIENT
Start: 2021-08-03

## 2021-08-03 RX ORDER — METHYLPREDNISOLONE ACETATE 80 MG/ML
80 INJECTION, SUSPENSION INTRA-ARTICULAR; INTRALESIONAL; INTRAMUSCULAR; SOFT TISSUE ONCE
Status: COMPLETED | OUTPATIENT
Start: 2021-08-03 | End: 2021-08-03

## 2021-08-03 RX ORDER — TIZANIDINE 4 MG/1
4 TABLET ORAL 3 TIMES DAILY PRN
Qty: 30 TABLET | Refills: 0 | Status: SHIPPED | OUTPATIENT
Start: 2021-08-03 | End: 2022-03-31

## 2021-08-03 RX ADMIN — METHYLPREDNISOLONE ACETATE 80 MG: 80 INJECTION, SUSPENSION INTRA-ARTICULAR; INTRALESIONAL; INTRAMUSCULAR; SOFT TISSUE at 16:41

## 2021-08-03 SDOH — ECONOMIC STABILITY: FOOD INSECURITY: WITHIN THE PAST 12 MONTHS, THE FOOD YOU BOUGHT JUST DIDN'T LAST AND YOU DIDN'T HAVE MONEY TO GET MORE.: NEVER TRUE

## 2021-08-03 SDOH — ECONOMIC STABILITY: FOOD INSECURITY: WITHIN THE PAST 12 MONTHS, YOU WORRIED THAT YOUR FOOD WOULD RUN OUT BEFORE YOU GOT MONEY TO BUY MORE.: NEVER TRUE

## 2021-08-03 ASSESSMENT — ENCOUNTER SYMPTOMS
RESPIRATORY NEGATIVE: 1
BACK PAIN: 1
ABDOMINAL PAIN: 0

## 2021-08-03 ASSESSMENT — SOCIAL DETERMINANTS OF HEALTH (SDOH): HOW HARD IS IT FOR YOU TO PAY FOR THE VERY BASICS LIKE FOOD, HOUSING, MEDICAL CARE, AND HEATING?: NOT HARD AT ALL

## 2021-08-03 NOTE — PROGRESS NOTES
After obtaining consent, and per orders of Dr. Lora Sacks, injection of Depo-Medrol 80mg was given by Mahin Cunningham MA. Patient tolerated well.

## 2021-08-03 NOTE — PROGRESS NOTES
8/3/2021    Brad Dubois (:  1965) is a 63 y.o. male,Established patient, here for evaluation of the following chief complaint(s):  Back Pain (lower back pain, goes into right leg, painful not numbness or tingling, history of knee surgery, no injury )      ASSESSMENT/PLAN:    1. Acute right-sided low back pain with right-sided sciatica  -     methylPREDNISolone acetate (DEPO-MEDROL) injection 80 mg; 80 mg, Intramuscular, ONCE, On Tue 8/3/21 at 1700, For 1 dose  -     tiZANidine (ZANAFLEX) 4 MG tablet; Take 1 tablet by mouth 3 times daily as needed (back pain), Disp-30 tablet, R-0Normal  2. Pure hypercholesterolemia  -     simvastatin (ZOCOR) 40 MG tablet; Take 1 tablet by mouth nightly, Disp-90 tablet, R-3Normal  3. Anxiety  -     escitalopram (LEXAPRO) 10 MG tablet; Take 1 tablet by mouth daily, Disp-90 tablet, R-3Normal  4. Benign prostatic hyperplasia, unspecified whether lower urinary tract symptoms present  -     tamsulosin (FLOMAX) 0.4 MG capsule; Take 1 capsule by mouth daily, Disp-90 capsule, R-3Normal  5. Chronic pain of right knee      Depo-Medrol 80 mg IM today    Local heat and Zanaflex for muscle spasm    Once his back pain is resolved, will address his knee issues. He will likely need to see his orthopedist    Medication refills as above. He is encouraged to schedule his annual physical as soon as possible. SUBJECTIVE/OBJECTIVE:    HPI    Patient here today with 3-day history of right low back pain radiating into the right leg. Symptoms came on after lifting a trailer hitch. He has had muscle spasm and tightness. He has a history of similar symptoms in the past for which he received Depo-Medrol and Toradol in the ER. He states that this worked well for him at that time. He denies any bowel or bladder issues. No numbness or tingling. He feels like his back symptoms are slightly improved today compared to yesterday. He also complains of some chronic right knee pain.   He has a history of a meniscal tear which was repaired in the past.  He is wondering if he could have torn the meniscus again. No swelling. He also requests refills of chronic medications. Review of Systems   Constitutional: Negative for fatigue and fever. HENT: Negative. Respiratory: Negative. Cardiovascular: Negative for chest pain and leg swelling. Gastrointestinal: Negative for abdominal pain. Genitourinary: Negative for difficulty urinating, dysuria, flank pain and hematuria. Musculoskeletal: Positive for arthralgias (Right knee), back pain and gait problem. Neurological: Negative for weakness and numbness. All other systems reviewed and are negative. Vitals:    08/03/21 1546   BP: (!) 142/80   Pulse: 84   Resp: 16   Temp: 98.1 °F (36.7 °C)   Weight: 239 lb 6.4 oz (108.6 kg)   Height: 5' 9\" (1.753 m)       Wt Readings from Last 3 Encounters:   08/03/21 239 lb 6.4 oz (108.6 kg)   06/28/21 241 lb (109.3 kg)   05/11/21 252 lb 3.2 oz (114.4 kg)       BP Readings from Last 3 Encounters:   08/03/21 (!) 142/80   06/28/21 126/86   05/11/21 130/72       Physical Exam  Vitals reviewed. Constitutional:       General: He is not in acute distress. Appearance: He is well-developed. HENT:      Head: Normocephalic and atraumatic. Right Ear: Tympanic membrane normal.      Left Ear: Tympanic membrane normal.      Mouth/Throat:      Mouth: Mucous membranes are moist.      Pharynx: No posterior oropharyngeal erythema. Eyes:      Conjunctiva/sclera: Conjunctivae normal.   Cardiovascular:      Rate and Rhythm: Normal rate and regular rhythm. Heart sounds: No murmur heard. Pulmonary:      Breath sounds: Normal breath sounds. No wheezing. Musculoskeletal:      Lumbar back: Spasms present. No edema. Decreased range of motion. Positive right straight leg raise test. Negative left straight leg raise test.      Right knee: No swelling, deformity or effusion.  No LCL laxity, MCL laxity, ACL laxity or PCL laxity. Normal alignment, normal meniscus and normal patellar mobility. Right lower leg: No edema. Left lower leg: No edema. Lymphadenopathy:      Cervical: No cervical adenopathy. Neurological:      Mental Status: He is alert. An electronic signature was used to authenticate this note.         Electronically signed by Ysabel Wilson MD on 8/3/2021 at 5:46 PM

## 2021-08-06 ENCOUNTER — OFFICE VISIT (OUTPATIENT)
Dept: FAMILY MEDICINE CLINIC | Age: 56
End: 2021-08-06
Payer: COMMERCIAL

## 2021-08-06 VITALS
HEIGHT: 69 IN | BODY MASS INDEX: 35.4 KG/M2 | HEART RATE: 88 BPM | SYSTOLIC BLOOD PRESSURE: 120 MMHG | WEIGHT: 239 LBS | DIASTOLIC BLOOD PRESSURE: 62 MMHG

## 2021-08-06 DIAGNOSIS — M54.41 ACUTE RIGHT-SIDED LOW BACK PAIN WITH RIGHT-SIDED SCIATICA: ICD-10-CM

## 2021-08-06 DIAGNOSIS — Z00.00 ANNUAL PHYSICAL EXAM: Primary | ICD-10-CM

## 2021-08-06 PROCEDURE — 99396 PREV VISIT EST AGE 40-64: CPT | Performed by: FAMILY MEDICINE

## 2021-08-06 RX ORDER — HYDROCODONE BITARTRATE AND ACETAMINOPHEN 5; 325 MG/1; MG/1
1 TABLET ORAL EVERY 8 HOURS PRN
Qty: 15 TABLET | Refills: 0 | Status: SHIPPED | OUTPATIENT
Start: 2021-08-06 | End: 2021-08-19 | Stop reason: SDUPTHER

## 2021-08-06 ASSESSMENT — ENCOUNTER SYMPTOMS
EYES NEGATIVE: 1
CHEST TIGHTNESS: 0
BACK PAIN: 1
ABDOMINAL PAIN: 0
SHORTNESS OF BREATH: 0
BLOOD IN STOOL: 0

## 2021-08-06 NOTE — PROGRESS NOTES
2021    Imelda Bauman (:  1965) is a 63 y.o. male, here for a preventive medicine evaluation. Patient Active Problem List   Diagnosis    Low testosterone    HTN (hypertension)    Hyperlipidemia    Allergic rhinitis    Hemorrhoid    Obesity (BMI 30.0-34. 9)    Anxiety    KT (obstructive sleep apnea)     Stephaniemile Ningphilipp reports that he still struggling with low back pain that radiates down the right leg. He got no relief from a Depo-Medrol injection earlier this week. He is seeing a chiropractor who is working on ordering an MRI of his back. He is unable to sleep due to pain. Muscle relaxants have provided no relief. He requests a short-term prescription of pain medication. OARRS report reviewed and no contraindications or problems noted. His chronic conditions are stable. Blood pressures have been great. He takes all prescribed medications as directed and denies side effects. Denies chest pain or shortness of breath. No changes in family history. Non-smoker. BMI 34.80. Review of Systems   Constitutional: Negative for chills, fatigue, fever and unexpected weight change. HENT: Negative. Eyes: Negative. Respiratory: Negative for chest tightness and shortness of breath. Cardiovascular: Negative for chest pain, palpitations and leg swelling. Gastrointestinal: Negative for abdominal pain and blood in stool. Genitourinary: Negative for dysuria and hematuria. Musculoskeletal: Positive for back pain. Negative for joint swelling. Skin: Negative for rash. Neurological: Positive for numbness. Negative for dizziness. Psychiatric/Behavioral: Negative. All other systems reviewed and are negative. Prior to Visit Medications    Medication Sig Taking? Authorizing Provider   HYDROcodone-acetaminophen (NORCO) 5-325 MG per tablet Take 1 tablet by mouth every 8 hours as needed for Pain for up to 5 days.  Take lowest dose possible to manage pain Yes Zoraida Cassidy MD   simvastatin (ZOCOR) 40 MG tablet Take 1 tablet by mouth nightly Yes Jean Olea MD   escitalopram (LEXAPRO) 10 MG tablet Take 1 tablet by mouth daily Yes Jean Olea MD   tamsulosin (FLOMAX) 0.4 MG capsule Take 1 capsule by mouth daily Yes Jean Olea MD   tiZANidine (ZANAFLEX) 4 MG tablet Take 1 tablet by mouth 3 times daily as needed (back pain) Yes Jean Olea MD   testosterone cypionate (DEPOTESTOTERONE CYPIONATE) 200 MG/ML injection Inject 1 ml in to the muscle every 10 days Yes Navya Fitzpatrick MD   losartan-hydroCHLOROthiazide (HYZAAR) 100-12.5 MG per tablet TAKE 1 TABLET BY MOUTH EVERY DAY Yes BETTY Parker CNP   hydrocortisone 2.5 % cream Apply topically 2 times daily.  Yes BETTY Hunter CNP   furosemide (LASIX) 40 MG tablet Take 40 mg by mouth daily Yes Historical Provider, MD   Syringe/Needle, Disp, (SYRINGE 3CC/22GX1\") 22G X 1\" 3 ML MISC Used to inject Testosterone every 14 days Yes Carter Fort PierceBETTY - CNP   cetirizine (ZYRTEC) 10 MG tablet Take 1 tablet by mouth daily Yes Jared Melton APRN - CNP   Esomeprazole Magnesium (NEXIUM PO) Take by mouth as needed OTC  Yes Historical Provider, MD        Allergies   Allergen Reactions    Ace Inhibitors     Amoxicillin-Pot Clavulanate Nausea Only    Avelox [Moxifloxacin Hcl In Nacl] Nausea Only    Bactrim Nausea Only       Past Medical History:   Diagnosis Date    Allergic rhinitis     BPH (benign prostatic hyperplasia)     Hyperlipidemia     Hypertension     Hypogonadism 9/12/2011    Hypogonadism male     Inflammatory spondylopathies ([facility] [address]     Testosterone deficiency        Past Surgical History:   Procedure Laterality Date    BACK SURGERY  08/05/2018    CARPAL TUNNEL RELEASE Left 2015    CERVICAL SPINE SURGERY  06/01/2015    Herniated disc    COLONOSCOPY  2011    ELBOW SURGERY  8/11    Dr. Nuno Koroma  09/01/2020    LUMBAR FUSION  08/2017    L5-S1, Dr. Benjamin Dey 08/03/2020    Hepatitis C screen  06/23/2023 (Originally 1965)    HIV screen  06/23/2022 (Originally [birth year]16/1980)    Flu vaccine (1) 09/01/2021    Potassium monitoring  05/09/2022    Creatinine monitoring  05/09/2022    DTaP/Tdap/Td vaccine (2 - Td or Tdap) 07/01/2029    Hepatitis A vaccine  Aged Out    Hepatitis B vaccine  Aged Out    Hib vaccine  Aged Out    Meningococcal (ACWY) vaccine  Aged Out    Pneumococcal 0-64 years Vaccine  Aged Out          ASSESSMENT/PLAN:    1. Annual physical exam  -     Lipid Panel; Future  2. Acute right-sided low back pain with right-sided sciatica  -     HYDROcodone-acetaminophen (NORCO) 5-325 MG per tablet; Take 1 tablet by mouth every 8 hours as needed for Pain for up to 5 days. Take lowest dose possible to manage pain, Disp-15 tablet, R-0Print      Update lipid panel as above    Continue current medications    Follow-up with chiropractor for back as planned    Short-term prescription of Norco for severe back pain given    OARRS report reviewed and no contraindications or problems noted    Will check with his GI physician's office for his last colonoscopy report    Follow-up yearly and as needed       An electronic signature was used to authenticate this note.     --Marylou Mujica MD on 8/6/2021 at 1:22 PM

## 2021-08-19 ENCOUNTER — TELEPHONE (OUTPATIENT)
Dept: FAMILY MEDICINE CLINIC | Age: 56
End: 2021-08-19

## 2021-08-19 DIAGNOSIS — M54.41 ACUTE RIGHT-SIDED LOW BACK PAIN WITH RIGHT-SIDED SCIATICA: ICD-10-CM

## 2021-08-19 RX ORDER — HYDROCODONE BITARTRATE AND ACETAMINOPHEN 5; 325 MG/1; MG/1
1 TABLET ORAL EVERY 8 HOURS PRN
Qty: 21 TABLET | Refills: 0 | Status: SHIPPED | OUTPATIENT
Start: 2021-08-19 | End: 2021-08-26

## 2021-08-19 NOTE — TELEPHONE ENCOUNTER
Pt called in stating he had a MRI done at [facility] and they found he has a herniated disc. Pt was requesting if the provider would be able to prescribe him something for the pain until he goes for his appt he has on the 35th in [place].    Please advise, best call back number  [phone]

## 2021-08-19 NOTE — TELEPHONE ENCOUNTER
Chart reviewed. Short term scipt for [place] sent to pharmacy. TS    Controlled Substance Monitoring:    Acute and Chronic Pain Monitoring:   RX Monitoring 8/19/2021   Periodic Controlled Substance Monitoring No signs of potential drug abuse or diversion identified.

## 2021-09-14 ENCOUNTER — TELEPHONE (OUTPATIENT)
Dept: FAMILY MEDICINE CLINIC | Age: 56
End: 2021-09-14

## 2021-09-15 ENCOUNTER — OFFICE VISIT (OUTPATIENT)
Dept: FAMILY MEDICINE CLINIC | Age: 56
End: 2021-09-15
Payer: COMMERCIAL

## 2021-09-15 VITALS
SYSTOLIC BLOOD PRESSURE: 126 MMHG | TEMPERATURE: 98 F | DIASTOLIC BLOOD PRESSURE: 80 MMHG | WEIGHT: 243.2 LBS | HEART RATE: 112 BPM | BODY MASS INDEX: 35.41 KG/M2 | RESPIRATION RATE: 16 BRPM

## 2021-09-15 DIAGNOSIS — L91.8 SKIN TAG: ICD-10-CM

## 2021-09-15 DIAGNOSIS — L23.7 POISON IVY DERMATITIS: Primary | ICD-10-CM

## 2021-09-15 PROCEDURE — 99214 OFFICE O/P EST MOD 30 MIN: CPT | Performed by: FAMILY MEDICINE

## 2021-09-15 PROCEDURE — 96372 THER/PROPH/DIAG INJ SC/IM: CPT | Performed by: FAMILY MEDICINE

## 2021-09-15 RX ORDER — GABAPENTIN 300 MG/1
300 CAPSULE ORAL 3 TIMES DAILY
COMMUNITY
Start: 2021-08-30 | End: 2022-03-31

## 2021-09-15 RX ORDER — METHYLPREDNISOLONE ACETATE 80 MG/ML
80 INJECTION, SUSPENSION INTRA-ARTICULAR; INTRALESIONAL; INTRAMUSCULAR; SOFT TISSUE ONCE
Status: COMPLETED | OUTPATIENT
Start: 2021-09-15 | End: 2021-09-15

## 2021-09-15 RX ADMIN — METHYLPREDNISOLONE ACETATE 80 MG: 80 INJECTION, SUSPENSION INTRA-ARTICULAR; INTRALESIONAL; INTRAMUSCULAR; SOFT TISSUE at 11:51

## 2021-09-15 ASSESSMENT — ENCOUNTER SYMPTOMS
GASTROINTESTINAL NEGATIVE: 1
RESPIRATORY NEGATIVE: 1

## 2021-09-15 NOTE — PROGRESS NOTES
After obtaining consent, and per orders of Dr. Jayden Mclean, injection of Methylprednisolone 80 mg/ml 1 ml given IM right upper quadrant gluteus by NAT CRUZ CMA ([facility]). Patient tolerated well.
Normocephalic and atraumatic. Right Ear: Tympanic membrane normal.      Left Ear: Tympanic membrane normal.      Mouth/Throat:      Mouth: Mucous membranes are moist.      Pharynx: No posterior oropharyngeal erythema. Eyes:      Conjunctiva/sclera: Conjunctivae normal.   Cardiovascular:      Rate and Rhythm: Normal rate and regular rhythm. Heart sounds: No murmur heard. Pulmonary:      Breath sounds: Normal breath sounds. No wheezing. Musculoskeletal:      Right lower leg: No edema. Left lower leg: No edema. Lymphadenopathy:      Cervical: No cervical adenopathy. Skin:     Findings: Rash present. Neurological:      Mental Status: He is alert. An electronic signature was used to authenticate this note.         Electronically signed by Meek Holland MD on 9/15/2021 at 11:36 AM

## 2021-09-21 ENCOUNTER — TELEPHONE (OUTPATIENT)
Dept: FAMILY MEDICINE CLINIC | Age: 56
End: 2021-09-21

## 2021-09-21 RX ORDER — PREDNISONE 10 MG/1
TABLET ORAL
Qty: 30 TABLET | Refills: 0 | Status: SHIPPED | OUTPATIENT
Start: 2021-09-21 | End: 2022-03-31

## 2021-09-21 NOTE — TELEPHONE ENCOUNTER
----- Message from Gonzalo Blas sent at 9/21/2021 12:20 PM EDT -----  Subject: Message to Provider    QUESTIONS  Information for Provider? pt likes to know if he will be receiving the   shot or a pill for his poising oak.  ---------------------------------------------------------------------------  --------------  CALL BACK INFO  What is the best way for the office to contact you? OK to leave message on   voicemail  Preferred Call Back Phone Number? [phone]  ---------------------------------------------------------------------------  --------------  SCRIPT ANSWERS  Relationship to Patient?  Self

## 2021-09-21 NOTE — TELEPHONE ENCOUNTER
Rx EP'd to pharmacy. Please notify patient. Requested Prescriptions     Signed Prescriptions Disp Refills    predniSONE (DELTASONE) 10 MG tablet 30 tablet 0     Sig: Take 4 po qd x 3 days, then 3 po qd x 3 days, then 2 po qd x 3 days, then 1 po qd x 3 days     Authorizing Provider: Felipe Shown     He only had a very minimal rash when he was here the last time and his dose of DepoMedrol was more than adequate. Rx sent in for oral steroids. Cancel appt for tomorrow. HAVE HIM AVOID RE-EXPOSURE TO POISON IVY.         Electronically signed by Daiana Preston MD on 9/21/2021 at 12:55 PM

## 2021-10-12 ENCOUNTER — TELEPHONE (OUTPATIENT)
Dept: FAMILY MEDICINE CLINIC | Age: 56
End: 2021-10-12

## 2021-10-12 NOTE — TELEPHONE ENCOUNTER
Spoke to the pt and notified him that we have not received anything from Dr. Sajan Daniel office regarding needing an epidural. Pt will call his office and have them fax the information to the office.      Pt states the information will be faxed by Dr. Sajan Daniel office on Thursday.     (pt needs an epidural for his low back pain and doesn't want to drive to [place] to get it done, wants it done locally)

## 2021-10-27 NOTE — TELEPHONE ENCOUNTER
Spoke to pt and he stated he is having the epidural in [place]. Nothing further is needed from this office.

## 2022-01-11 ENCOUNTER — TELEPHONE (OUTPATIENT)
Dept: UROLOGY | Age: 57
End: 2022-01-11

## 2022-01-11 NOTE — TELEPHONE ENCOUNTER
Francoise Perez called and said that because of his back surgery, Dr. Jessica Zarco  Does not want him to take the Testosterone injections at this time. Will follow up after he has his next visit .

## 2022-03-30 ENCOUNTER — NURSE ONLY (OUTPATIENT)
Dept: LAB | Age: 57
End: 2022-03-30

## 2022-03-30 DIAGNOSIS — E34.9 TESTOSTERONE DEFICIENCY: ICD-10-CM

## 2022-03-30 LAB
ALBUMIN SERPL-MCNC: 4.2 G/DL (ref 3.5–5.1)
ALP BLD-CCNC: 98 U/L (ref 38–126)
ALT SERPL-CCNC: 33 U/L (ref 11–66)
AST SERPL-CCNC: 28 U/L (ref 5–40)
BILIRUB SERPL-MCNC: 0.4 MG/DL (ref 0.3–1.2)
BILIRUBIN DIRECT: < 0.2 MG/DL (ref 0–0.3)
ERYTHROCYTE [DISTWIDTH] IN BLOOD BY AUTOMATED COUNT: 17.8 % (ref 11.5–14.5)
ERYTHROCYTE [DISTWIDTH] IN BLOOD BY AUTOMATED COUNT: 51.9 FL (ref 35–45)
HCT VFR BLD CALC: 53.7 % (ref 42–52)
HEMOGLOBIN: 16.9 GM/DL (ref 14–18)
MCH RBC QN AUTO: 27 PG (ref 26–33)
MCHC RBC AUTO-ENTMCNC: 31.5 GM/DL (ref 32.2–35.5)
MCV RBC AUTO: 85.8 FL (ref 80–94)
PLATELET # BLD: 278 THOU/MM3 (ref 130–400)
PMV BLD AUTO: 10.3 FL (ref 9.4–12.4)
PROSTATE SPECIFIC ANTIGEN: 0.66 NG/ML (ref 0–1)
RBC # BLD: 6.26 MILL/MM3 (ref 4.7–6.1)
TOTAL PROTEIN: 6.7 G/DL (ref 6.1–8)
WBC # BLD: 7 THOU/MM3 (ref 4.8–10.8)

## 2022-03-31 ENCOUNTER — OFFICE VISIT (OUTPATIENT)
Dept: FAMILY MEDICINE CLINIC | Age: 57
End: 2022-03-31
Payer: COMMERCIAL

## 2022-03-31 VITALS
SYSTOLIC BLOOD PRESSURE: 136 MMHG | WEIGHT: 259.4 LBS | HEIGHT: 69 IN | DIASTOLIC BLOOD PRESSURE: 80 MMHG | TEMPERATURE: 97.7 F | RESPIRATION RATE: 16 BRPM | HEART RATE: 80 BPM | BODY MASS INDEX: 38.42 KG/M2

## 2022-03-31 DIAGNOSIS — R73.9 HYPERGLYCEMIA: ICD-10-CM

## 2022-03-31 DIAGNOSIS — I10 ESSENTIAL HYPERTENSION: ICD-10-CM

## 2022-03-31 DIAGNOSIS — Z00.00 ANNUAL PHYSICAL EXAM: Primary | ICD-10-CM

## 2022-03-31 DIAGNOSIS — E66.01 CLASS 2 SEVERE OBESITY DUE TO EXCESS CALORIES WITH SERIOUS COMORBIDITY AND BODY MASS INDEX (BMI) OF 37.0 TO 37.9 IN ADULT (HCC): ICD-10-CM

## 2022-03-31 DIAGNOSIS — E78.00 PURE HYPERCHOLESTEROLEMIA: ICD-10-CM

## 2022-03-31 DIAGNOSIS — F41.9 ANXIETY: ICD-10-CM

## 2022-03-31 PROBLEM — E66.9 OBESITY (BMI 30.0-34.9): Status: RESOLVED | Noted: 2017-06-23 | Resolved: 2022-03-31

## 2022-03-31 PROBLEM — E66.812 CLASS 2 SEVERE OBESITY DUE TO EXCESS CALORIES WITH SERIOUS COMORBIDITY AND BODY MASS INDEX (BMI) OF 37.0 TO 37.9 IN ADULT: Status: ACTIVE | Noted: 2022-03-31

## 2022-03-31 PROBLEM — E66.811 OBESITY (BMI 30.0-34.9): Status: RESOLVED | Noted: 2017-06-23 | Resolved: 2022-03-31

## 2022-03-31 PROCEDURE — 99396 PREV VISIT EST AGE 40-64: CPT | Performed by: FAMILY MEDICINE

## 2022-03-31 RX ORDER — SIMVASTATIN 40 MG
40 TABLET ORAL NIGHTLY
Qty: 90 TABLET | Refills: 3 | Status: SHIPPED | OUTPATIENT
Start: 2022-03-31

## 2022-03-31 RX ORDER — ESCITALOPRAM OXALATE 10 MG/1
10 TABLET ORAL DAILY
Qty: 90 TABLET | Refills: 3 | Status: SHIPPED | OUTPATIENT
Start: 2022-03-31

## 2022-03-31 RX ORDER — LOSARTAN POTASSIUM AND HYDROCHLOROTHIAZIDE 12.5; 1 MG/1; MG/1
TABLET ORAL
Qty: 90 TABLET | Refills: 3 | Status: SHIPPED | OUTPATIENT
Start: 2022-03-31

## 2022-03-31 ASSESSMENT — ENCOUNTER SYMPTOMS
BACK PAIN: 1
ABDOMINAL PAIN: 0
BLOOD IN STOOL: 0
SHORTNESS OF BREATH: 0
EYES NEGATIVE: 1
CHEST TIGHTNESS: 0

## 2022-03-31 NOTE — PATIENT INSTRUCTIONS
Patient Education        Well Visit, Men 48 to 72: Care Instructions  Overview     Well visits can help you stay healthy. Your doctor has checked your overall health and may have suggested ways to take good care of yourself. Your doctor also may have recommended tests. At home, you can help prevent illness withhealthy eating, regular exercise, and other steps. Follow-up care is a key part of your treatment and safety. Be sure to make and go to all appointments, and call your doctor if you are having problems. It's also a good idea to know your test results and keep alist of the medicines you take. How can you care for yourself at home?  Get screening tests that you and your doctor decide on. Screening helps find diseases before any symptoms appear.  Eat healthy foods. Choose fruits, vegetables, whole grains, protein, and low-fat dairy foods. Limit fat, especially saturated fat. Reduce salt in your diet.  Limit alcohol. Have no more than 2 drinks a day or 14 drinks a week.  Get at least 30 minutes of exercise on most days of the week. Walking is a good choice. You also may want to do other activities, such as running, swimming, cycling, or playing tennis or team sports.  Reach and stay at a healthy weight. This will lower your risk for many problems, such as obesity, diabetes, heart disease, and high blood pressure.  Do not smoke. Smoking can make health problems worse. If you need help quitting, talk to your doctor about stop-smoking programs and medicines. These can increase your chances of quitting for good.  Care for your mental health. It is easy to get weighed down by worry and stress. Learn strategies to manage stress, like deep breathing and mindfulness, and stay connected with your family and community. If you find you often feel sad or hopeless, talk with your doctor. Treatment can help.    Talk to your doctor about whether you have any risk factors for sexually transmitted infections (STIs). You can help prevent STIs if you wait to have sex with a new partner (or partners) until you've each been tested for STIs. It also helps if you use condoms (male or female condoms) and if you limit your sex partners to one person who only has sex with you. Vaccines are available for some STIs.  If it's important to you to prevent pregnancy with your partner, talk with your doctor about birth control options that might be best for you.  If you think you may have a problem with alcohol or drug use, talk to your doctor. This includes prescription medicines (such as amphetamines and opioids) and illegal drugs (such as cocaine and methamphetamine). Your doctor can help you figure out what type of treatment is best for you.  Protect your skin from too much sun. When you're outdoors from 10 a.m. to 4 p.m., stay in the shade or cover up with clothing and a hat with a wide brim. Wear sunglasses that block UV rays. Even when it's cloudy, put broad-spectrum sunscreen (SPF 30 or higher) on any exposed skin.  See a dentist one or two times a year for checkups and to have your teeth cleaned.  Wear a seat belt in the car. When should you call for help? Watch closely for changes in your health, and be sure to contact your doctor if you have any problems or symptoms that concern you. Where can you learn more? Go to https://[facility]. org and sign in to your [employer] account. Enter N012 in the [facility] box to learn more about \"Well Visit, Men 48 to 72: Care Instructions. \"     If you do not have an account, please click on the \"Sign Up Now\" link. Current as of: October 6, 2021               Content Version: 13.2  © 5418-2652 Healthwise, Incorporated. Care instructions adapted under license by [facility] ([facility]).  If you have questions about a medical condition or this instruction, always ask your healthcare professional. Kishore Meyers any warranty or liability for your use of this information.

## 2022-03-31 NOTE — PROGRESS NOTES
3/31/2022    Tasha Arriaza (:  1965) is a 61 y.o. male, here for a preventive medicine evaluation. Patient Active Problem List   Diagnosis    Low testosterone    HTN (hypertension)    Hyperlipidemia    Allergic rhinitis    Hemorrhoid    Anxiety    KT (obstructive sleep apnea)    Class 2 severe obesity due to excess calories with serious comorbidity and body mass index (BMI) of 37.0 to 37.9 in adult [facility])     Owen Medley reports that he is still recovering from his back surgery that he had in December. He has been somewhat less active and his weight is up. Despite this, his blood pressures have been stable. He follows up with his specialists regularly. Anxiety stable. He takes all prescribed medications as directed and denies side effects. No recent illnesses or hospitalizations. Last colonoscopy was in 2018 with . We will track down that report. He denies chest pain or shortness of breath. Non-smoker. BMI 37.99. Review of Systems   Constitutional: Negative for chills, fatigue, fever and unexpected weight change (gain). HENT: Negative. Eyes: Negative. Respiratory: Negative for chest tightness and shortness of breath. Cardiovascular: Negative for chest pain, palpitations and leg swelling. Gastrointestinal: Negative for abdominal pain and blood in stool. Genitourinary: Negative for difficulty urinating, dysuria and hematuria. Musculoskeletal: Positive for back pain. Negative for joint swelling. Skin: Negative for rash. Neurological: Negative for dizziness and headaches. Psychiatric/Behavioral: Negative. All other systems reviewed and are negative. Prior to Visit Medications    Medication Sig Taking?  Authorizing Provider   losartan-hydroCHLOROthiazide (HYZAAR) 100-12.5 MG per tablet TAKE 1 TABLET BY MOUTH EVERY DAY Yes Khadra Murdock MD   simvastatin (ZOCOR) 40 MG tablet Take 1 tablet by mouth nightly Yes Khadra Murdock MD escitalopram (LEXAPRO) 10 MG tablet Take 1 tablet by mouth daily Yes Fide Aguillon MD   tamsulosin (FLOMAX) 0.4 MG capsule Take 1 capsule by mouth daily Yes Fide Aguillon MD   testosterone cypionate (DEPOTESTOTERONE CYPIONATE) 200 MG/ML injection Inject 1 ml in to the muscle every 10 days Yes Chris Spann MD   furosemide (LASIX) 40 MG tablet Take 40 mg by mouth daily Yes Historical Provider, MD   Syringe/Needle, Disp, (SYRINGE 3CC/22GX1\") 22G X 1\" 3 ML MISC Used to inject Testosterone every 14 days Yes BETTY Hull CNP   cetirizine (ZYRTEC) 10 MG tablet Take 1 tablet by mouth daily Yes BETTY Guan CNP   Esomeprazole Magnesium (NEXIUM PO) Take by mouth as needed OTC  Yes Historical Provider, MD        Allergies   Allergen Reactions    Ace Inhibitors     Amoxicillin-Pot Clavulanate Nausea Only    Avelox [Moxifloxacin Hcl In Nacl] Nausea Only    Bactrim Nausea Only       Past Medical History:   Diagnosis Date    Allergic rhinitis     BPH (benign prostatic hyperplasia)     Hyperlipidemia     Hypertension     Hypogonadism 9/12/2011    Hypogonadism male     Inflammatory spondylopathies ([facility] [address]     Testosterone deficiency        Past Surgical History:   Procedure Laterality Date    BACK SURGERY  08/05/2018    CARPAL TUNNEL RELEASE Left 2015    CERVICAL SPINE SURGERY  06/01/2015    Herniated disc    COLONOSCOPY  2011    ELBOW SURGERY  8/11    Dr. Bean Sanchez [facility]   [facility]  09/01/2020    LUMBAR FUSION  08/2017    L5-S1, Dr. Ni Pap  12/17/2021    OTHER SURGICAL HISTORY  age 15    benign masses removed from chest    SINUS SURGERY         Social History     Socioeconomic History    Marital status:      Spouse name: Not on file    Number of children: Not on file    Years of education: Not on file    Highest education level: Not on file   Occupational History    Not on file   Tobacco Use    Smoking status: Never Smoker    Smokeless tobacco: Never Used   Vaping Use    Vaping Use: Never used   Substance and Sexual Activity    Alcohol use: Yes     Comment: social    Drug use: No    Sexual activity: Yes   Other Topics Concern    Not on file   Social History Narrative    Not on file     Social Determinants of Health     Financial Resource Strain: Low Risk     Difficulty of Paying Living Expenses: Not hard at all   Food Insecurity: No Food Insecurity    Worried About Running Out of Food in the Last Year: Never true    [address] in the Last Year: Never true   Transportation Needs:     Lack of Transportation (Medical): Not on file    Lack of Transportation (Non-Medical):  Not on file   Physical Activity:     Days of Exercise per Week: Not on file    Minutes of Exercise per Session: Not on file   Stress:     Feeling of Stress : Not on file   Social Connections:     Frequency of Communication with Friends and Family: Not on file    Frequency of Social Gatherings with Friends and Family: Not on file    Attends Hoahaoism Services: Not on file    Active Member of [address] [place] [address] or Organizations: Not on file    Attends Club or Organization Meetings: Not on file    Marital Status: Not on file   Intimate Partner Violence:     Fear of Current or Ex-Partner: Not on file    Emotionally Abused: Not on file    Physically Abused: Not on file    Sexually Abused: Not on file   Housing Stability:     Unable to Pay for Housing in the Last Year: Not on file    Number of [place] in the Last Year: Not on file    Unstable Housing in the Last Year: Not on file        Family History   Problem Relation Age of Onset    Cancer Father     High Blood Pressure Mother        ADVANCE DIRECTIVE: N, <no information>    Vitals:    03/31/22 1354   BP: 136/80   Pulse: 80   Resp: 16   Temp: 97.7 °F (36.5 °C)   TempSrc: Oral   Weight: 259 lb 6.4 oz (117.7 kg)   Height: 5' 9.29\" (1.76 m)     Estimated body mass index is 37.99 kg/m² as calculated from the following:    Height as of this encounter: 5' 9.29\" (1.76 m). Weight as of this encounter: 259 lb 6.4 oz (117.7 kg). Physical Exam  Vitals reviewed. Constitutional:       General: He is not in acute distress. Appearance: He is well-developed. HENT:      Head: Normocephalic and atraumatic. Right Ear: Tympanic membrane normal.      Left Ear: Tympanic membrane normal.      Mouth/Throat:      Mouth: Mucous membranes are moist.      Pharynx: No posterior oropharyngeal erythema. Eyes:      Conjunctiva/sclera: Conjunctivae normal.   Neck:      Thyroid: No thyromegaly. Vascular: No carotid bruit. Cardiovascular:      Rate and Rhythm: Normal rate and regular rhythm. Heart sounds: No murmur heard. Pulmonary:      Effort: Pulmonary effort is normal.      Breath sounds: Normal breath sounds. No wheezing. Abdominal:      General: Bowel sounds are normal.      Palpations: Abdomen is soft. Tenderness: There is no abdominal tenderness. Musculoskeletal:      Cervical back: Neck supple. Right lower leg: No edema. Left lower leg: No edema. Lymphadenopathy:      Cervical: No cervical adenopathy. Skin:     General: Skin is warm and dry. Findings: No rash. Neurological:      Mental Status: He is alert and oriented to person, place, and time.    Psychiatric:         Behavior: Behavior normal.       Lab Results   Component Value Date    PSA 0.66 03/30/2022    PSA 0.78 05/05/2021    PSA 0.61 11/02/2020     Lab Results   Component Value Date    WBC 7.0 03/30/2022    HGB 16.9 03/30/2022    HCT 53.7 (H) 03/30/2022    MCV 85.8 03/30/2022     03/30/2022     Lab Results   Component Value Date     07/29/2020    K 4.3 07/29/2020     07/29/2020    CO2 27 07/29/2020    BUN 16 07/29/2020    CREATININE 1.0 07/29/2020    GLUCOSE 117 07/29/2020    CALCIUM 9.0 07/29/2020          Immunization History   Administered Date(s) Administered    Influenza A (H5N1) Monovalent Vaccine, Adjuvanted-2013 12/06/2017    Influenza, MDCK Quadv, IM, PF (Flucelvax 2 yrs and older) 10/18/2020    Influenza, Quadv, IM, PF (6 mo and older Fluzone, Flulaval, Fluarix, and 3 yrs and older Afluria) 10/27/2021    Tdap (Boostrix, Adacel) 07/01/2019       Health Maintenance   Topic Date Due    COVID-19 Vaccine (1) Never done    Shingles Vaccine (1 of 2) Never done    A1C test (Diabetic or Prediabetic)  12/29/2018    Colorectal Cancer Screen  07/30/2020    Lipid screen  08/03/2020    Hepatitis C screen  06/23/20[birth year] (Originally 1965)    HIV screen  06/23/2022 (Originally 8/16/1980)    Potassium monitoring  05/09/2022    Creatinine monitoring  05/09/2022    Depression Screen  06/28/2022    DTaP/Tdap/Td vaccine (2 - Td or Tdap) 07/01/2029    Flu vaccine  Completed    Hepatitis A vaccine  Aged Out    Hepatitis B vaccine  Aged Out    Hib vaccine  Aged Out    Meningococcal (ACWY) vaccine  Aged Out    Pneumococcal 0-64 years Vaccine  Aged Out       Assessment & Plan     1. Annual physical exam  -     Basic Metabolic Panel; Future  -     Lipid Panel; Future  2. Pure hypercholesterolemia  -     simvastatin (ZOCOR) 40 MG tablet; Take 1 tablet by mouth nightly, Disp-90 tablet, R-3Normal  3. Essential hypertension  -     losartan-hydroCHLOROthiazide (HYZAAR) 100-12.5 MG per tablet; TAKE 1 TABLET BY MOUTH EVERY DAY, Disp-90 tablet, R-3Normal  4. Anxiety  -     escitalopram (LEXAPRO) 10 MG tablet; Take 1 tablet by mouth daily, Disp-90 tablet, R-3Normal  5. Hyperglycemia  -     Hemoglobin A1C; Future  6.  Class 2 severe obesity due to excess calories with serious comorbidity and body mass index (BMI) of 37.0 to 37.9 in adult [facility])    Continue current medications    Update labs as above    Medication refills as above    Shingrix suggested    Track down last colonoscopy report from 2018    Work on diet and exercise to reduce weight    Follow-up yearly and as needed    --Khadra Murdock MD

## 2022-04-01 ENCOUNTER — NURSE ONLY (OUTPATIENT)
Dept: LAB | Age: 57
End: 2022-04-01

## 2022-04-01 DIAGNOSIS — R73.9 HYPERGLYCEMIA: ICD-10-CM

## 2022-04-01 DIAGNOSIS — Z00.00 ANNUAL PHYSICAL EXAM: ICD-10-CM

## 2022-04-01 LAB
ANION GAP SERPL CALCULATED.3IONS-SCNC: 14 MEQ/L (ref 8–16)
AVERAGE GLUCOSE: 117 MG/DL (ref 70–126)
BUN BLDV-MCNC: 17 MG/DL (ref 7–22)
CALCIUM SERPL-MCNC: 9 MG/DL (ref 8.5–10.5)
CHLORIDE BLD-SCNC: 101 MEQ/L (ref 98–111)
CHOLESTEROL, TOTAL: 148 MG/DL (ref 100–199)
CO2: 26 MEQ/L (ref 23–33)
CREAT SERPL-MCNC: 1 MG/DL (ref 0.4–1.2)
GFR SERPL CREATININE-BSD FRML MDRD: 77 ML/MIN/1.73M2
GLUCOSE BLD-MCNC: 105 MG/DL (ref 70–108)
HBA1C MFR BLD: 5.9 % (ref 4.4–6.4)
HDLC SERPL-MCNC: 39 MG/DL
LDL CHOLESTEROL CALCULATED: 70 MG/DL
POTASSIUM SERPL-SCNC: 4.1 MEQ/L (ref 3.5–5.2)
SODIUM BLD-SCNC: 141 MEQ/L (ref 135–145)
TRIGL SERPL-MCNC: 196 MG/DL (ref 0–199)

## 2022-04-04 ENCOUNTER — OFFICE VISIT (OUTPATIENT)
Dept: UROLOGY | Age: 57
End: 2022-04-04
Payer: COMMERCIAL

## 2022-04-04 VITALS
DIASTOLIC BLOOD PRESSURE: 82 MMHG | SYSTOLIC BLOOD PRESSURE: 138 MMHG | BODY MASS INDEX: 38.57 KG/M2 | WEIGHT: 260.4 LBS | HEIGHT: 69 IN

## 2022-04-04 DIAGNOSIS — E34.9 TESTOSTERONE DEFICIENCY: Primary | ICD-10-CM

## 2022-04-04 DIAGNOSIS — N40.1 BENIGN LOCALIZED PROSTATIC HYPERPLASIA WITH LOWER URINARY TRACT SYMPTOMS (LUTS): ICD-10-CM

## 2022-04-04 LAB — TESTOSTERONE TOTAL: 786 NG/DL (ref 300–890)

## 2022-04-04 PROCEDURE — 99214 OFFICE O/P EST MOD 30 MIN: CPT | Performed by: UROLOGY

## 2022-04-04 NOTE — PROGRESS NOTES
Orlando Hart MD        [address] [place] [address] [phone]  Dept: [phone]  Dept Fax: [phone]: [address] [address] Urology Office Note      Patient:  Dow Frankel  YOB: 1965    The patient is a 61 y.o. male who presents today for evaluation of the following problems:   Chief Complaint   Patient presents with    Follow-up     testosterone deficiency, labs prior        HISTORY OF PRESENT ILLNESS:       hypogonadism  Stopped injections bc of back surgery  Testosterone every ten days prior  Here to discuss labwork- testosterone level normal but hematocrit. bph- stable. Doing well on flomax as needed        Summary of Previous Records:  Dow Frankel f/u today for Testosterone Deficiency. His most recent Testosterone level was 987 on 07/2019. This was obtained mid- cycle. He is currently on Testosterone injections 200 mg every 14 days. He does report improvement in symptoms of sex drive, energy, fatigue and erections. Denies any side effects to the injections, feels sluggish a few days before next injection. Has sleep apnea, well controlled with CPAP machine. Trend of PSA:  0.73 07/2019  0.68 07/15/17  0.37 07/2016  Currently takes Flomax PRN, has intermittent weak stream  Denies any issues with ED  Nevilledrich Reasoner comes in by himself . Hx is obtained from the patient and the medical record.       Requested/reviewed records from Jose Fierro MD office and/or outside physician/EMR    (Patient's old records have been requested, reviewed and pertinent findings summarized in today's note.)    Procedures Today: N/A    Last several PSA's:  Lab Results   Component Value Date    PSA 0.66 03/30/2022    PSA 0.78 05/05/2021    PSA 0.61 11/02/2020       Last total testosterone:  Lab Results   Component Value Date    TESTOSTERONE 786 03/30/2022       Urinalysis today:  No results found for this visit on 04/04/22. Last BUN and creatinine:  Lab Results   Component Value Date    BUN 17 04/01/2022     Lab Results   Component Value Date    CREATININE 1.0 04/01/2022       Imaging Reviewed during this Office Visit:   Jared Carver MD independently reviewed the images and verified the radiology reports from:    No results found.     PAST MEDICAL, FAMILY AND SOCIAL HISTORY:  Past Medical History:   Diagnosis Date    Allergic rhinitis     BPH (benign prostatic hyperplasia)     Hyperlipidemia     Hypertension     Hypogonadism 9/12/2011    Hypogonadism male     Inflammatory spondylopathies ([address]     Testosterone deficiency      Past Surgical History:   Procedure Laterality Date    BACK SURGERY  08/05/2018    CARPAL TUNNEL RELEASE Left 2015    CERVICAL SPINE SURGERY  06/01/2015    Herniated disc    COLONOSCOPY  2011    ELBOW SURGERY  8/11    Dr. Dg Montiel elbow   [place]  09/01/2020    LUMBAR FUSION  08/2017    L5-S1, Dr. Chaitanya Orourke  12/17/2021    OTHER SURGICAL HISTORY  age 15    benign masses removed from chest    SINUS SURGERY       Family History   Problem Relation Age of Onset    Cancer Father     High Blood Pressure Mother      Outpatient Medications Marked as Taking for the 4/4/22 encounter (Office Visit) with Isabel Guzman MD   Medication Sig Dispense Refill    losartan-hydroCHLOROthiazide (HYZAAR) 100-12.5 MG per tablet TAKE 1 TABLET BY MOUTH EVERY DAY 90 tablet 3    simvastatin (ZOCOR) 40 MG tablet Take 1 tablet by mouth nightly 90 tablet 3    escitalopram (LEXAPRO) 10 MG tablet Take 1 tablet by mouth daily 90 tablet 3    tamsulosin (FLOMAX) 0.4 MG capsule Take 1 capsule by mouth daily 90 capsule 3    testosterone cypionate (DEPOTESTOTERONE CYPIONATE) 200 MG/ML injection Inject 1 ml in to the muscle every 10 days 10 mL 3    furosemide (LASIX) 40 MG tablet Take 40 mg by mouth daily      Syringe/Needle, Disp, (SYRINGE 3CC/22GX1\") 22G X 1\" 3 ML MISC Used to inject Testosterone every 14 days 3 each 5    cetirizine (ZYRTEC) 10 MG tablet Take 1 tablet by mouth daily 30 tablet 0    Esomeprazole Magnesium (NEXIUM PO) Take by mouth as needed OTC          Ace inhibitors, Amoxicillin-pot clavulanate, Avelox [moxifloxacin hcl in nacl], and Bactrim  Social History     Tobacco Use   Smoking Status Never Smoker   Smokeless Tobacco Never Used      (If patient a smoker, smoking cessation counseling offered)   Social History     Substance and Sexual Activity   Alcohol Use Yes    Comment: social       REVIEW OF SYSTEMS:  Constitutional: negative  Eyes: negative  Respiratory: negative  Cardiovascular: negative  Gastrointestinal: negative  Genitourinary: see HPI  Musculoskeletal: negative  Skin: negative   Neurological: negative  Hematological/Lymphatic: negative  Psychological: negative      Physical Exam:    This a 61 y.o. male  Vitals:    04/04/22 1423   BP: 138/82     Body mass index is 38.13 kg/m². Constitutional: Patient in no acute distress;         Assessment and Plan        1. Testosterone deficiency    2. Benign localized prostatic hyperplasia with lower urinary tract symptoms (LUTS)               Plan:       BPH- takes flomax as needed when his flow is intermittent and it helps    Hypogonadism- hold off on TRT due to elevated hematocrit. Donate blood    Elevated hematocrit- donate blood. No TRT until confirmed low. Needs bloodwork to assess for TRT toxicity prior to follow up 6 weeks. Prescriptions Ordered:  No orders of the defined types were placed in this encounter. Orders Placed:  No orders of the defined types were placed in this encounter.            Zia Pettit MD

## 2022-04-08 ENCOUNTER — TELEPHONE (OUTPATIENT)
Dept: FAMILY MEDICINE CLINIC | Age: 57
End: 2022-04-08

## 2022-04-08 RX ORDER — CIPROFLOXACIN 500 MG/1
500 TABLET, FILM COATED ORAL 2 TIMES DAILY
Qty: 20 TABLET | Refills: 0 | Status: SHIPPED | OUTPATIENT
Start: 2022-04-08 | End: 2022-09-20 | Stop reason: SDUPTHER

## 2022-04-08 NOTE — TELEPHONE ENCOUNTER
Rx EP'd to pharmacy. Please notify patient.       Requested Prescriptions     Signed Prescriptions Disp Refills    ciprofloxacin (CIPRO) 500 MG tablet 20 tablet 0     Sig: Take 1 tablet by mouth 2 times daily for 10 days     Authorizing Provider: Ming Maldonado           Electronically signed by Yolis Scott MD on 4/8/2022 at 2:54 PM  \

## 2022-04-08 NOTE — TELEPHONE ENCOUNTER
----- Message from Stanley Mcknight sent at 4/8/2022 11:44 AM EDT -----  Subject: Message to Provider    QUESTIONS  Information for Provider? Pt is calling because the Prostitus bite came   back and would like the prescription called in again for him.  ---------------------------------------------------------------------------  --------------  CALL BACK INFO  What is the best way for the office to contact you? OK to leave message on   voicemail, OK to respond with electronic message via [employer] portal (only   for patients who have registered [employer] account)  Preferred Call Back Phone Number? [phone]  ---------------------------------------------------------------------------  --------------  SCRIPT ANSWERS  Relationship to Patient?  Self

## 2022-04-08 NOTE — TELEPHONE ENCOUNTER
I called and clarified the message. Pt states that Dr Shaheen Hewitt has treated him for prostatitis in the past with [facility]. He complains of inflammation around his groin area. No redness.  [facility]

## 2022-05-11 ENCOUNTER — NURSE ONLY (OUTPATIENT)
Dept: LAB | Age: 57
End: 2022-05-11

## 2022-05-11 DIAGNOSIS — E34.9 TESTOSTERONE DEFICIENCY: ICD-10-CM

## 2022-05-11 LAB
ALBUMIN SERPL-MCNC: 4.2 G/DL (ref 3.5–5.1)
ALP BLD-CCNC: 110 U/L (ref 38–126)
ALT SERPL-CCNC: 36 U/L (ref 11–66)
AST SERPL-CCNC: 22 U/L (ref 5–40)
BILIRUB SERPL-MCNC: 0.3 MG/DL (ref 0.3–1.2)
BILIRUBIN DIRECT: < 0.2 MG/DL (ref 0–0.3)
HCT VFR BLD CALC: 50.8 % (ref 42–52)
HEMOGLOBIN: 15.9 GM/DL (ref 14–18)
TOTAL PROTEIN: 6.5 G/DL (ref 6.1–8)

## 2022-05-12 LAB — PROSTATE SPECIFIC ANTIGEN: 0.36 NG/ML (ref 0–1)

## 2022-05-13 LAB — TESTOSTERONE TOTAL: 119 NG/DL (ref 300–890)

## 2022-05-17 ENCOUNTER — OFFICE VISIT (OUTPATIENT)
Dept: UROLOGY | Age: 57
End: 2022-05-17
Payer: COMMERCIAL

## 2022-05-17 VITALS
BODY MASS INDEX: 35.07 KG/M2 | HEIGHT: 70 IN | DIASTOLIC BLOOD PRESSURE: 74 MMHG | WEIGHT: 245 LBS | SYSTOLIC BLOOD PRESSURE: 128 MMHG

## 2022-05-17 DIAGNOSIS — N40.1 BENIGN LOCALIZED PROSTATIC HYPERPLASIA WITH LOWER URINARY TRACT SYMPTOMS (LUTS): ICD-10-CM

## 2022-05-17 DIAGNOSIS — R79.89 LOW TESTOSTERONE: Primary | ICD-10-CM

## 2022-05-17 DIAGNOSIS — E34.9 TESTOSTERONE DEFICIENCY: ICD-10-CM

## 2022-05-17 PROCEDURE — 99214 OFFICE O/P EST MOD 30 MIN: CPT | Performed by: UROLOGY

## 2022-05-17 RX ORDER — TESTOSTERONE CYPIONATE 200 MG/ML
200 INJECTION INTRAMUSCULAR
Qty: 6 ML | Refills: 0 | Status: SHIPPED | OUTPATIENT
Start: 2022-05-17 | End: 2022-08-15

## 2022-05-17 NOTE — PROGRESS NOTES
Melissa King MD        [address] [address]  Dept: [phone]  Dept Fax: [phone]: [address] [address] Urology Office Note -     Patient:  Aura Torres  YOB: 1965    The patient is a 61 y.o. male who presents today for evaluation of the following problems:   Chief Complaint   Patient presents with    Hypogonadism    Follow-up        HISTORY OF PRESENT ILLNESS:       hypogonadism  Stopped injections bc of back surgery  Testosterone every ten days prior  Here to discuss labwork- testosterone level normal but hematocrit continues to be > 50 but in normal range now. Has been taking testboost multivitamin    bph- stable. Doing well on flomax as needed        Summary of Previous Records:  Aura Torres f/u today for Testosterone Deficiency. His most recent Testosterone level was 987 on 07/2019. This was obtained mid- cycle. He is currently on Testosterone injections 200 mg every 14 days. He does report improvement in symptoms of sex drive, energy, fatigue and erections. Denies any side effects to the injections, feels sluggish a few days before next injection. Has sleep apnea, well controlled with CPAP machine. Trend of PSA:  0.73 07/2019  0.68 07/15/17  0.37 07/2016  Currently takes Flomax PRN, has intermittent weak stream  Denies any issues with ED  Ru Gear comes in by himself . Hx is obtained from the patient and the medical record.       Requested/reviewed records from Destinee Slater MD office and/or outside physician/EMR    (Patient's old records have been requested, reviewed and pertinent findings summarized in today's note.)    Procedures Today: N/A    Last several PSA's:  Lab Results   Component Value Date    PSA 0.36 05/11/2022    PSA 0.66 03/30/2022    PSA 0.78 05/05/2021       Last total testosterone:  Lab Results   Component Value Date    TESTOSTERONE 119 (L) 05/11/2022       Urinalysis today:  No results found for this visit on 05/17/22. Last BUN and creatinine:  Lab Results   Component Value Date    BUN 17 04/01/2022     Lab Results   Component Value Date    CREATININE 1.0 04/01/2022       Imaging Reviewed during this Office Visit:   Naa Moser MD independently reviewed the images and verified the radiology reports from:    No results found. PAST MEDICAL, FAMILY AND SOCIAL HISTORY:  Past Medical History:   Diagnosis Date    Allergic rhinitis     BPH (benign prostatic hyperplasia)     Hyperlipidemia     Hypertension     Hypogonadism 9/12/2011    Hypogonadism male     Inflammatory spondylopathies ([address]     Testosterone deficiency      Past Surgical History:   Procedure Laterality Date    BACK SURGERY  08/05/2018    CARPAL TUNNEL RELEASE Left 2015    CERVICAL SPINE SURGERY  06/01/2015    Herniated disc    COLONOSCOPY  2011    ELBOW SURGERY  8/11    Dr. Primitivo Antunez elbow   [place]  09/01/2020    LUMBAR FUSION  08/2017    L5-S1, Dr. Dann Davis  12/17/2021    OTHER SURGICAL HISTORY  age 15    benign masses removed from chest    SINUS SURGERY       Family History   Problem Relation Age of Onset    Cancer Father     High Blood Pressure Mother      Outpatient Medications Marked as Taking for the 5/17/22 encounter (Office Visit) with Manjit Akhtar MD   Medication Sig Dispense Refill    testosterone cypionate (DEPOTESTOTERONE CYPIONATE) 200 MG/ML injection Inject 1 mL into the muscle every 14 days for 90 days.  6 mL 0       Ace inhibitors, Amoxicillin-pot clavulanate, Avelox [moxifloxacin hcl in nacl], and Bactrim  Social History     Tobacco Use   Smoking Status Never Smoker   Smokeless Tobacco Never Used      (If patient a smoker, smoking cessation counseling offered)   Social History     Substance and Sexual Activity   Alcohol Use Yes    Comment: social       REVIEW OF SYSTEMS:  Constitutional: negative  Eyes: negative  Respiratory: negative  Cardiovascular: negative  Gastrointestinal: negative  Genitourinary: see HPI  Musculoskeletal: negative  Skin: negative   Neurological: negative  Hematological/Lymphatic: negative  Psychological: negative      Physical Exam:    This a 61 y.o. male  Vitals:    05/17/22 1518   BP: 128/74     Body mass index is 35.15 kg/m². Constitutional: Patient in no acute distress;         Assessment and Plan        1. Low testosterone    2. Testosterone deficiency    3. Benign localized prostatic hyperplasia with lower urinary tract symptoms (LUTS)               Plan:       BPH- takes flomax as needed when his flow is intermittent and it helps    Hypogonadism- restart TRT. Continue to donate blood. Reassess in three months    Elevated hematocrit- donate blood. Needs bloodwork to assess for TRT toxicity prior to follow up 3 months            Prescriptions Ordered:  Orders Placed This Encounter   Medications    testosterone cypionate (DEPOTESTOTERONE CYPIONATE) 200 MG/ML injection     Sig: Inject 1 mL into the muscle every 14 days for 90 days.      Dispense:  6 mL     Refill:  0      Orders Placed:  Orders Placed This Encounter   Procedures    Hepatic Function Panel     Standing Status:   Future     Standing Expiration Date:   11/17/2022    PSA, Prostatic Specific Antigen     Standing Status:   Future     Standing Expiration Date:   11/17/2022    Hemoglobin and Hematocrit     Standing Status:   Future     Standing Expiration Date:   11/17/2022    Testosterone     Standing Status:   Future     Standing Expiration Date:   11/17/2022            Lance Aly MD

## 2022-05-18 ENCOUNTER — TELEPHONE (OUTPATIENT)
Dept: UROLOGY | Age: 57
End: 2022-05-18

## 2022-05-18 NOTE — TELEPHONE ENCOUNTER
Prior Auth initiated for TESTOSTERONE CYPIONATE . PA sent to [facility]. Should receive response in 3-5 days.       APPROVED SEE SCAN

## 2022-08-22 ENCOUNTER — NURSE ONLY (OUTPATIENT)
Dept: LAB | Age: 57
End: 2022-08-22

## 2022-08-22 DIAGNOSIS — R79.89 LOW TESTOSTERONE: ICD-10-CM

## 2022-08-22 LAB
ALBUMIN SERPL-MCNC: 4.5 G/DL (ref 3.5–5.1)
ALP BLD-CCNC: 127 U/L (ref 38–126)
ALT SERPL-CCNC: 27 U/L (ref 11–66)
AST SERPL-CCNC: 24 U/L (ref 5–40)
BILIRUB SERPL-MCNC: 0.2 MG/DL (ref 0.3–1.2)
BILIRUBIN DIRECT: < 0.2 MG/DL (ref 0–0.3)
HCT VFR BLD CALC: 47.5 % (ref 42–52)
HEMOGLOBIN: 15.4 GM/DL (ref 14–18)
PROSTATE SPECIFIC ANTIGEN: 0.73 NG/ML (ref 0–1)
TOTAL PROTEIN: 6.5 G/DL (ref 6.1–8)

## 2022-08-23 ENCOUNTER — OFFICE VISIT (OUTPATIENT)
Dept: UROLOGY | Age: 57
End: 2022-08-23
Payer: COMMERCIAL

## 2022-08-23 VITALS — RESPIRATION RATE: 16 BRPM | HEIGHT: 69 IN | BODY MASS INDEX: 34.8 KG/M2 | WEIGHT: 235 LBS

## 2022-08-23 DIAGNOSIS — R79.89 LOW TESTOSTERONE: Primary | ICD-10-CM

## 2022-08-23 DIAGNOSIS — N40.1 BENIGN LOCALIZED PROSTATIC HYPERPLASIA WITH LOWER URINARY TRACT SYMPTOMS (LUTS): ICD-10-CM

## 2022-08-23 PROCEDURE — 99214 OFFICE O/P EST MOD 30 MIN: CPT | Performed by: UROLOGY

## 2022-08-23 RX ORDER — TESTOSTERONE CYPIONATE 200 MG/ML
200 INJECTION INTRAMUSCULAR
Qty: 12 ML | Refills: 0 | Status: SHIPPED | OUTPATIENT
Start: 2022-08-23 | End: 2023-02-19

## 2022-08-23 NOTE — PROGRESS NOTES
Vanessa Hernandez MD        [address] [facility] [id number]  Dept: [phone]  Dept Fax: [phone]: [address] [address] Urology Office Note -     Patient:  Nilda Espinoza  YOB: 1965    The patient is a 61 y.o. male who presents today for evaluation of the following problems:   Chief Complaint   Patient presents with    3 Month Follow-Up     Low t- labs done yesterday. They are all back except the testosterone. Pt on testosterone. last inj was 8/13/22. HISTORY OF PRESENT ILLNESS:       hypogonadism  Stopped injections bc of back surgery  Testosterone every 14 days  Here to discuss labwork-  Has been taking testboost multivitamin    bph- stable. Doing well on flomax as needed        Summary of Previous Records:  Nilda Espinoza f/u today for Testosterone Deficiency. His most recent Testosterone level was 987 on 07/2019. This was obtained mid- cycle. He is currently on Testosterone injections 200 mg every 14 days. He does report improvement in symptoms of sex drive, energy, fatigue and erections. Denies any side effects to the injections, feels sluggish a few days before next injection. Has sleep apnea, well controlled with CPAP machine. Trend of PSA:  0.73 07/2019  0.68 07/15/17  0.37 07/2016  Currently takes Flomax PRN, has intermittent weak stream  Denies any issues with ED  Linda Mayra comes in by himself . Hx is obtained from the patient and the medical record.       Requested/reviewed records from Enoch Main MD office and/or outside physician/EMR    (Patient's old records have been requested, reviewed and pertinent findings summarized in today's note.)    Procedures Today: N/A    Last several PSA's:  Lab Results   Component Value Date    PSA 0.73 08/22/2022    PSA 0.36 05/11/2022    PSA 0.66 03/30/2022       Last total testosterone:  Lab Results   Component Value Date TESTOSTERONE 119 (L) 05/11/2022       Urinalysis today:  No results found for this visit on 08/23/22. Last BUN and creatinine:  Lab Results   Component Value Date    BUN 17 04/01/2022     Lab Results   Component Value Date    CREATININE 1.0 04/01/2022       Imaging Reviewed during this Office Visit:   Lobo Lindo MD independently reviewed the images and verified the radiology reports from:    No results found.     PAST MEDICAL, FAMILY AND SOCIAL HISTORY:  Past Medical History:   Diagnosis Date    Allergic rhinitis     BPH (benign prostatic hyperplasia)     Hyperlipidemia     Hypertension     Hypogonadism 9/12/2011    Hypogonadism male     Inflammatory spondylopathies ([address]     Testosterone deficiency      Past Surgical History:   Procedure Laterality Date    BACK SURGERY  08/05/2018    CARPAL TUNNEL RELEASE Left 2015    CERVICAL SPINE SURGERY  06/01/2015    Herniated disc    COLONOSCOPY  2011    ELBOW SURGERY  8/11    Dr. Chuck Acharya elbow    [facility]  09/01/2020    LUMBAR FUSION  08/2017    L5-S1, Dr. Johnny Oneal  12/17/2021    OTHER SURGICAL HISTORY  age 15    benign masses removed from chest    SINUS SURGERY       Family History   Problem Relation Age of Onset    Cancer Father     High Blood Pressure Mother      Outpatient Medications Marked as Taking for the 8/23/22 encounter (Office Visit) with Natalia Griffin MD   Medication Sig Dispense Refill    losartan-hydroCHLOROthiazide (HYZAAR) 100-12.5 MG per tablet TAKE 1 TABLET BY MOUTH EVERY DAY 90 tablet 3    simvastatin (ZOCOR) 40 MG tablet Take 1 tablet by mouth nightly 90 tablet 3    escitalopram (LEXAPRO) 10 MG tablet Take 1 tablet by mouth daily 90 tablet 3    furosemide (LASIX) 40 MG tablet Take 40 mg by mouth daily      Syringe/Needle, Disp, (SYRINGE 3CC/22GX1\") 22G X 1\" 3 ML MISC Used to inject Testosterone every 14 days 3 each 5    cetirizine (ZYRTEC) 10 MG tablet Take 1 tablet by mouth daily 30 tablet 0    Esomeprazole Magnesium (NEXIUM PO) Take by mouth as needed OTC          Ace inhibitors, Amoxicillin-pot clavulanate, Avelox [moxifloxacin hcl in nacl], and Bactrim  Social History     Tobacco Use   Smoking Status Never   Smokeless Tobacco Never      (If patient a smoker, smoking cessation counseling offered)   Social History     Substance and Sexual Activity   Alcohol Use Yes    Comment: social       REVIEW OF SYSTEMS:  Constitutional: negative  Eyes: negative  Respiratory: negative  Cardiovascular: negative  Gastrointestinal: negative  Genitourinary: see HPI  Musculoskeletal: negative  Skin: negative   Neurological: negative  Hematological/Lymphatic: negative  Psychological: negative      Physical Exam:    This a 61 y.o. male  Vitals:    08/23/22 1200   Resp: 16     Body mass index is 34.7 kg/m². Constitutional: Patient in no acute distress;         Assessment and Plan        1. Low testosterone    2. Benign localized prostatic hyperplasia with lower urinary tract symptoms (LUTS)               Plan:       BPH- takes flomax as needed when his flow is intermittent and it helps    Hypogonadism- every 14 days regimen to cont. Testosterone pending. Continue to donate blood. Reassess in three months    Elevated hematocrit- donate blood. Last hct normal    Needs bloodwork to assess for TRT toxicity prior to follow up 6 months            Prescriptions Ordered:  No orders of the defined types were placed in this encounter. Orders Placed:  No orders of the defined types were placed in this encounter.            Meme Barboza MD

## 2022-08-25 LAB — TESTOSTERONE TOTAL: 92 NG/DL (ref 300–890)

## 2022-09-20 ENCOUNTER — TELEPHONE (OUTPATIENT)
Dept: FAMILY MEDICINE CLINIC | Age: 57
End: 2022-09-20

## 2022-09-20 RX ORDER — CIPROFLOXACIN 500 MG/1
500 TABLET, FILM COATED ORAL 2 TIMES DAILY
Qty: 20 TABLET | Refills: 0 | Status: SHIPPED | OUTPATIENT
Start: 2022-09-20 | End: 2022-09-30

## 2022-09-20 NOTE — TELEPHONE ENCOUNTER
Rx EP'd to pharmacy. Please notify patient. Requested Prescriptions     Signed Prescriptions Disp Refills    ciprofloxacin (CIPRO) 500 MG tablet 20 tablet 0     Sig: Take 1 tablet by mouth 2 times daily for 10 days     Authorizing Provider: Magnolia Wheatley       Let him know that in the future we will likely need to see him for issues such as these. We occasionally call in medications without an appointment or evaluation but we try to keep that to a minimum so that we can ensure that we're providing good care.       Electronically signed by Estephania Ham MD on 9/20/2022 at 2:53 PM

## 2022-09-20 NOTE — TELEPHONE ENCOUNTER
Patient states that he has a h/o prostatitis that you have treated in the past with Cipro. States that you have done this w/o having him come in for appt. (See phone encounter dated 4/8/22)  Would like to see if you would be willing to do again. States that over the past few days, he has been experiencing difficulty with his stream. This seems to be a common symptom for him with the prostatitis. Please advise. I did let pt know that he may need an appt in office as we have not seen for 6 months. Otherwise, pharmacy info entered. Allergies verified. Call back either way.

## 2022-12-15 ENCOUNTER — OFFICE VISIT (OUTPATIENT)
Dept: FAMILY MEDICINE CLINIC | Age: 57
End: 2022-12-15
Payer: COMMERCIAL

## 2022-12-15 VITALS
WEIGHT: 251 LBS | SYSTOLIC BLOOD PRESSURE: 130 MMHG | TEMPERATURE: 98.5 F | BODY MASS INDEX: 37.07 KG/M2 | HEART RATE: 80 BPM | DIASTOLIC BLOOD PRESSURE: 68 MMHG | RESPIRATION RATE: 16 BRPM

## 2022-12-15 DIAGNOSIS — B96.89 ACUTE BACTERIAL SINUSITIS: Primary | ICD-10-CM

## 2022-12-15 DIAGNOSIS — R05.9 COUGH, UNSPECIFIED TYPE: ICD-10-CM

## 2022-12-15 DIAGNOSIS — J01.90 ACUTE BACTERIAL SINUSITIS: Primary | ICD-10-CM

## 2022-12-15 PROCEDURE — 99213 OFFICE O/P EST LOW 20 MIN: CPT | Performed by: NURSE PRACTITIONER

## 2022-12-15 PROCEDURE — 3074F SYST BP LT 130 MM HG: CPT | Performed by: NURSE PRACTITIONER

## 2022-12-15 PROCEDURE — 3078F DIAST BP <80 MM HG: CPT | Performed by: NURSE PRACTITIONER

## 2022-12-15 RX ORDER — PREDNISONE 20 MG/1
20 TABLET ORAL 2 TIMES DAILY
Qty: 10 TABLET | Refills: 0 | Status: SHIPPED | OUTPATIENT
Start: 2022-12-15 | End: 2022-12-20

## 2022-12-15 RX ORDER — DEXTROMETHORPHAN HYDROBROMIDE AND PROMETHAZINE HYDROCHLORIDE 15; 6.25 MG/5ML; MG/5ML
5 SYRUP ORAL 4 TIMES DAILY PRN
Qty: 118 ML | Refills: 0 | Status: SHIPPED | OUTPATIENT
Start: 2022-12-15 | End: 2022-12-22

## 2022-12-15 RX ORDER — CEFDINIR 300 MG/1
300 CAPSULE ORAL 2 TIMES DAILY
Qty: 20 CAPSULE | Refills: 0 | Status: SHIPPED | OUTPATIENT
Start: 2022-12-15 | End: 2022-12-25

## 2022-12-15 SDOH — ECONOMIC STABILITY: FOOD INSECURITY: WITHIN THE PAST 12 MONTHS, YOU WORRIED THAT YOUR FOOD WOULD RUN OUT BEFORE YOU GOT MONEY TO BUY MORE.: PATIENT DECLINED

## 2022-12-15 SDOH — ECONOMIC STABILITY: FOOD INSECURITY: WITHIN THE PAST 12 MONTHS, THE FOOD YOU BOUGHT JUST DIDN'T LAST AND YOU DIDN'T HAVE MONEY TO GET MORE.: PATIENT DECLINED

## 2022-12-15 ASSESSMENT — ENCOUNTER SYMPTOMS
SHORTNESS OF BREATH: 0
ABDOMINAL PAIN: 0
COUGH: 1
CHEST TIGHTNESS: 0
BLOOD IN STOOL: 0
EYE REDNESS: 1

## 2022-12-15 ASSESSMENT — PATIENT HEALTH QUESTIONNAIRE - PHQ9
1. LITTLE INTEREST OR PLEASURE IN DOING THINGS: 0
SUM OF ALL RESPONSES TO PHQ QUESTIONS 1-9: 0
2. FEELING DOWN, DEPRESSED OR HOPELESS: 0
SUM OF ALL RESPONSES TO PHQ9 QUESTIONS 1 & 2: 0
SUM OF ALL RESPONSES TO PHQ QUESTIONS 1-9: 0

## 2022-12-15 ASSESSMENT — SOCIAL DETERMINANTS OF HEALTH (SDOH): HOW HARD IS IT FOR YOU TO PAY FOR THE VERY BASICS LIKE FOOD, HOUSING, MEDICAL CARE, AND HEATING?: PATIENT DECLINED

## 2022-12-15 NOTE — PROGRESS NOTES
Chief Complaint   Patient presents with    Cough     Cough, chest/sinus congestion x 6 days. Vomits from coughing sometimes. Lossie Kocher is a 61 y.o.male      Pt complains of cough, chest and nasal congestion starting last Friday. Eyes are red but denies discharge or pain. Denies fever, sob, wheezing, or body aches currently. Has tried nyquil, advil, and mucinex without any relief. Review of Systems   Constitutional:  Positive for activity change, appetite change and fatigue. Negative for chills, fever and unexpected weight change. HENT:  Positive for congestion. Eyes:  Positive for redness. Respiratory:  Positive for cough. Negative for chest tightness and shortness of breath. Cardiovascular:  Negative for chest pain, palpitations and leg swelling. Gastrointestinal:  Negative for abdominal pain and blood in stool. Genitourinary:  Negative for dysuria. Musculoskeletal:  Negative for joint swelling. Skin:  Negative for rash. Neurological:  Negative for dizziness. Psychiatric/Behavioral: Negative. All other systems reviewed and are negative. OBJECTIVE     /68 (Site: Right Upper Arm)   Pulse 80   Temp 98.5 °F (36.9 °C) (Oral)   Resp 16   Wt 251 lb (113.9 kg)   BMI 37.07 kg/m²     Physical Exam  Vitals and nursing note reviewed. Constitutional:       General: He is not in acute distress. Appearance: He is well-developed. He is ill-appearing. He is not toxic-appearing. HENT:      Head: Normocephalic and atraumatic. Right Ear: External ear normal.      Left Ear: External ear normal.      Nose: Nose normal.      Mouth/Throat:      Pharynx: Posterior oropharyngeal erythema (mild) present. Eyes:      Conjunctiva/sclera: Conjunctivae normal.      Pupils: Pupils are equal, round, and reactive to light. Cardiovascular:      Rate and Rhythm: Normal rate and regular rhythm.    Pulmonary:      Effort: Pulmonary effort is normal. Breath sounds: Normal breath sounds. Abdominal:      General: Bowel sounds are normal.      Palpations: Abdomen is soft. Musculoskeletal:         General: Normal range of motion. Cervical back: Normal range of motion and neck supple. Skin:     General: Skin is warm and dry. Neurological:      Mental Status: He is alert and oriented to person, place, and time. Deep Tendon Reflexes: Reflexes are normal and symmetric. Psychiatric:         Behavior: Behavior normal.         Thought Content: Thought content normal.         Judgment: Judgment normal.         No results found for this visit on 12/15/22. ASSESSMENT       Diagnosis Orders   1. Acute bacterial sinusitis  predniSONE (DELTASONE) 20 MG tablet    cefdinir (OMNICEF) 300 MG capsule      2.  Cough, unspecified type  predniSONE (DELTASONE) 20 MG tablet    cefdinir (OMNICEF) 300 MG capsule    promethazine-dextromethorphan (PROMETHAZINE-DM) 6.25-15 MG/5ML syrup          PLAN     Requested Prescriptions     Signed Prescriptions Disp Refills    predniSONE (DELTASONE) 20 MG tablet 10 tablet 0     Sig: Take 1 tablet by mouth 2 times daily for 5 days    cefdinir (OMNICEF) 300 MG capsule 20 capsule 0     Sig: Take 1 capsule by mouth 2 times daily for 10 days    promethazine-dextromethorphan (PROMETHAZINE-DM) 6.25-15 MG/5ML syrup 118 mL 0     Sig: Take 5 mLs by mouth 4 times daily as needed for Cough     Meds as above  Increase fluids and rest  RTO if symptoms worsen or stay the same          Electronically signed by BETTY Crabtree CNP on 12/15/2022 at 11:39 AM

## 2022-12-19 ENCOUNTER — TELEPHONE (OUTPATIENT)
Dept: FAMILY MEDICINE CLINIC | Age: 57
End: 2022-12-19

## 2022-12-19 DIAGNOSIS — R05.1 ACUTE COUGH: Primary | ICD-10-CM

## 2022-12-19 RX ORDER — BROMPHENIRAMINE MALEATE, PSEUDOEPHEDRINE HYDROCHLORIDE, AND DEXTROMETHORPHAN HYDROBROMIDE 2; 30; 10 MG/5ML; MG/5ML; MG/5ML
5 SYRUP ORAL 4 TIMES DAILY PRN
Qty: 118 ML | Refills: 0 | Status: SHIPPED | OUTPATIENT
Start: 2022-12-19 | End: 2022-12-22

## 2022-12-19 RX ORDER — PROMETHAZINE HYDROCHLORIDE AND CODEINE PHOSPHATE 6.25; 1 MG/5ML; MG/5ML
5 SYRUP ORAL 4 TIMES DAILY PRN
Qty: 118 ML | Refills: 0 | Status: SHIPPED | OUTPATIENT
Start: 2022-12-19 | End: 2022-12-19 | Stop reason: RX

## 2022-12-19 NOTE — TELEPHONE ENCOUNTER
Called [facility] and the pharmacist states that Phenergan with Codeine is no longer manufactured so will need to prescribe something else. He does have Bromfed DM in stock. Please advise.

## 2022-12-19 NOTE — TELEPHONE ENCOUNTER
----- Message from Sami Kwan sent at 12/19/2022 10:56 AM EST -----  Subject: Message to Provider    QUESTIONS  Information for Provider? Patient stated that he was just seen last week   on 12/15 and his cough is not getting any better. Patient would like to   know if we could call him in something different. Patient stated he uses   [facility] [address]. Please contact the patient and advise.   ---------------------------------------------------------------------------  --------------  Lynne GRANT  [phone]; OK to leave message on voicemail  ---------------------------------------------------------------------------  --------------  SCRIPT ANSWERS  Relationship to Patient?  Self

## 2022-12-19 NOTE — TELEPHONE ENCOUNTER
Spoke to pt and he is taking Prednisone, Omnicef and has finished the Promethazine-DM (didn't help at all) and his cough is not any better, drinking harsh to try and get some relief. No fever. Pt wants to know what else he can do/take for the cough. Feeling good except for the cough. Please advise.

## 2022-12-22 ENCOUNTER — TELEPHONE (OUTPATIENT)
Dept: FAMILY MEDICINE CLINIC | Age: 57
End: 2022-12-22

## 2022-12-22 DIAGNOSIS — R05.9 COUGH, UNSPECIFIED TYPE: Primary | ICD-10-CM

## 2022-12-22 RX ORDER — PREDNISONE 10 MG/1
10 TABLET ORAL 2 TIMES DAILY
Qty: 10 TABLET | Refills: 0 | Status: SHIPPED | OUTPATIENT
Start: 2022-12-22 | End: 2022-12-27

## 2022-12-22 RX ORDER — HYDROCODONE POLISTIREX AND CHLORPHENIRAMINE POLISTIREX 10; 8 MG/5ML; MG/5ML
5 SUSPENSION, EXTENDED RELEASE ORAL EVERY 12 HOURS PRN
Qty: 60 ML | Refills: 0 | Status: SHIPPED | OUTPATIENT
Start: 2022-12-22 | End: 2022-12-28

## 2022-12-22 RX ORDER — DOXYCYCLINE HYCLATE 100 MG
100 TABLET ORAL 2 TIMES DAILY
Qty: 20 TABLET | Refills: 0 | Status: SHIPPED | OUTPATIENT
Start: 2022-12-22 | End: 2023-01-01

## 2022-12-22 NOTE — TELEPHONE ENCOUNTER
Patient has been on cefdinir several times in the past, unsure if this is the cause of his rash but would go ahead and stop it. Is there any reason he waited a week to start the antibiotic? I will send in a different one along with a different cough medicine. Please have him keep us updated on the swelling, but I will send in a few additional days of prednisone to help with this.  TS

## 2022-12-22 NOTE — TELEPHONE ENCOUNTER
Pt called in from [facility] ([facility]) stating that he started having some swelling in his face and hands that started this morning along with itchy skin. Pt believes that it could be the cefdinir that is causing the symptoms. Pt took 2 Benadryl pills this morning to help with the itching and it has gone away, but he does not see an improvement in the swelling quite yet. Pt has stopped the antibiotic, his last dose was yesterday morning with his other daily morning medications. Pt also believes that the brompheniramine syrup is not working either. Pt is wondering if something could be called in to help with the cough because once he starts coughing it is hard for him to stop. If a different prescription is sent in, he would like it sent to [facility] in [facility]. Please advise. Pt would like a call back with an update at [phone].

## 2023-02-14 ENCOUNTER — OFFICE VISIT (OUTPATIENT)
Dept: FAMILY MEDICINE CLINIC | Age: 58
End: 2023-02-14
Payer: COMMERCIAL

## 2023-02-14 VITALS
TEMPERATURE: 98.3 F | HEART RATE: 100 BPM | HEIGHT: 69 IN | WEIGHT: 263 LBS | RESPIRATION RATE: 16 BRPM | BODY MASS INDEX: 38.95 KG/M2

## 2023-02-14 DIAGNOSIS — B96.89 ACUTE BACTERIAL SINUSITIS: Primary | ICD-10-CM

## 2023-02-14 DIAGNOSIS — J20.9 BRONCHITIS WITH BRONCHOSPASM: ICD-10-CM

## 2023-02-14 DIAGNOSIS — J01.90 ACUTE BACTERIAL SINUSITIS: Primary | ICD-10-CM

## 2023-02-14 PROCEDURE — 99213 OFFICE O/P EST LOW 20 MIN: CPT | Performed by: NURSE PRACTITIONER

## 2023-02-14 RX ORDER — AZITHROMYCIN 250 MG/1
250 TABLET, FILM COATED ORAL SEE ADMIN INSTRUCTIONS
Qty: 6 TABLET | Refills: 0 | Status: SHIPPED | OUTPATIENT
Start: 2023-02-14 | End: 2023-02-19

## 2023-02-14 RX ORDER — HYDROCODONE POLISTIREX AND CHLORPHENIRAMINE POLISTIREX 10; 8 MG/5ML; MG/5ML
5 SUSPENSION, EXTENDED RELEASE ORAL EVERY 12 HOURS PRN
Qty: 60 ML | Refills: 0 | Status: SHIPPED | OUTPATIENT
Start: 2023-02-14 | End: 2023-02-20

## 2023-02-14 SDOH — ECONOMIC STABILITY: HOUSING INSECURITY
IN THE LAST 12 MONTHS, WAS THERE A TIME WHEN YOU DID NOT HAVE A STEADY PLACE TO SLEEP OR SLEPT IN A SHELTER (INCLUDING NOW)?: NO

## 2023-02-14 SDOH — ECONOMIC STABILITY: INCOME INSECURITY: HOW HARD IS IT FOR YOU TO PAY FOR THE VERY BASICS LIKE FOOD, HOUSING, MEDICAL CARE, AND HEATING?: NOT HARD AT ALL

## 2023-02-14 SDOH — ECONOMIC STABILITY: FOOD INSECURITY: WITHIN THE PAST 12 MONTHS, THE FOOD YOU BOUGHT JUST DIDN'T LAST AND YOU DIDN'T HAVE MONEY TO GET MORE.: NEVER TRUE

## 2023-02-14 SDOH — ECONOMIC STABILITY: FOOD INSECURITY: WITHIN THE PAST 12 MONTHS, YOU WORRIED THAT YOUR FOOD WOULD RUN OUT BEFORE YOU GOT MONEY TO BUY MORE.: NEVER TRUE

## 2023-02-14 ASSESSMENT — ENCOUNTER SYMPTOMS
RHINORRHEA: 1
SWOLLEN GLANDS: 0
COUGH: 1
SINUS PRESSURE: 1
HEARTBURN: 0
SHORTNESS OF BREATH: 0
HEMOPTYSIS: 0
SORE THROAT: 0
HOARSE VOICE: 0

## 2023-02-14 ASSESSMENT — PATIENT HEALTH QUESTIONNAIRE - PHQ9
SUM OF ALL RESPONSES TO PHQ QUESTIONS 1-9: 0
1. LITTLE INTEREST OR PLEASURE IN DOING THINGS: 0
SUM OF ALL RESPONSES TO PHQ9 QUESTIONS 1 & 2: 0
SUM OF ALL RESPONSES TO PHQ QUESTIONS 1-9: 0
2. FEELING DOWN, DEPRESSED OR HOPELESS: 0
SUM OF ALL RESPONSES TO PHQ QUESTIONS 1-9: 0
SUM OF ALL RESPONSES TO PHQ QUESTIONS 1-9: 0

## 2023-02-14 NOTE — PROGRESS NOTES
[address] [facility] [address]  Dept: [phone]  Dept Fax: [phone]: [phone]     Visit Date:  2/14/2023      Patient:  Edgar Rich  YOB: 1965    HPI:     Chief Complaint   Patient presents with    Cough     Cough and congestion x 1 week,  cough is not clearing     Facial Pain     X 1 month        Pt presents to the office today for congestion and sinus pain for the past month. Over the past week he has been having cough and congestion as well. No fever or chills. Cough  This is a new problem. Episode onset: 7-10 days. The cough is Productive of sputum. Associated symptoms include ear pain, headaches, nasal congestion and rhinorrhea. Pertinent negatives include no chest pain, chills, ear congestion, fever, heartburn, hemoptysis, myalgias, postnasal drip, rash, sore throat, shortness of breath, sweats or weight loss. The symptoms are aggravated by lying down. He has tried body position changes, cool air, rest and OTC cough suppressant for the symptoms. The treatment provided mild relief. His past medical history is significant for environmental allergies. There is no history of asthma, bronchiectasis, bronchitis, emphysema or pneumonia. Sinusitis  This is a recurrent problem. The current episode started more than 1 month ago. The problem has been gradually worsening since onset. There has been no fever. Associated symptoms include congestion, coughing, ear pain, headaches and sinus pressure. Pertinent negatives include no chills, diaphoresis, hoarse voice, neck pain, shortness of breath, sore throat or swollen glands. Past treatments include oral decongestants and acetaminophen. The treatment provided mild relief.      Medications    Current Outpatient Medications:     azithromycin (ZITHROMAX) 250 MG tablet, Take 1 tablet by mouth See Admin Instructions for 5 days 500mg on day 1 followed by 250mg on days 2 - 5, Disp: 6 tablet, Rfl: 0    HYDROcodone-chlorpheniramine (TUSSIONEX PENNKINETIC ER) 10-8 MG/5ML SUER, Take 5 mLs by mouth every 12 hours as needed (cough) for up to 6 days. Max Daily Amount: 10 mLs, Disp: 60 mL, Rfl: 0    testosterone cypionate (DEPOTESTOTERONE CYPIONATE) 200 MG/ML injection, Inject 1 mL into the muscle every 14 days for 180 days. , Disp: 12 mL, Rfl: 0    losartan-hydroCHLOROthiazide (HYZAAR) 100-12.5 MG per tablet, TAKE 1 TABLET BY MOUTH EVERY DAY, Disp: 90 tablet, Rfl: 3    simvastatin (ZOCOR) 40 MG tablet, Take 1 tablet by mouth nightly, Disp: 90 tablet, Rfl: 3    escitalopram (LEXAPRO) 10 MG tablet, Take 1 tablet by mouth daily, Disp: 90 tablet, Rfl: 3    tamsulosin (FLOMAX) 0.4 MG capsule, Take 1 capsule by mouth daily, Disp: 90 capsule, Rfl: 3    furosemide (LASIX) 40 MG tablet, Take 40 mg by mouth daily, Disp: , Rfl:     Syringe/Needle, Disp, (SYRINGE 3CC/22GX1\") 22G X 1\" 3 ML MISC, Used to inject Testosterone every 14 days, Disp: 3 each, Rfl: 5    cetirizine (ZYRTEC) 10 MG tablet, Take 1 tablet by mouth daily, Disp: 30 tablet, Rfl: 0    Esomeprazole Magnesium (NEXIUM PO), Take by mouth as needed OTC , Disp: , Rfl:     The patient is allergic to ace inhibitors, amoxicillin-pot clavulanate, avelox [moxifloxacin hcl in nacl], and bactrim. Past Medical History  Ailin Yarbrough  has a past medical history of Allergic rhinitis, BPH (benign prostatic hyperplasia), Hyperlipidemia, Hypertension, Hypogonadism, Hypogonadism male, Inflammatory spondylopathies ([address] and Testosterone deficiency. Subjective:      Review of Systems   Constitutional:  Negative for chills, diaphoresis, fever and weight loss. HENT:  Positive for congestion, ear pain, rhinorrhea and sinus pressure. Negative for hoarse voice, postnasal drip and sore throat. Respiratory:  Positive for cough. Negative for hemoptysis and shortness of breath. Cardiovascular:  Negative for chest pain.    Gastrointestinal: Negative for heartburn. Musculoskeletal:  Negative for myalgias and neck pain. Skin:  Negative for rash. Allergic/Immunologic: Positive for environmental allergies. Neurological:  Positive for headaches. Objective:     Pulse 100   Temp 98.3 °F (36.8 °C) (Oral)   Resp 16   Ht 5' 9\" (1.753 m)   Wt 263 lb (119.3 kg)   BMI 38.84 kg/m²     Physical Exam  Vitals reviewed. Constitutional:       General: He is not in acute distress. Appearance: Normal appearance. He is well-developed. HENT:      Head: Normocephalic and atraumatic. Right Ear: Hearing, tympanic membrane, ear canal and external ear normal.      Left Ear: Hearing, tympanic membrane, ear canal and external ear normal.      Nose: Mucosal edema, congestion and rhinorrhea present. No nasal tenderness. Right Sinus: Maxillary sinus tenderness and frontal sinus tenderness present. Left Sinus: Maxillary sinus tenderness and frontal sinus tenderness present. Mouth/Throat:      Lips: Pink. Mouth: Mucous membranes are moist. No oral lesions. Pharynx: Oropharynx is clear. Uvula midline. Posterior oropharyngeal erythema present. Eyes:      General:         Right eye: No discharge. Left eye: No discharge. Conjunctiva/sclera: Conjunctivae normal.   Neck:      Trachea: No tracheal deviation. Cardiovascular:      Rate and Rhythm: Normal rate and regular rhythm. Heart sounds: Normal heart sounds. No murmur heard. Pulmonary:      Effort: Pulmonary effort is normal. No respiratory distress. Breath sounds: Normal breath sounds. No wheezing. Comments: Congested cough    Abdominal:      General: Bowel sounds are normal.      Palpations: Abdomen is soft. Tenderness: There is no abdominal tenderness. Musculoskeletal:      Cervical back: Full passive range of motion without pain and neck supple.    Lymphadenopathy:      Head:      Right side of head: No submental, submandibular, tonsillar, preauricular, posterior auricular or occipital adenopathy. Left side of head: No submental, submandibular, tonsillar, preauricular, posterior auricular or occipital adenopathy. Cervical: No cervical adenopathy. Skin:     General: Skin is warm and dry. Findings: No rash. Neurological:      General: No focal deficit present. Mental Status: He is alert and oriented to person, place, and time. Coordination: Coordination normal.   Psychiatric:         Mood and Affect: Mood normal.         Behavior: Behavior normal.         Thought Content: Thought content normal.         Judgment: Judgment normal.       Assessment/Plan:      Danii Sheikh was seen today for cough and facial pain. Diagnoses and all orders for this visit:    Acute bacterial sinusitis  -     azithromycin (ZITHROMAX) 250 MG tablet; Take 1 tablet by mouth See Admin Instructions for 5 days 500mg on day 1 followed by 250mg on days 2 - 5    Bronchitis with bronchospasm  -     azithromycin (ZITHROMAX) 250 MG tablet; Take 1 tablet by mouth See Admin Instructions for 5 days 500mg on day 1 followed by 250mg on days 2 - 5  -     HYDROcodone-chlorpheniramine (TUSSIONEX PENNKINETIC ER) 10-8 MG/5ML SUER; Take 5 mLs by mouth every 12 hours as needed (cough) for up to 6 days. Max Daily Amount: 10 mLs    Controlled Substance Monitoring:    Acute and Chronic Pain Monitoring:   RX Monitoring 2/14/2023   Periodic Controlled Substance Monitoring No signs of potential drug abuse or diversion identified. ;Possible medication side effects, risk of tolerance/dependence & alternative treatments discussed. ;Assessed functional status. ;Obtaining appropriate analgesic effect of treatment.     - Rest and increase fluids  - Tylenol as needed for pain and fever  - OK to use tussionex as needed for cough.    - Good handwashing and clean all surfaces touched  - Call office with any questions or concerns, or if symptoms are getting worse or changing  - ER for any chest pain or SOB      Return if symptoms worsen or fail to improve. Patient given educational materials - see patient instructions. Discussed use, benefit, and side effects of prescribed medications. All patient questions answered. Pt voiced understanding.         Electronically signed by BETTY Boudreaux CNP on 2/14/2023 at 4:13 PM

## 2023-02-22 ENCOUNTER — TELEPHONE (OUTPATIENT)
Dept: FAMILY MEDICINE CLINIC | Age: 58
End: 2023-02-22

## 2023-02-22 RX ORDER — LORATADINE AND PSEUDOEPHEDRINE SULFATE 5; 120 MG/1; MG/1
1 TABLET, EXTENDED RELEASE ORAL 2 TIMES DAILY
Qty: 20 TABLET | Refills: 0 | Status: SHIPPED | OUTPATIENT
Start: 2023-02-22

## 2023-02-22 RX ORDER — DEXTROMETHORPHAN HYDROBROMIDE AND PROMETHAZINE HYDROCHLORIDE 15; 6.25 MG/5ML; MG/5ML
5 SYRUP ORAL 4 TIMES DAILY PRN
Qty: 118 ML | Refills: 0 | Status: SHIPPED | OUTPATIENT
Start: 2023-02-22 | End: 2023-03-01

## 2023-02-22 NOTE — TELEPHONE ENCOUNTER
----- Message from [address] sent at 2/22/2023 10:05 AM EST -----  Subject: Message to Provider    QUESTIONS  Information for Provider? Patient was seen on 2/14 for cough and   congestion but says it is not getting better. He was given a zpak. He   would like something else called in for the cough/congestion.   ---------------------------------------------------------------------------  --------------  Hua GRANT  [phone]; OK to leave message on voicemail  ---------------------------------------------------------------------------  --------------  SCRIPT ANSWERS  Relationship to Patient?  Self

## 2023-02-22 NOTE — TELEPHONE ENCOUNTER
Spoke to pt and his symptoms are overall better but cough is still there. Using Coricidin HBP and \"Equate brand nighttime pill for congestion\" and those aren't helping. [facility] and [facility]. Please advise.

## 2023-02-22 NOTE — TELEPHONE ENCOUNTER
Noted. Sent RX for Claritin-D very 12 hours as needed for congestion and Promethazine-DM as needed for cough.  Call office with any questions or concerns, or if symptoms are getting worse or changing. -WS    Orders Placed This Encounter   Medications    loratadine-pseudoephedrine (CLARITIN-D 12 HOUR) 5-120 MG per extended release tablet     Sig: Take 1 tablet by mouth 2 times daily     Dispense:  20 tablet     Refill:  0    promethazine-dextromethorphan (PROMETHAZINE-DM) 6.25-15 MG/5ML syrup     Sig: Take 5 mLs by mouth 4 times daily as needed for Cough     Dispense:  118 mL     Refill:  0 no

## 2023-03-01 ENCOUNTER — NURSE ONLY (OUTPATIENT)
Dept: LAB | Age: 58
End: 2023-03-01

## 2023-03-01 DIAGNOSIS — R79.89 LOW TESTOSTERONE: ICD-10-CM

## 2023-03-01 LAB
ALBUMIN SERPL BCG-MCNC: 4 G/DL (ref 3.5–5.1)
ALP SERPL-CCNC: 98 U/L (ref 38–126)
ALT SERPL W/O P-5'-P-CCNC: 28 U/L (ref 11–66)
AST SERPL-CCNC: 25 U/L (ref 5–40)
BILIRUB CONJ SERPL-MCNC: < 0.2 MG/DL (ref 0–0.3)
BILIRUB SERPL-MCNC: 0.3 MG/DL (ref 0.3–1.2)
HCT VFR BLD AUTO: 48.9 % (ref 42–52)
HGB BLD-MCNC: 15.5 GM/DL (ref 14–18)
PROT SERPL-MCNC: 6.3 G/DL (ref 6.1–8)
PSA SERPL-MCNC: 1.08 NG/ML (ref 0–1)

## 2023-03-04 LAB — TESTOST SERPL-MCNC: 735 NG/DL (ref 300–890)

## 2023-03-07 ENCOUNTER — TELEPHONE (OUTPATIENT)
Dept: FAMILY MEDICINE CLINIC | Age: 58
End: 2023-03-07

## 2023-03-07 ENCOUNTER — NURSE ONLY (OUTPATIENT)
Dept: FAMILY MEDICINE CLINIC | Age: 58
End: 2023-03-07
Payer: COMMERCIAL

## 2023-03-07 DIAGNOSIS — R05.3 CHRONIC COUGH: Primary | ICD-10-CM

## 2023-03-07 PROCEDURE — 96372 THER/PROPH/DIAG INJ SC/IM: CPT | Performed by: NURSE PRACTITIONER

## 2023-03-07 RX ORDER — METHYLPREDNISOLONE ACETATE 80 MG/ML
80 INJECTION, SUSPENSION INTRA-ARTICULAR; INTRALESIONAL; INTRAMUSCULAR; SOFT TISSUE ONCE
Status: COMPLETED | OUTPATIENT
Start: 2023-03-07 | End: 2023-03-07

## 2023-03-07 RX ADMIN — METHYLPREDNISOLONE ACETATE 80 MG: 80 INJECTION, SUSPENSION INTRA-ARTICULAR; INTRALESIONAL; INTRAMUSCULAR; SOFT TISSUE at 13:57

## 2023-03-07 NOTE — TELEPHONE ENCOUNTER
Patient seen in office on 2/14/23 and treated with Zpak and Tussionex for sinusitis and bronchitis. States that while on the ATB, cough seemed to improve some. Once completed, cough came back and has worsened since. States that Tussionex did nothing for him. Has also tried using Coricidin with no benefit. Denies wheezing, cough is non-productive. States that he is coughing constantly and would like to know if he can come in for \"a steroid shot\". Please advise. Allergies verified. [facility].

## 2023-03-07 NOTE — PROGRESS NOTES
After obtaining consent, and per orders of Nida Stern CNP, injection of Depo-Medrol 80 mg was given by Kyle Quintana CMA. Patient tolerated well.

## 2023-03-09 ENCOUNTER — OFFICE VISIT (OUTPATIENT)
Dept: UROLOGY | Age: 58
End: 2023-03-09
Payer: COMMERCIAL

## 2023-03-09 VITALS
HEIGHT: 69 IN | SYSTOLIC BLOOD PRESSURE: 112 MMHG | BODY MASS INDEX: 37.77 KG/M2 | DIASTOLIC BLOOD PRESSURE: 64 MMHG | WEIGHT: 255 LBS

## 2023-03-09 DIAGNOSIS — N40.1 BENIGN LOCALIZED PROSTATIC HYPERPLASIA WITH LOWER URINARY TRACT SYMPTOMS (LUTS): ICD-10-CM

## 2023-03-09 DIAGNOSIS — E34.9 TESTOSTERONE DEFICIENCY: ICD-10-CM

## 2023-03-09 DIAGNOSIS — R79.89 LOW TESTOSTERONE: Primary | ICD-10-CM

## 2023-03-09 PROCEDURE — 3078F DIAST BP <80 MM HG: CPT | Performed by: UROLOGY

## 2023-03-09 PROCEDURE — 3074F SYST BP LT 130 MM HG: CPT | Performed by: UROLOGY

## 2023-03-09 PROCEDURE — 99214 OFFICE O/P EST MOD 30 MIN: CPT | Performed by: UROLOGY

## 2023-03-09 RX ORDER — TESTOSTERONE CYPIONATE 200 MG/ML
200 INJECTION INTRAMUSCULAR
Qty: 12 ML | Refills: 0 | Status: SHIPPED | OUTPATIENT
Start: 2023-03-09 | End: 2023-09-05

## 2023-03-09 NOTE — PROGRESS NOTES
Cornell Chavez MD        [address] [place] [id number]  Dept: [phone]  Dept Fax: [phone]: [address] [address] Urology Office Note -     Patient:  Edgar Snow  YOB: 1965    The patient is a 61 y.o. male who presents today for evaluation of the following problems:   Chief Complaint   Patient presents with    Other     Low testosterone. Needs bloodwork to assess for TRT toxicity prior to follow up 6 months. Labs done prior. HISTORY OF PRESENT ILLNESS:       hypogonadism  Testosterone every 14 days  Here to discuss labwork-slight psa bump  Does donate blood    bph- stable. Doing well on flomax as needed        Summary of Previous Records:  Edgar Snow f/u today for Testosterone Deficiency. His most recent Testosterone level was 987 on 07/2019. This was obtained mid- cycle. He is currently on Testosterone injections 200 mg every 14 days. He does report improvement in symptoms of sex drive, energy, fatigue and erections. Denies any side effects to the injections, feels sluggish a few days before next injection. Has sleep apnea, well controlled with CPAP machine. Trend of PSA:  0.73 07/2019  0.68 07/15/17  0.37 07/2016  Currently takes Flomax PRN, has intermittent weak stream  Denies any issues with ED  Rohini Jeronimo comes in by himself . Hx is obtained from the patient and the medical record.       Requested/reviewed records from Natalia Danielle MD office and/or outside physician/EMR    (Patient's old records have been requested, reviewed and pertinent findings summarized in today's note.)    Procedures Today: N/A    Last several PSA's:  Lab Results   Component Value Date    PSA 1.08 (H) 03/01/2023    PSA 0.73 08/22/2022    PSA 0.36 05/11/2022       Last total testosterone:  Lab Results   Component Value Date    TESTOSTERONE 735 03/01/2023       Urinalysis today:  No results found for this visit on 03/09/23. Last BUN and creatinine:  Lab Results   Component Value Date    BUN 17 04/01/2022     Lab Results   Component Value Date    CREATININE 1.0 04/01/2022       Imaging Reviewed during this Office Visit:   Amanda Pedroza MD independently reviewed the images and verified the radiology reports from:    No results found.     PAST MEDICAL, FAMILY AND SOCIAL HISTORY:  Past Medical History:   Diagnosis Date    Allergic rhinitis     BPH (benign prostatic hyperplasia)     Hyperlipidemia     Hypertension     Hypogonadism 9/12/2011    Hypogonadism male     Inflammatory spondylopathies ([address]     Testosterone deficiency      Past Surgical History:   Procedure Laterality Date    BACK SURGERY  08/05/2018    CARPAL TUNNEL RELEASE Left 2015    CERVICAL SPINE SURGERY  06/01/2015    Herniated disc    COLONOSCOPY  2011    ELBOW SURGERY  8/11    Dr. Naa Flores elbow    [place]  09/01/2020    LUMBAR FUSION  08/2017    L5-S1, Dr. Jero Padilla  12/17/2021    OTHER SURGICAL HISTORY  age 15    benign masses removed from chest    SINUS SURGERY       Family History   Problem Relation Age of Onset    Cancer Father     High Blood Pressure Mother      Outpatient Medications Marked as Taking for the 3/9/23 encounter (Office Visit) with Gary Raygoza MD   Medication Sig Dispense Refill    loratadine-pseudoephedrine (CLARITIN-D 12 HOUR) 5-120 MG per extended release tablet Take 1 tablet by mouth 2 times daily 20 tablet 0    losartan-hydroCHLOROthiazide (HYZAAR) 100-12.5 MG per tablet TAKE 1 TABLET BY MOUTH EVERY DAY 90 tablet 3    simvastatin (ZOCOR) 40 MG tablet Take 1 tablet by mouth nightly 90 tablet 3    escitalopram (LEXAPRO) 10 MG tablet Take 1 tablet by mouth daily 90 tablet 3    tamsulosin (FLOMAX) 0.4 MG capsule Take 1 capsule by mouth daily 90 capsule 3    furosemide (LASIX) 40 MG tablet Take 40 mg by mouth daily      Syringe/Needle, Disp, (SYRINGE 3CC/22GX1\") 22G X 1\" 3 ML MISC Used to inject Testosterone every 14 days 3 each 5    cetirizine (ZYRTEC) 10 MG tablet Take 1 tablet by mouth daily 30 tablet 0    Esomeprazole Magnesium (NEXIUM PO) Take by mouth as needed OTC          Ace inhibitors, Amoxicillin-pot clavulanate, Avelox [moxifloxacin hcl in nacl], and Bactrim  Social History     Tobacco Use   Smoking Status Never   Smokeless Tobacco Never      (If patient a smoker, smoking cessation counseling offered)   Social History     Substance and Sexual Activity   Alcohol Use Yes    Comment: social       REVIEW OF SYSTEMS:  Constitutional: negative  Eyes: negative  Respiratory: negative  Cardiovascular: negative  Gastrointestinal: negative  Genitourinary: see HPI  Musculoskeletal: negative  Skin: negative   Neurological: negative  Hematological/Lymphatic: negative  Psychological: negative      Physical Exam:    This a [id number] y.o. male  Vitals:    03/09/23 1426   BP: 112/64     Body mass index is 37.66 kg/m². Constitutional: Patient in no acute distress;         Assessment and Plan        1. Low testosterone    2. Benign localized prostatic hyperplasia with lower urinary tract symptoms (LUTS)    3. Testosterone deficiency               Plan:       BPH- takes flomax as needed when his flow is intermittent and it helps    Hypogonadism- every 14 days regimen to cont. Testosterone pending. Continue to donate blood. Reassess in three months    Elevated hematocrit- donate blood. Last hct normal    Needs bloodwork to assess for TRT toxicity prior to follow up 6 months            Prescriptions Ordered:  No orders of the defined types were placed in this encounter. Orders Placed:  No orders of the defined types were placed in this encounter.            Sue Gomez MD

## 2023-04-18 DIAGNOSIS — R73.9 HYPERGLYCEMIA: ICD-10-CM

## 2023-04-18 DIAGNOSIS — E78.00 PURE HYPERCHOLESTEROLEMIA: ICD-10-CM

## 2023-04-18 DIAGNOSIS — F41.9 ANXIETY: ICD-10-CM

## 2023-04-18 DIAGNOSIS — Z00.00 ANNUAL PHYSICAL EXAM: Primary | ICD-10-CM

## 2023-04-18 RX ORDER — ESCITALOPRAM OXALATE 10 MG/1
TABLET ORAL
Qty: 30 TABLET | Refills: 0 | Status: SHIPPED | OUTPATIENT
Start: 2023-04-18

## 2023-04-18 RX ORDER — SIMVASTATIN 40 MG
40 TABLET ORAL NIGHTLY
Qty: 30 TABLET | Refills: 0 | Status: SHIPPED | OUTPATIENT
Start: 2023-04-18

## 2023-04-18 NOTE — TELEPHONE ENCOUNTER
Rx EP'd to pharmacy. Please notify patient. Requested Prescriptions     Signed Prescriptions Disp Refills    escitalopram (LEXAPRO) 10 MG tablet 30 tablet 0     Sig: TAKE 1 TABLET BY MOUTH EVERY DAY     Authorizing Provider: Sandra CAMACHO    simvastatin (ZOCOR) 40 MG tablet 30 tablet 0     Sig: TAKE 1 TABLET BY MOUTH NIGHTLY     Authorizing Provider: Mahin Mcclendon orders pended.       Electronically signed by Apryl Bradley MD on 4/18/2023 at 10:40 AM

## 2023-04-18 NOTE — TELEPHONE ENCOUNTER
Pt scheduled 5-1-23 for annual visit. He is requesting annual labs to mailed to his home. He also is out of medication and requesting a #30/0 to be sent until his appt.

## 2023-04-20 DIAGNOSIS — I10 ESSENTIAL HYPERTENSION: ICD-10-CM

## 2023-04-20 RX ORDER — LOSARTAN POTASSIUM AND HYDROCHLOROTHIAZIDE 12.5; 1 MG/1; MG/1
TABLET ORAL
Qty: 90 TABLET | Refills: 3 | Status: SHIPPED | OUTPATIENT
Start: 2023-04-20

## 2023-04-20 NOTE — TELEPHONE ENCOUNTER
Request sent from [facility] pharmacy for refill of losartan hct 100/12.5 mg qd. Last seen for acute issue on 2/14/23, next appt 5/1/23 with labs due prior. Med verified. Order pended.

## 2023-04-25 ENCOUNTER — NURSE ONLY (OUTPATIENT)
Dept: LAB | Age: 58
End: 2023-04-25

## 2023-04-25 DIAGNOSIS — R73.9 HYPERGLYCEMIA: ICD-10-CM

## 2023-04-25 DIAGNOSIS — Z00.00 ANNUAL PHYSICAL EXAM: ICD-10-CM

## 2023-04-25 LAB
ALBUMIN SERPL BCG-MCNC: 4.2 G/DL (ref 3.5–5.1)
ALP SERPL-CCNC: 92 U/L (ref 38–126)
ALT SERPL W/O P-5'-P-CCNC: 47 U/L (ref 11–66)
ANION GAP SERPL CALC-SCNC: 12 MEQ/L (ref 8–16)
AST SERPL-CCNC: 45 U/L (ref 5–40)
BILIRUB SERPL-MCNC: 0.3 MG/DL (ref 0.3–1.2)
BUN SERPL-MCNC: 18 MG/DL (ref 7–22)
CALCIUM SERPL-MCNC: 9.2 MG/DL (ref 8.5–10.5)
CHLORIDE SERPL-SCNC: 105 MEQ/L (ref 98–111)
CHOLEST SERPL-MCNC: 224 MG/DL (ref 100–199)
CO2 SERPL-SCNC: 26 MEQ/L (ref 23–33)
CREAT SERPL-MCNC: 0.9 MG/DL (ref 0.4–1.2)
DEPRECATED MEAN GLUCOSE BLD GHB EST-ACNC: 123 MG/DL (ref 70–126)
GFR SERPL CREATININE-BSD FRML MDRD: > 60 ML/MIN/1.73M2
GLUCOSE SERPL-MCNC: 99 MG/DL (ref 70–108)
HBA1C MFR BLD HPLC: 6.1 % (ref 4.4–6.4)
HDLC SERPL-MCNC: 40 MG/DL
LDLC SERPL CALC-MCNC: 110 MG/DL
POTASSIUM SERPL-SCNC: 4.4 MEQ/L (ref 3.5–5.2)
PROT SERPL-MCNC: 6.5 G/DL (ref 6.1–8)
SODIUM SERPL-SCNC: 143 MEQ/L (ref 135–145)
TRIGL SERPL-MCNC: 371 MG/DL (ref 0–199)

## 2023-05-01 ENCOUNTER — OFFICE VISIT (OUTPATIENT)
Dept: FAMILY MEDICINE CLINIC | Age: 58
End: 2023-05-01
Payer: COMMERCIAL

## 2023-05-01 VITALS
SYSTOLIC BLOOD PRESSURE: 138 MMHG | WEIGHT: 254.38 LBS | DIASTOLIC BLOOD PRESSURE: 90 MMHG | HEART RATE: 98 BPM | BODY MASS INDEX: 37.68 KG/M2 | OXYGEN SATURATION: 98 % | HEIGHT: 69 IN

## 2023-05-01 DIAGNOSIS — R73.01 IFG (IMPAIRED FASTING GLUCOSE): ICD-10-CM

## 2023-05-01 DIAGNOSIS — K42.9 UMBILICAL HERNIA WITHOUT OBSTRUCTION AND WITHOUT GANGRENE: ICD-10-CM

## 2023-05-01 DIAGNOSIS — E78.2 MIXED HYPERLIPIDEMIA: ICD-10-CM

## 2023-05-01 DIAGNOSIS — Z00.00 ANNUAL PHYSICAL EXAM: Primary | ICD-10-CM

## 2023-05-01 DIAGNOSIS — R74.01 ELEVATED AST (SGOT): ICD-10-CM

## 2023-05-01 DIAGNOSIS — E66.01 CLASS 2 SEVERE OBESITY DUE TO EXCESS CALORIES WITH SERIOUS COMORBIDITY AND BODY MASS INDEX (BMI) OF 37.0 TO 37.9 IN ADULT (HCC): ICD-10-CM

## 2023-05-01 DIAGNOSIS — F41.9 ANXIETY: ICD-10-CM

## 2023-05-01 PROCEDURE — 3075F SYST BP GE 130 - 139MM HG: CPT | Performed by: FAMILY MEDICINE

## 2023-05-01 PROCEDURE — 99396 PREV VISIT EST AGE 40-64: CPT | Performed by: FAMILY MEDICINE

## 2023-05-01 PROCEDURE — 3080F DIAST BP >= 90 MM HG: CPT | Performed by: FAMILY MEDICINE

## 2023-05-01 RX ORDER — SIMVASTATIN 40 MG
40 TABLET ORAL NIGHTLY
Qty: 90 TABLET | Refills: 3 | Status: SHIPPED | OUTPATIENT
Start: 2023-05-01

## 2023-05-01 RX ORDER — ESCITALOPRAM OXALATE 10 MG/1
10 TABLET ORAL DAILY
Qty: 90 TABLET | Refills: 3 | Status: SHIPPED | OUTPATIENT
Start: 2023-05-01

## 2023-05-01 ASSESSMENT — ENCOUNTER SYMPTOMS
ABDOMINAL PAIN: 0
CHEST TIGHTNESS: 0
BLOOD IN STOOL: 0
EYES NEGATIVE: 1
SHORTNESS OF BREATH: 0

## 2023-05-01 NOTE — PROGRESS NOTES
2023    Chief Complaint   Patient presents with    Annual Exam       Kelsea Tello (:  1965) is a 61 y.o. male, here for a preventive medicine evaluation. Patient Active Problem List   Diagnosis    Low testosterone    HTN (hypertension)    Hyperlipidemia    Allergic rhinitis    Hemorrhoid    Anxiety    KT (obstructive sleep apnea)    Class 2 severe obesity due to excess calories with serious comorbidity and body mass index (BMI) of 37.0 to 37.9 in adult [facility])     He reports he is doing well in general.  He is under a little more stress recently and has not been watching his diet as closely. He has also been less active over the winter. His weight is up somewhat. Blood pressures have been under good control. He takes his prescribed medications as directed and denies side effects. No recent illnesses or hospitalizations. Lexapro controls his depression and anxiety well. He denies chest pain, shortness of breath, dizziness, lightheadedness, or headaches. No bowel or bladder issues. Non-smoker. BMI 37. 56. Review of Systems   Constitutional:  Positive for unexpected weight change (gain). Negative for chills, fatigue and fever. HENT: Negative. Eyes: Negative. Respiratory:  Negative for chest tightness and shortness of breath. Cardiovascular:  Negative for chest pain, palpitations and leg swelling. Gastrointestinal:  Negative for abdominal pain and blood in stool. Genitourinary:  Negative for dysuria. Musculoskeletal:  Negative for joint swelling. Skin:  Negative for rash. Neurological:  Negative for dizziness. Psychiatric/Behavioral: Negative. All other systems reviewed and are negative. Prior to Visit Medications    Medication Sig Taking?  Authorizing Provider   escitalopram (LEXAPRO) 10 MG tablet Take 1 tablet by mouth daily Yes Winston Eng MD   simvastatin (ZOCOR) 40 MG tablet Take 1 tablet by mouth nightly Yes Winston Eng MD

## 2023-07-28 ENCOUNTER — TELEPHONE (OUTPATIENT)
Dept: FAMILY MEDICINE CLINIC | Age: 58
End: 2023-07-28

## 2023-07-28 NOTE — TELEPHONE ENCOUNTER
Pt has a diabetic appointment with CG on 8/25/23 and he would like to know if he needs any blood work done prior to his appointment. Please advise. *if he needs any blood work done please mail orders to his residence.

## 2023-08-21 ENCOUNTER — NURSE ONLY (OUTPATIENT)
Dept: LAB | Age: 58
End: 2023-08-21

## 2023-08-21 DIAGNOSIS — E78.2 MIXED HYPERLIPIDEMIA: ICD-10-CM

## 2023-08-21 DIAGNOSIS — R73.01 IFG (IMPAIRED FASTING GLUCOSE): ICD-10-CM

## 2023-08-21 DIAGNOSIS — R74.01 ELEVATED AST (SGOT): ICD-10-CM

## 2023-08-21 LAB
AST SERPL-CCNC: 22 U/L (ref 5–40)
DEPRECATED MEAN GLUCOSE BLD GHB EST-ACNC: 123 MG/DL (ref 70–126)
HBA1C MFR BLD HPLC: 6.1 % (ref 4.4–6.4)
TRIGL SERPL-MCNC: 280 MG/DL (ref 0–199)

## 2023-08-25 ENCOUNTER — OFFICE VISIT (OUTPATIENT)
Dept: FAMILY MEDICINE CLINIC | Age: 58
End: 2023-08-25
Payer: COMMERCIAL

## 2023-08-25 VITALS
BODY MASS INDEX: 35.18 KG/M2 | RESPIRATION RATE: 16 BRPM | DIASTOLIC BLOOD PRESSURE: 68 MMHG | HEART RATE: 76 BPM | TEMPERATURE: 98.3 F | SYSTOLIC BLOOD PRESSURE: 118 MMHG | WEIGHT: 238.2 LBS

## 2023-08-25 DIAGNOSIS — E78.2 MIXED HYPERLIPIDEMIA: ICD-10-CM

## 2023-08-25 DIAGNOSIS — E66.01 CLASS 2 SEVERE OBESITY DUE TO EXCESS CALORIES WITH SERIOUS COMORBIDITY AND BODY MASS INDEX (BMI) OF 35.0 TO 35.9 IN ADULT (HCC): ICD-10-CM

## 2023-08-25 DIAGNOSIS — R73.01 IFG (IMPAIRED FASTING GLUCOSE): Primary | ICD-10-CM

## 2023-08-25 DIAGNOSIS — H61.23 BILATERAL IMPACTED CERUMEN: ICD-10-CM

## 2023-08-25 PROCEDURE — 3074F SYST BP LT 130 MM HG: CPT | Performed by: FAMILY MEDICINE

## 2023-08-25 PROCEDURE — 3078F DIAST BP <80 MM HG: CPT | Performed by: FAMILY MEDICINE

## 2023-08-25 PROCEDURE — 99214 OFFICE O/P EST MOD 30 MIN: CPT | Performed by: FAMILY MEDICINE

## 2023-08-25 RX ORDER — SYRINGE WITH NEEDLE, 1 ML 25GX5/8"
SYRINGE, EMPTY DISPOSABLE MISCELLANEOUS
COMMUNITY
Start: 2023-06-27 | End: 2023-08-25 | Stop reason: CLARIF

## 2023-08-25 RX ORDER — FAMOTIDINE 20 MG/1
20 TABLET, FILM COATED ORAL
COMMUNITY
Start: 2016-11-02

## 2023-08-25 ASSESSMENT — ENCOUNTER SYMPTOMS
SHORTNESS OF BREATH: 0
CHEST TIGHTNESS: 0
EYES NEGATIVE: 1
ABDOMINAL PAIN: 0
BLOOD IN STOOL: 0

## 2023-08-25 NOTE — PROGRESS NOTES
2023    Noreen Valladares (:  1965) is a 60 y.o. male, Established patient, here for evaluation of the following chief complaint(s):  3 Month Follow-Up (3 month follow up for IFG) and Discuss Labs      ASSESSMENT/PLAN:    1. IFG (impaired fasting glucose)  2. Mixed hyperlipidemia  3. Class 2 severe obesity due to excess calories with serious comorbidity and body mass index (BMI) of 35.0 to 35.9 in adult ([address])  4. Bilateral impacted cerumen    Bilateral ear irrigation performed today per nursing due to cerumen impaction    He will continue to work on an [employer] and increased physical activity to reduce his weight, blood sugars, and triglycerides    Return in about 6 months (around 2024) for Follow up. SUBJECTIVE/OBJECTIVE:    HPI    Patient today for follow-up of impaired fasting glucose, obesity, hypertriglyceridemia, and elevated LFTs. He has been working on his diet and activity and is lost approximately 15 pounds since his last visit. He is cut back on portion sizes and is eating less carbs. He also had hernia surgery since his last visit here. He states that he feels well and is recovering nicely from his procedure. He reports that his ears feel clogged. He is taking his prescribed medications as directed and denies side effects. No recent illnesses. Non-smoker. BMI 35. 18. Review of Systems   Constitutional:  Negative for chills, fatigue, fever and unexpected weight change. HENT: Negative. Eyes: Negative. Respiratory:  Negative for chest tightness and shortness of breath. Cardiovascular:  Negative for chest pain, palpitations and leg swelling. Gastrointestinal:  Negative for abdominal pain and blood in stool. Genitourinary:  Negative for dysuria. Musculoskeletal:  Negative for joint swelling. Skin:  Negative for rash. Neurological:  Negative for dizziness. Psychiatric/Behavioral: Negative.      All other systems reviewed and are

## 2023-09-21 DIAGNOSIS — R79.89 LOW TESTOSTERONE: ICD-10-CM

## 2023-09-21 LAB
ALBUMIN SERPL BCG-MCNC: 4.1 G/DL (ref 3.5–5.1)
ALP SERPL-CCNC: 86 U/L (ref 38–126)
ALT SERPL W/O P-5'-P-CCNC: 28 U/L (ref 11–66)
AST SERPL-CCNC: 30 U/L (ref 5–40)
BILIRUB CONJ SERPL-MCNC: < 0.2 MG/DL (ref 0–0.3)
BILIRUB SERPL-MCNC: 0.3 MG/DL (ref 0.3–1.2)
HCT VFR BLD AUTO: 47.9 % (ref 42–52)
HGB BLD-MCNC: 14.7 GM/DL (ref 14–18)
PROT SERPL-MCNC: 6.4 G/DL (ref 6.1–8)
PSA SERPL-MCNC: 0.91 NG/ML (ref 0–1)

## 2023-09-23 LAB — TESTOST SERPL-MCNC: 366 NG/DL (ref 300–890)

## 2023-09-26 ENCOUNTER — OFFICE VISIT (OUTPATIENT)
Dept: UROLOGY | Age: 58
End: 2023-09-26
Payer: COMMERCIAL

## 2023-09-26 VITALS — WEIGHT: 238 LBS | RESPIRATION RATE: 16 BRPM | BODY MASS INDEX: 38.25 KG/M2 | HEIGHT: 66 IN

## 2023-09-26 DIAGNOSIS — R79.89 LOW TESTOSTERONE: Primary | ICD-10-CM

## 2023-09-26 DIAGNOSIS — N40.1 BENIGN LOCALIZED PROSTATIC HYPERPLASIA WITH LOWER URINARY TRACT SYMPTOMS (LUTS): ICD-10-CM

## 2023-09-26 PROCEDURE — 99214 OFFICE O/P EST MOD 30 MIN: CPT | Performed by: UROLOGY

## 2023-09-26 RX ORDER — TESTOSTERONE CYPIONATE 200 MG/ML
200 INJECTION, SOLUTION INTRAMUSCULAR
Qty: 12 ML | Refills: 0 | Status: SHIPPED | OUTPATIENT
Start: 2023-09-26 | End: 2024-03-24

## 2023-09-26 NOTE — PROGRESS NOTES
Value Date    BUN 15 06/29/2023     Lab Results   Component Value Date    CREATININE 0.9 06/29/2023       Imaging Reviewed during this Office Visit:   Omar Aragon MD independently reviewed the images and verified the radiology reports from:    No results found.     PAST MEDICAL, FAMILY AND SOCIAL HISTORY:  Past Medical History:   Diagnosis Date    Allergic rhinitis     BPH (benign prostatic hyperplasia)     Hyperlipidemia     Hypertension     Hypogonadism 9/12/2011    Hypogonadism male     Inflammatory spondylopathies ([address])     Testosterone deficiency      Past Surgical History:   Procedure Laterality Date    BACK SURGERY  08/05/2018    CARPAL TUNNEL RELEASE Left 2015    CERVICAL SPINE SURGERY  06/01/2015    Herniated disc    COLONOSCOPY  2011    ELBOW SURGERY  08/2011    Dr. Leni Hernandez Right 07/11/2023    KNEE SURGERY  09/01/2020    LUMBAR FUSION  08/2017    L5-S1, Dr. Joann Hargrove  12/17/2021    OTHER SURGICAL HISTORY  age 15    benign masses removed from chest    SINUS SURGERY      UMBILICAL HERNIA REPAIR N/A 07/11/2023     Family History   Problem Relation Age of Onset    Cancer Father     High Blood Pressure Mother      Outpatient Medications Marked as Taking for the 9/26/23 encounter (Office Visit) with Inez Chen MD   Medication Sig Dispense Refill    famotidine (PEPCID) 20 MG tablet Take 1 tablet by mouth      escitalopram (LEXAPRO) 10 MG tablet Take 1 tablet by mouth daily 90 tablet 3    simvastatin (ZOCOR) 40 MG tablet Take 1 tablet by mouth nightly 90 tablet 3    losartan-hydroCHLOROthiazide (HYZAAR) 100-12.5 MG per tablet TAKE 1 TABLET BY MOUTH EVERY DAY 90 tablet 3    NEEDLE, DISP, 23 G 23G X 1\" MISC 1 each by Does not apply route every 14 days for 30 doses 30 each 3    loratadine-pseudoephedrine (CLARITIN-D 12 HOUR) 5-120 MG per extended release tablet Take 1 tablet by mouth 2 times daily 20 tablet 0    tamsulosin (FLOMAX) 0.4 MG capsule Take 1 capsule by

## 2023-09-27 ENCOUNTER — OFFICE VISIT (OUTPATIENT)
Dept: FAMILY MEDICINE CLINIC | Age: 58
End: 2023-09-27
Payer: COMMERCIAL

## 2023-09-27 VITALS
TEMPERATURE: 98 F | WEIGHT: 242 LBS | DIASTOLIC BLOOD PRESSURE: 70 MMHG | RESPIRATION RATE: 16 BRPM | HEART RATE: 84 BPM | BODY MASS INDEX: 39.06 KG/M2 | SYSTOLIC BLOOD PRESSURE: 122 MMHG

## 2023-09-27 DIAGNOSIS — L23.7 POISON OAK: Primary | ICD-10-CM

## 2023-09-27 PROCEDURE — 99213 OFFICE O/P EST LOW 20 MIN: CPT | Performed by: NURSE PRACTITIONER

## 2023-09-27 PROCEDURE — 3078F DIAST BP <80 MM HG: CPT | Performed by: NURSE PRACTITIONER

## 2023-09-27 PROCEDURE — 3074F SYST BP LT 130 MM HG: CPT | Performed by: NURSE PRACTITIONER

## 2023-09-27 PROCEDURE — 96372 THER/PROPH/DIAG INJ SC/IM: CPT | Performed by: NURSE PRACTITIONER

## 2023-09-27 RX ORDER — METHYLPREDNISOLONE ACETATE 80 MG/ML
80 INJECTION, SUSPENSION INTRA-ARTICULAR; INTRALESIONAL; INTRAMUSCULAR; SOFT TISSUE ONCE
Status: COMPLETED | OUTPATIENT
Start: 2023-09-27 | End: 2023-09-27

## 2023-09-27 RX ADMIN — METHYLPREDNISOLONE ACETATE 80 MG: 80 INJECTION, SUSPENSION INTRA-ARTICULAR; INTRALESIONAL; INTRAMUSCULAR; SOFT TISSUE at 10:32

## 2023-09-27 ASSESSMENT — ENCOUNTER SYMPTOMS
SHORTNESS OF BREATH: 0
EYES NEGATIVE: 1
CHEST TIGHTNESS: 0

## 2023-09-27 NOTE — PROGRESS NOTES
Medication(s) given during visit:    Administrations This Visit       methylPREDNISolone acetate (DEPO-MEDROL) injection 80 mg       Admin Date  09/27/2023  10:32 Action  Given Dose  80 mg Route  IntraMUSCular Site  Dorsogluteal Right Administered By  Ofe Jung MA    Ordering Provider: BETTY Higginbotham CNP    NDC: 3425-7150-32    Lot#: [id number]    : Anystream. Patient Supplied?: No                  After obtaining consent, and per orders of Eliza Hester CNP, the injection above was given by Ofe Jung MA. Patient tolerated well.

## 2023-11-08 ENCOUNTER — NURSE ONLY (OUTPATIENT)
Dept: LAB | Age: 58
End: 2023-11-08

## 2023-11-08 ENCOUNTER — HOSPITAL ENCOUNTER (OUTPATIENT)
Age: 58
Discharge: HOME | End: 2023-11-08
Payer: COMMERCIAL

## 2023-11-08 ENCOUNTER — OFFICE VISIT (OUTPATIENT)
Dept: FAMILY MEDICINE CLINIC | Age: 58
End: 2023-11-08
Payer: COMMERCIAL

## 2023-11-08 VITALS
HEART RATE: 80 BPM | WEIGHT: 241.8 LBS | TEMPERATURE: 98.1 F | DIASTOLIC BLOOD PRESSURE: 74 MMHG | BODY MASS INDEX: 39.03 KG/M2 | SYSTOLIC BLOOD PRESSURE: 126 MMHG | RESPIRATION RATE: 18 BRPM

## 2023-11-08 DIAGNOSIS — I10 ESSENTIAL HYPERTENSION: ICD-10-CM

## 2023-11-08 DIAGNOSIS — Z01.818 PREOP TESTING: ICD-10-CM

## 2023-11-08 DIAGNOSIS — Z01.818 PREOP GENERAL PHYSICAL EXAM: Primary | ICD-10-CM

## 2023-11-08 DIAGNOSIS — R73.01 IFG (IMPAIRED FASTING GLUCOSE): ICD-10-CM

## 2023-11-08 DIAGNOSIS — E66.01 CLASS 2 SEVERE OBESITY DUE TO EXCESS CALORIES WITH SERIOUS COMORBIDITY AND BODY MASS INDEX (BMI) OF 39.0 TO 39.9 IN ADULT (HCC): ICD-10-CM

## 2023-11-08 DIAGNOSIS — M16.11 OSTEOARTHRITIS OF RIGHT HIP, UNSPECIFIED OSTEOARTHRITIS TYPE: ICD-10-CM

## 2023-11-08 LAB
ANION GAP SERPL CALC-SCNC: 10 MEQ/L (ref 8–16)
APTT PPP: 29.6 SECONDS (ref 22–38)
BASOPHILS ABSOLUTE: 0.1 THOU/MM3 (ref 0–0.1)
BASOPHILS NFR BLD AUTO: 0.7 %
BUN SERPL-MCNC: 21 MG/DL (ref 7–22)
CALCIUM SERPL-MCNC: 9.6 MG/DL (ref 8.5–10.5)
CHLORIDE SERPL-SCNC: 102 MEQ/L (ref 98–111)
CO2 SERPL-SCNC: 29 MEQ/L (ref 23–33)
CREAT SERPL-MCNC: 1 MG/DL (ref 0.4–1.2)
DEPRECATED RDW RBC AUTO: 52.1 FL (ref 35–45)
EKG ATRIAL RATE: 78 BPM
EKG P AXIS: 23 DEGREES
EKG P-R INTERVAL: 158 MS
EKG Q-T INTERVAL: 350 MS
EKG QRS DURATION: 86 MS
EKG QTC CALCULATION (BAZETT): 399 MS
EKG R AXIS: 41 DEGREES
EKG T AXIS: -2 DEGREES
EKG VENTRICULAR RATE: 78 BPM
EOSINOPHIL NFR BLD AUTO: 2.8 %
EOSINOPHILS ABSOLUTE: 0.2 THOU/MM3 (ref 0–0.4)
ERYTHROCYTE [DISTWIDTH] IN BLOOD BY AUTOMATED COUNT: 19.4 % (ref 11.5–14.5)
GFR SERPL CREATININE-BSD FRML MDRD: > 60 ML/MIN/1.73M2
GLUCOSE SERPL-MCNC: 108 MG/DL (ref 70–108)
HCT VFR BLD AUTO: 50.2 % (ref 42–52)
HGB BLD-MCNC: 15.3 GM/DL (ref 14–18)
IMM GRANULOCYTES # BLD AUTO: 0.05 THOU/MM3 (ref 0–0.07)
IMM GRANULOCYTES NFR BLD AUTO: 0.6 %
INR PPP: 0.93 (ref 0.85–1.13)
LYMPHOCYTES ABSOLUTE: 1.8 THOU/MM3 (ref 1–4.8)
LYMPHOCYTES NFR BLD AUTO: 22.5 %
MCH RBC QN AUTO: 24.4 PG (ref 26–33)
MCHC RBC AUTO-ENTMCNC: 30.5 GM/DL (ref 32.2–35.5)
MCV RBC AUTO: 79.9 FL (ref 80–94)
MONOCYTES ABSOLUTE: 0.6 THOU/MM3 (ref 0.4–1.3)
MONOCYTES NFR BLD AUTO: 7.1 %
NEUTROPHILS NFR BLD AUTO: 66.3 %
NRBC BLD AUTO-RTO: 0 /100 WBC
PLATELET # BLD AUTO: 290 THOU/MM3 (ref 130–400)
PMV BLD AUTO: 10 FL (ref 9.4–12.4)
POTASSIUM SERPL-SCNC: 4.5 MEQ/L (ref 3.5–5.2)
RBC # BLD AUTO: 6.28 MILL/MM3 (ref 4.7–6.1)
SEGMENTED NEUTROPHILS ABSOLUTE COUNT: 5.4 THOU/MM3 (ref 1.8–7.7)
SODIUM SERPL-SCNC: 141 MEQ/L (ref 135–145)
WBC # BLD AUTO: 8.2 THOU/MM3 (ref 4.8–10.8)

## 2023-11-08 PROCEDURE — 93005 ELECTROCARDIOGRAM TRACING: CPT | Performed by: NURSE PRACTITIONER

## 2023-11-08 PROCEDURE — 93010 ELECTROCARDIOGRAM REPORT: CPT | Performed by: INTERNAL MEDICINE

## 2023-11-08 PROCEDURE — 99214 OFFICE O/P EST MOD 30 MIN: CPT | Performed by: NURSE PRACTITIONER

## 2023-11-08 PROCEDURE — 3074F SYST BP LT 130 MM HG: CPT | Performed by: NURSE PRACTITIONER

## 2023-11-08 PROCEDURE — 3078F DIAST BP <80 MM HG: CPT | Performed by: NURSE PRACTITIONER

## 2023-11-08 NOTE — PROGRESS NOTES
[facility]  [address] [facility] [address]  : [phone]  Dept Fax: [phone]    SUBJECTIVE       Chief Complaint   Patient presents with    Pre-op Exam     Pt scheduled for right total hip replacement scheduled 59/35/3924 with Dr. To Fernandez with [facility] in [facility]. Our office will need to order testing. Will call for pre-op paperwork. Pt presents for PRE-OP PE for planned total right hip replacement. Surgery is scheduled for [place] with Dr. To Fernandez on 50/76/50. General anesthesia is planned. Current symptoms include pain, difficulty with ambulation. Previous therapy tried includes:     CURRENT EXERCISE REGIMEN: None    ANESTHESIA PROBLEMS? Denies    Pt denies chest pain or sob. Had hernia surgery 3 months ago without any issues. Dr Jessica Allen at [facility].      Current medical problems include   Patient Active Problem List   Diagnosis    Low testosterone    HTN (hypertension)    Hyperlipidemia    Allergic rhinitis    Hemorrhoid    Anxiety    KT (obstructive sleep apnea)    Class 2 severe obesity due to excess calories with serious comorbidity and body mass index (BMI) of 37.0 to 37.9 in adult [facility])       Past Medical History:   Diagnosis Date    Allergic rhinitis     BPH (benign prostatic hyperplasia)     Hyperlipidemia     Hypertension     Hypogonadism 9/12/2011    Hypogonadism male     Inflammatory spondylopathies ([address])     Testosterone deficiency        Past Surgical History:   Procedure Laterality Date    BACK SURGERY  08/05/2018    CARPAL TUNNEL RELEASE Left 2015    CERVICAL SPINE SURGERY  06/01/2015    Herniated disc    COLONOSCOPY  2011    ELBOW SURGERY  08/2011    Dr. Denny Tobni Right 07/11/2023    KNEE SURGERY  09/01/2020    LUMBAR FUSION  08/2017    L5-S1, Dr. Duvall Solar  12/17/2021    OTHER SURGICAL HISTORY  age 15    benign masses removed from chest    SINUS SURGERY      UMBILICAL HERNIA REPAIR N/A 07/11/2023       Allergies

## 2023-11-09 ASSESSMENT — ENCOUNTER SYMPTOMS
EYES NEGATIVE: 1
SHORTNESS OF BREATH: 0
ABDOMINAL PAIN: 0
BLOOD IN STOOL: 0
CHEST TIGHTNESS: 0

## 2023-11-19 LAB
EKG ATRIAL RATE: 78 BPM
EKG P AXIS: 23 DEGREES
EKG P-R INTERVAL: 158 MS
EKG Q-T INTERVAL: 350 MS
EKG QRS DURATION: 86 MS
EKG QTC CALCULATION (BAZETT): 399 MS
EKG R AXIS: 41 DEGREES
EKG T AXIS: -2 DEGREES
EKG VENTRICULAR RATE: 78 BPM

## 2023-11-28 ENCOUNTER — TELEPHONE (OUTPATIENT)
Dept: CARDIOLOGY CLINIC | Age: 58
End: 2023-11-28

## 2023-11-28 ENCOUNTER — TELEPHONE (OUTPATIENT)
Dept: FAMILY MEDICINE CLINIC | Age: 58
End: 2023-11-28

## 2023-11-28 DIAGNOSIS — R94.31 ABNORMAL EKG: Primary | ICD-10-CM

## 2023-11-28 NOTE — TELEPHONE ENCOUNTER
Please verify with patient that he is not having any chest pain or sob. He denied this at his appt but would like to verify.  TS

## 2023-11-28 NOTE — TELEPHONE ENCOUNTER
Jeff Davies from [facility] called stating they received the faxed surgical clearance and pre-op testing results and noticed that the EKG was abnormal. For anesthesia purposes she would like to have TS addend the surgical clearance office note to state that she reviewed the EKG and is aware it is abnormal and pt is OK to proceed with surgery. Also requesting a copy of the EKG dated 11/8/23 because the one they received was missing part of the report.      Fax: [phone] [address]: [phone]

## 2023-11-28 NOTE — TELEPHONE ENCOUNTER
Please see cardiology phone encounter dated 11-28-23 . Pt is aware of this and I will route on the call.

## 2023-11-28 NOTE — TELEPHONE ENCOUNTER
Spoke with pt. Pt is scheduled for surgery on Thursday. I will copy the call from JOVANA Wick today into this message. Milagros Santana from [facility] called stating they received the faxed surgical clearance and pre-op testing results and noticed that the EKG was abnormal. For anesthesia purposes she would like to have TS addend the surgical clearance office note to state that she reviewed the EKG and is aware it is abnormal and pt is OK to proceed with surgery. Also requesting a copy of the EKG dated 11/8/23 because the one they received was missing part of the report. Fax: [phone]   [address] [address]: [phone]             XB    11/28/23 11:15 AM  Erika Willis CMA ([facility]) routed this conversation to BETTY Vera CNP, APRN - CNP         11/28/23 12:44 PM  Note     Please verify with patient that he is not having any chest pain or sob. He denied this at his appt but would like to verify. TS                 11/28/23 12:44 PM  Susan Wick APRN - CNP routed this conversation to [facility] [address] Staff   BETTY Vera CNP         11/28/23  2:11 PM  Note     Discussed with CG. Would like to  do  a stress test. Could we see if cardiology can get him in asap since surgery is Thursday? Will need Cardiolite.  TS

## 2023-11-28 NOTE — TELEPHONE ENCOUNTER
Discussed with CG. Would like to  do  a stress test. Could we see if cardiology can get him in asap since surgery is Thursday? Will need Cardiolite.  TS

## 2023-11-28 NOTE — TELEPHONE ENCOUNTER
Ely Mayen NP sent us a referral for abnormal EKG. Called patient. He had no idea of this referral.  He will call his PCP and find out what's going on.

## 2023-11-29 ENCOUNTER — OFFICE VISIT (OUTPATIENT)
Dept: CARDIOLOGY CLINIC | Age: 58
End: 2023-11-29
Payer: COMMERCIAL

## 2023-11-29 ENCOUNTER — TELEPHONE (OUTPATIENT)
Dept: FAMILY MEDICINE CLINIC | Age: 58
End: 2023-11-29

## 2023-11-29 VITALS
BODY MASS INDEX: 36.38 KG/M2 | WEIGHT: 245.6 LBS | HEART RATE: 93 BPM | DIASTOLIC BLOOD PRESSURE: 79 MMHG | HEIGHT: 69 IN | SYSTOLIC BLOOD PRESSURE: 131 MMHG

## 2023-11-29 DIAGNOSIS — Z01.818 PREOP TESTING: ICD-10-CM

## 2023-11-29 DIAGNOSIS — E78.2 MIXED HYPERLIPIDEMIA: ICD-10-CM

## 2023-11-29 DIAGNOSIS — Z01.818 PRE-OP EVALUATION: Primary | ICD-10-CM

## 2023-11-29 DIAGNOSIS — Z01.818 PREOP EXAMINATION: Primary | ICD-10-CM

## 2023-11-29 DIAGNOSIS — R94.31 ABNORMAL EKG: ICD-10-CM

## 2023-11-29 DIAGNOSIS — I10 PRIMARY HYPERTENSION: ICD-10-CM

## 2023-11-29 DIAGNOSIS — R06.09 DOE (DYSPNEA ON EXERTION): ICD-10-CM

## 2023-11-29 PROCEDURE — 3078F DIAST BP <80 MM HG: CPT | Performed by: INTERNAL MEDICINE

## 2023-11-29 PROCEDURE — 3075F SYST BP GE 130 - 139MM HG: CPT | Performed by: INTERNAL MEDICINE

## 2023-11-29 PROCEDURE — 99204 OFFICE O/P NEW MOD 45 MIN: CPT | Performed by: INTERNAL MEDICINE

## 2023-11-29 NOTE — TELEPHONE ENCOUNTER
Pt contacted his surgeon's office and they recommended having the Echo and stress test orders faxed over to [facility] as they can get this completed sooner than December 11th so he can be cleared. Pt would like to get surgery completed before the end of the year as he has already met his deductible and is having a lot of hip pain. Pt asking if he can be kept updated on what is going on.     F# [phone]  Attn: Shirin Pickett or Rona Dee  Thanks, TS

## 2023-11-29 NOTE — TELEPHONE ENCOUNTER
Pt was seen by Dr. Tony Valdovinos today and was not cleared for surgery. I contacted Fabrizio Bragg at [facility] ([phone]) and notified her that he is not cleared and she will take care of canceling the surgery and notifying the physician.

## 2023-11-29 NOTE — PROGRESS NOTES
Chief Complaint   Patient presents with    New Patient     Abnormal EKG    Pre-op Exam     New patient referred for abnormal EKG. Pre-op exam for right hip replacement with Dr. Mani Ewing at [facility] on 11-. EKG done 11-8-2023.     Denied cp, palpitation , dizziness or edema    REINOSO    Limited exercise with hip problem    Nonsmoker    FHX  None for cad  NONE FOR cva      Past Surgical History:   Procedure Laterality Date    BACK SURGERY  08/05/2018    CARPAL TUNNEL RELEASE Left 2015    CERVICAL SPINE SURGERY  06/01/2015    Herniated disc    COLONOSCOPY  2011    ELBOW SURGERY  08/2011    Dr. Bryan Lee Right 07/11/2023    KNEE SURGERY  09/01/2020    LUMBAR FUSION  08/2017    L5-S1, Dr. Litzy Sam  12/17/2021    OTHER SURGICAL HISTORY  age 15    benign masses removed from chest    SINUS SURGERY      UMBILICAL HERNIA REPAIR N/A 07/11/2023       Allergies   Allergen Reactions    Ace Inhibitors     Amoxicillin-Pot Clavulanate Nausea Only    Avelox [Moxifloxacin Hcl In Nacl] Nausea Only    Bactrim Nausea Only        Family History   Problem Relation Age of Onset    Cancer Father     High Blood Pressure Mother         Social History     Socioeconomic History    Marital status:      Spouse name: Not on file    Number of children: Not on file    Years of education: Not on file    Highest education level: Not on file   Occupational History    Not on file   Tobacco Use    Smoking status: Never    Smokeless tobacco: Never   Vaping Use    Vaping Use: Never used   Substance and Sexual Activity    Alcohol use: Yes     Comment: social    Drug use: No    Sexual activity: Yes   Other Topics Concern    Not on file   Social History Narrative    Not on file     Social Determinants of Health     Financial Resource Strain: Low Risk  (2/14/2023)    Overall Financial Resource Strain (CARDIA)     Difficulty of Paying Living Expenses: Not hard at all   Food Insecurity: Not on

## 2023-11-29 NOTE — TELEPHONE ENCOUNTER
Cristofer Little, APRN - CNP Nurse Practitioner Signed    2:26 PM     Copy     Pt contacted his surgeon's office and they recommended having the Echo and stress test orders faxed over to [facility] as they can get this completed sooner than December 11th so he can be cleared. Pt would like to get surgery completed before the end of the year as he has already met his deductible and is having a lot of hip pain. Pt asking if he can be kept updated on what is going on. F# [phone]  Attn: Leena Guaman or Rona Dee  Thanks, TS          This message give to Dr Taylor Chadwick team for tomorrow.

## 2023-11-30 NOTE — TELEPHONE ENCOUNTER
Spoke with pt. Testing will be moved up to Dec 7, 2023 here at [address] Informed pt he would need to be here longer due to scheduling both tests on the same day. Pt voiced understanding. If pt wants to go to [place]. [place] - he would need to see a different cardiologist there per Dr. Patrick Tovar.

## 2023-12-05 ENCOUNTER — HOSPITAL ENCOUNTER (OUTPATIENT)
Dept: NUCLEAR MEDICINE | Age: 58
Discharge: HOME | End: 2023-12-05
Attending: INTERNAL MEDICINE
Payer: COMMERCIAL

## 2023-12-05 ENCOUNTER — HOSPITAL ENCOUNTER (OUTPATIENT)
Age: 58
Discharge: HOME | End: 2023-12-07
Attending: INTERNAL MEDICINE
Payer: COMMERCIAL

## 2023-12-05 VITALS — HEIGHT: 69 IN | WEIGHT: 238 LBS | BODY MASS INDEX: 35.25 KG/M2

## 2023-12-05 DIAGNOSIS — R94.31 ABNORMAL EKG: ICD-10-CM

## 2023-12-05 DIAGNOSIS — I10 PRIMARY HYPERTENSION: ICD-10-CM

## 2023-12-05 DIAGNOSIS — R06.09 DOE (DYSPNEA ON EXERTION): ICD-10-CM

## 2023-12-05 DIAGNOSIS — Z01.818 PRE-OP EVALUATION: ICD-10-CM

## 2023-12-05 DIAGNOSIS — E78.2 MIXED HYPERLIPIDEMIA: ICD-10-CM

## 2023-12-05 LAB
ECHO AO ASC DIAM: 3.6 CM
ECHO AO ASCENDING AORTA INDEX: 1.62 CM/M2
ECHO AV CUSP MM: 2.3 CM
ECHO AV PEAK GRADIENT: 8 MMHG
ECHO AV PEAK VELOCITY: 1.4 M/S
ECHO AV VELOCITY RATIO: 0.79
ECHO BSA: 2.29 M2
ECHO BSA: 2.29 M2
ECHO LA DIAMETER INDEX: 1.53 CM/M2
ECHO LA DIAMETER: 3.4 CM
ECHO LV E' LATERAL VELOCITY: 6 CM/S
ECHO LV E' SEPTAL VELOCITY: 9 CM/S
ECHO LV FRACTIONAL SHORTENING: 30 % (ref 28–44)
ECHO LV INTERNAL DIMENSION DIASTOLE INDEX: 2.25 CM/M2
ECHO LV INTERNAL DIMENSION DIASTOLIC: 5 CM (ref 4.2–5.9)
ECHO LV INTERNAL DIMENSION SYSTOLIC INDEX: 1.58 CM/M2
ECHO LV INTERNAL DIMENSION SYSTOLIC: 3.5 CM
ECHO LV ISOVOLUMETRIC RELAXATION TIME (IVRT): 53 MS
ECHO LV IVSD: 0.9 CM (ref 0.6–1)
ECHO LV MASS 2D: 146.8 G (ref 88–224)
ECHO LV MASS INDEX 2D: 66.1 G/M2 (ref 49–115)
ECHO LV POSTERIOR WALL DIASTOLIC: 0.8 CM (ref 0.6–1)
ECHO LV RELATIVE WALL THICKNESS RATIO: 0.32
ECHO LVOT PEAK GRADIENT: 5 MMHG
ECHO LVOT PEAK VELOCITY: 1.1 M/S
ECHO MV A VELOCITY: 0.92 M/S
ECHO MV E DECELERATION TIME (DT): 270 MS
ECHO MV E VELOCITY: 0.72 M/S
ECHO MV E/A RATIO: 0.78
ECHO MV E/E' LATERAL: 12
ECHO MV E/E' RATIO (AVERAGED): 10
ECHO PV MAX VELOCITY: 0.7 M/S
ECHO PV PEAK GRADIENT: 2 MMHG
ECHO RV INTERNAL DIMENSION: 2.9 CM
ECHO RV TAPSE: 2.5 CM (ref 1.7–?)
ECHO TV E WAVE: 0.8 M/S
NUC STRESS EJECTION FRACTION: 52 %
STRESS BASELINE DIAS BP: 78 MMHG
STRESS BASELINE HR: 87 BPM
STRESS BASELINE ST DEPRESSION: 0 MM
STRESS BASELINE SYS BP: 154 MMHG
STRESS ST DEPRESSION: 0 MM
STRESS STAGE 1 BP: NORMAL MMHG
STRESS STAGE 1 DURATION: 1 MIN:SEC
STRESS STAGE 1 HR: 92 BPM
STRESS STAGE 2 BP: NORMAL MMHG
STRESS STAGE 2 DURATION: 1 MIN:SEC
STRESS STAGE 2 HR: 100 BPM
STRESS STAGE 3 BP: NORMAL MMHG
STRESS STAGE 3 DURATION: 1 MIN:SEC
STRESS STAGE 3 HR: 97 BPM
STRESS STAGE RECOVERY 1 BP: NORMAL MMHG
STRESS STAGE RECOVERY 1 DURATION: 1 MIN:SEC
STRESS STAGE RECOVERY 1 HR: 96 BPM
STRESS STAGE RECOVERY 2 BP: NORMAL MMHG
STRESS STAGE RECOVERY 2 DURATION: 1 MIN:SEC
STRESS STAGE RECOVERY 2 HR: 92 BPM
STRESS STAGE RECOVERY 3 BP: NORMAL MMHG
STRESS STAGE RECOVERY 3 DURATION: 1 MIN:SEC
STRESS STAGE RECOVERY 3 HR: 96 BPM
STRESS STAGE RECOVERY 4 BP: NORMAL MMHG
STRESS STAGE RECOVERY 4 DURATION: 1 MIN:SEC
STRESS STAGE RECOVERY 4 HR: 92 BPM
STRESS TARGET HR: 161 BPM
TID: 1.1

## 2023-12-05 PROCEDURE — 93016 CV STRESS TEST SUPVJ ONLY: CPT | Performed by: INTERNAL MEDICINE

## 2023-12-05 PROCEDURE — A9500 TC99M SESTAMIBI: HCPCS | Performed by: INTERNAL MEDICINE

## 2023-12-05 PROCEDURE — 93017 CV STRESS TEST TRACING ONLY: CPT

## 2023-12-05 PROCEDURE — 78452 HT MUSCLE IMAGE SPECT MULT: CPT | Performed by: INTERNAL MEDICINE

## 2023-12-05 PROCEDURE — 93306 TTE W/DOPPLER COMPLETE: CPT | Performed by: INTERNAL MEDICINE

## 2023-12-05 PROCEDURE — 78452 HT MUSCLE IMAGE SPECT MULT: CPT

## 2023-12-05 PROCEDURE — 93018 CV STRESS TEST I&R ONLY: CPT | Performed by: INTERNAL MEDICINE

## 2023-12-05 PROCEDURE — 6360000002 HC RX W HCPCS: Performed by: INTERNAL MEDICINE

## 2023-12-05 PROCEDURE — 93306 TTE W/DOPPLER COMPLETE: CPT

## 2023-12-05 PROCEDURE — 3430000000 HC RX DIAGNOSTIC RADIOPHARMACEUTICAL: Performed by: INTERNAL MEDICINE

## 2023-12-05 RX ORDER — TETRAKIS(2-METHOXYISOBUTYLISOCYANIDE)COPPER(I) TETRAFLUOROBORATE 1 MG/ML
10 INJECTION, POWDER, LYOPHILIZED, FOR SOLUTION INTRAVENOUS
Status: COMPLETED | OUTPATIENT
Start: 2023-12-05 | End: 2023-12-05

## 2023-12-05 RX ORDER — REGADENOSON 0.08 MG/ML
0.4 INJECTION, SOLUTION INTRAVENOUS
Status: COMPLETED | OUTPATIENT
Start: 2023-12-05 | End: 2023-12-05

## 2023-12-05 RX ORDER — TETRAKIS(2-METHOXYISOBUTYLISOCYANIDE)COPPER(I) TETRAFLUOROBORATE 1 MG/ML
30.6 INJECTION, POWDER, LYOPHILIZED, FOR SOLUTION INTRAVENOUS
Status: COMPLETED | OUTPATIENT
Start: 2023-12-05 | End: 2023-12-05

## 2023-12-05 RX ADMIN — REGADENOSON 0.4 MG: 0.08 INJECTION, SOLUTION INTRAVENOUS at 15:08

## 2023-12-05 RX ADMIN — Medication 30.6 MILLICURIE: at 15:07

## 2023-12-05 RX ADMIN — Medication 10 MILLICURIE: at 14:20

## 2023-12-06 ENCOUNTER — TELEPHONE (OUTPATIENT)
Dept: FAMILY MEDICINE CLINIC | Age: 58
End: 2023-12-06

## 2023-12-06 ENCOUNTER — TELEPHONE (OUTPATIENT)
Dept: CARDIOLOGY CLINIC | Age: 58
End: 2023-12-06

## 2023-12-06 NOTE — TELEPHONE ENCOUNTER
Pt had a stress test and echocardiogram at [facility] yesterday and is calling for the results. I called the pt and notified to reach out to Dr. Jumana Marroquin office because he is the one who ordered the testing and he said he did but they asked him to call his PCP. Called Dr. Jumana Marroquin office and left a detailed message on their nurse VM asking them to call the pt with the testing results as he is unable to schedule his surgery until he is cleared and wants to have surgery before the end of the year.

## 2023-12-06 NOTE — TELEPHONE ENCOUNTER
----- Message from Hina Decker sent at 12/6/2023  8:19 AM EST -----  Subject: Results Request    QUESTIONS  Results: echo, stress test ; Ordered by:   Date Performed: 2023-12-05  ---------------------------------------------------------------------------  --------------  Buck SAPP    [phone]; OK to leave message on voicemail  ---------------------------------------------------------------------------  --------------

## 2023-12-12 ENCOUNTER — TELEPHONE (OUTPATIENT)
Dept: FAMILY MEDICINE CLINIC | Age: 58
End: 2023-12-12

## 2023-12-12 ENCOUNTER — OFFICE VISIT (OUTPATIENT)
Dept: FAMILY MEDICINE CLINIC | Age: 58
End: 2023-12-12
Payer: COMMERCIAL

## 2023-12-12 ENCOUNTER — NURSE ONLY (OUTPATIENT)
Dept: LAB | Age: 58
End: 2023-12-12

## 2023-12-12 VITALS
SYSTOLIC BLOOD PRESSURE: 130 MMHG | TEMPERATURE: 98.1 F | HEIGHT: 69 IN | RESPIRATION RATE: 16 BRPM | HEART RATE: 64 BPM | WEIGHT: 241 LBS | BODY MASS INDEX: 35.7 KG/M2 | DIASTOLIC BLOOD PRESSURE: 74 MMHG

## 2023-12-12 DIAGNOSIS — Z01.818 PREOP EXAMINATION: ICD-10-CM

## 2023-12-12 DIAGNOSIS — R73.01 IFG (IMPAIRED FASTING GLUCOSE): ICD-10-CM

## 2023-12-12 DIAGNOSIS — I10 ESSENTIAL HYPERTENSION: ICD-10-CM

## 2023-12-12 DIAGNOSIS — M16.11 OSTEOARTHRITIS OF RIGHT HIP, UNSPECIFIED OSTEOARTHRITIS TYPE: ICD-10-CM

## 2023-12-12 DIAGNOSIS — E66.01 CLASS 2 SEVERE OBESITY DUE TO EXCESS CALORIES WITH SERIOUS COMORBIDITY AND BODY MASS INDEX (BMI) OF 39.0 TO 39.9 IN ADULT (HCC): ICD-10-CM

## 2023-12-12 DIAGNOSIS — Z01.818 PREOP EXAMINATION: Primary | ICD-10-CM

## 2023-12-12 DIAGNOSIS — Z01.818 PREOP TESTING: ICD-10-CM

## 2023-12-12 LAB
ANION GAP SERPL CALC-SCNC: 11 MEQ/L (ref 8–16)
APTT PPP: 35.7 SECONDS (ref 22–38)
BASOPHILS ABSOLUTE: 0.1 THOU/MM3 (ref 0–0.1)
BASOPHILS NFR BLD AUTO: 0.8 %
BUN SERPL-MCNC: 21 MG/DL (ref 7–22)
CALCIUM SERPL-MCNC: 9.6 MG/DL (ref 8.5–10.5)
CHLORIDE SERPL-SCNC: 102 MEQ/L (ref 98–111)
CO2 SERPL-SCNC: 27 MEQ/L (ref 23–33)
CREAT SERPL-MCNC: 0.9 MG/DL (ref 0.4–1.2)
DEPRECATED RDW RBC AUTO: 55.3 FL (ref 35–45)
EOSINOPHIL NFR BLD AUTO: 2.7 %
EOSINOPHILS ABSOLUTE: 0.2 THOU/MM3 (ref 0–0.4)
ERYTHROCYTE [DISTWIDTH] IN BLOOD BY AUTOMATED COUNT: 20.9 % (ref 11.5–14.5)
GFR SERPL CREATININE-BSD FRML MDRD: > 60 ML/MIN/1.73M2
GLUCOSE SERPL-MCNC: 105 MG/DL (ref 70–108)
HCT VFR BLD AUTO: 55.5 % (ref 42–52)
HGB BLD-MCNC: 16.5 GM/DL (ref 14–18)
IMM GRANULOCYTES # BLD AUTO: 0.03 THOU/MM3 (ref 0–0.07)
IMM GRANULOCYTES NFR BLD AUTO: 0.4 %
INR PPP: 0.98 (ref 0.85–1.13)
LYMPHOCYTES ABSOLUTE: 1.8 THOU/MM3 (ref 1–4.8)
LYMPHOCYTES NFR BLD AUTO: 25.1 %
MCH RBC QN AUTO: 23.7 PG (ref 26–33)
MCHC RBC AUTO-ENTMCNC: 29.7 GM/DL (ref 32.2–35.5)
MCV RBC AUTO: 79.6 FL (ref 80–94)
MONOCYTES ABSOLUTE: 0.5 THOU/MM3 (ref 0.4–1.3)
MONOCYTES NFR BLD AUTO: 6.6 %
NEUTROPHILS NFR BLD AUTO: 64.4 %
NRBC BLD AUTO-RTO: 0 /100 WBC
PLATELET # BLD AUTO: 283 THOU/MM3 (ref 130–400)
PMV BLD AUTO: 10.3 FL (ref 9.4–12.4)
POTASSIUM SERPL-SCNC: 4.5 MEQ/L (ref 3.5–5.2)
RBC # BLD AUTO: 6.97 MILL/MM3 (ref 4.7–6.1)
SEGMENTED NEUTROPHILS ABSOLUTE COUNT: 4.6 THOU/MM3 (ref 1.8–7.7)
SODIUM SERPL-SCNC: 140 MEQ/L (ref 135–145)
WBC # BLD AUTO: 7.1 THOU/MM3 (ref 4.8–10.8)

## 2023-12-12 PROCEDURE — 3075F SYST BP GE 130 - 139MM HG: CPT | Performed by: NURSE PRACTITIONER

## 2023-12-12 PROCEDURE — 3078F DIAST BP <80 MM HG: CPT | Performed by: NURSE PRACTITIONER

## 2023-12-12 PROCEDURE — 99214 OFFICE O/P EST MOD 30 MIN: CPT | Performed by: NURSE PRACTITIONER

## 2023-12-12 ASSESSMENT — ENCOUNTER SYMPTOMS
SHORTNESS OF BREATH: 0
ABDOMINAL PAIN: 0
CHEST TIGHTNESS: 0
EYES NEGATIVE: 1
BLOOD IN STOOL: 0

## 2023-12-12 NOTE — TELEPHONE ENCOUNTER
----- Message from BETTY Chairez CNP sent at 12/12/2023  1:26 PM EST -----  Labs look good. Will clear for surgery.  TS

## 2023-12-29 PROBLEM — Z01.818 PRE-OP EVALUATION: Status: RESOLVED | Noted: 2023-11-29 | Resolved: 2023-12-29

## 2024-03-01 ENCOUNTER — OFFICE VISIT (OUTPATIENT)
Dept: FAMILY MEDICINE CLINIC | Age: 59
End: 2024-03-01
Payer: COMMERCIAL

## 2024-03-01 VITALS
RESPIRATION RATE: 16 BRPM | TEMPERATURE: 97.9 F | BODY MASS INDEX: 36.89 KG/M2 | WEIGHT: 249.8 LBS | DIASTOLIC BLOOD PRESSURE: 70 MMHG | HEART RATE: 88 BPM | SYSTOLIC BLOOD PRESSURE: 118 MMHG

## 2024-03-01 DIAGNOSIS — J01.40 ACUTE PANSINUSITIS, RECURRENCE NOT SPECIFIED: Primary | ICD-10-CM

## 2024-03-01 PROCEDURE — 99213 OFFICE O/P EST LOW 20 MIN: CPT | Performed by: FAMILY MEDICINE

## 2024-03-01 PROCEDURE — 3074F SYST BP LT 130 MM HG: CPT | Performed by: FAMILY MEDICINE

## 2024-03-01 PROCEDURE — 3078F DIAST BP <80 MM HG: CPT | Performed by: FAMILY MEDICINE

## 2024-03-01 RX ORDER — CEFDINIR 300 MG/1
300 CAPSULE ORAL 2 TIMES DAILY
Qty: 20 CAPSULE | Refills: 0 | Status: SHIPPED | OUTPATIENT
Start: 2024-03-01 | End: 2024-03-11

## 2024-03-01 NOTE — PROGRESS NOTES
3/1/2024    Ryan Gaming (:  1965) is a 58 y.o. male, Established patient, here for evaluation of the following chief complaint(s):  Sinus Problem (Sinus pressure and pressure in ears for the past few weeks. Using Zyrtec with little benefit. )      ASSESSMENT/PLAN:    1. Acute pansinusitis, recurrence not specified  -     cefdinir (OMNICEF) 300 MG capsule; Take 1 capsule by mouth 2 times daily for 10 days, Disp-20 capsule, R-0Normal    Due to length of symptomatology, treat with Omnicef 3 mg p.o. twice daily x 10 days    Rest, fluids, and OTC cold or allergy medication as needed for symptomatic relief    Follow-up if not improved    SUBJECTIVE/OBJECTIVE:    HPI    Patient today with a 2-week history of head congestion, postnasal drip, cough.  He has been taking OTC Zyrtec but states that his symptoms or not improving.  He denies any fever, chills, or sweats.  He has a sinus headache and pressure.  No vomiting or diarrhea.  No earache or sore throat.  Non-smoker.    Review of Systems   Constitutional:  Negative for chills, diaphoresis and fever.   HENT:  Positive for congestion, postnasal drip, sinus pressure and sinus pain.    Respiratory:  Positive for cough.    Gastrointestinal:  Negative for diarrhea, nausea and vomiting.   Musculoskeletal:  Positive for myalgias. Negative for arthralgias.   Neurological:  Positive for dizziness and headaches.   All other systems reviewed and are negative.          Vitals:    24 1111   BP: 118/70   Pulse: 88   Resp: 16   Temp: 97.9 °F (36.6 °C)   TempSrc: Oral   Weight: 113.3 kg (249 lb 12.8 oz)       Wt Readings from Last 3 Encounters:   24 113.3 kg (249 lb 12.8 oz)   23 109.3 kg (241 lb)   23 108 kg (238 lb)       BP Readings from Last 3 Encounters:   24 118/70   23 130/74   23 131/79       Physical Exam  Vitals reviewed.   Constitutional:       General: He is not in acute distress.     Appearance: He is

## 2024-05-15 ENCOUNTER — OFFICE VISIT (OUTPATIENT)
Dept: FAMILY MEDICINE CLINIC | Age: 59
End: 2024-05-15
Payer: COMMERCIAL

## 2024-05-15 VITALS
OXYGEN SATURATION: 95 % | WEIGHT: 250 LBS | DIASTOLIC BLOOD PRESSURE: 78 MMHG | RESPIRATION RATE: 18 BRPM | BODY MASS INDEX: 37.03 KG/M2 | HEART RATE: 90 BPM | HEIGHT: 69 IN | SYSTOLIC BLOOD PRESSURE: 132 MMHG | TEMPERATURE: 98.9 F

## 2024-05-15 DIAGNOSIS — J01.40 ACUTE PANSINUSITIS, RECURRENCE NOT SPECIFIED: Primary | ICD-10-CM

## 2024-05-15 DIAGNOSIS — J30.1 SEASONAL ALLERGIC RHINITIS DUE TO POLLEN: ICD-10-CM

## 2024-05-15 PROCEDURE — 3075F SYST BP GE 130 - 139MM HG: CPT | Performed by: FAMILY MEDICINE

## 2024-05-15 PROCEDURE — 96372 THER/PROPH/DIAG INJ SC/IM: CPT | Performed by: FAMILY MEDICINE

## 2024-05-15 PROCEDURE — 99213 OFFICE O/P EST LOW 20 MIN: CPT | Performed by: FAMILY MEDICINE

## 2024-05-15 PROCEDURE — 3078F DIAST BP <80 MM HG: CPT | Performed by: FAMILY MEDICINE

## 2024-05-15 RX ORDER — METHYLPREDNISOLONE ACETATE 80 MG/ML
80 INJECTION, SUSPENSION INTRA-ARTICULAR; INTRALESIONAL; INTRAMUSCULAR; SOFT TISSUE ONCE
Status: COMPLETED | OUTPATIENT
Start: 2024-05-15 | End: 2024-05-15

## 2024-05-15 RX ORDER — CEFDINIR 300 MG/1
300 CAPSULE ORAL 2 TIMES DAILY
Qty: 20 CAPSULE | Refills: 0 | Status: SHIPPED | OUTPATIENT
Start: 2024-05-15 | End: 2024-05-25

## 2024-05-15 RX ADMIN — METHYLPREDNISOLONE ACETATE 80 MG: 80 INJECTION, SUSPENSION INTRA-ARTICULAR; INTRALESIONAL; INTRAMUSCULAR; SOFT TISSUE at 11:50

## 2024-05-15 SDOH — ECONOMIC STABILITY: FOOD INSECURITY: WITHIN THE PAST 12 MONTHS, YOU WORRIED THAT YOUR FOOD WOULD RUN OUT BEFORE YOU GOT MONEY TO BUY MORE.: NEVER TRUE

## 2024-05-15 SDOH — ECONOMIC STABILITY: FOOD INSECURITY: WITHIN THE PAST 12 MONTHS, THE FOOD YOU BOUGHT JUST DIDN'T LAST AND YOU DIDN'T HAVE MONEY TO GET MORE.: NEVER TRUE

## 2024-05-15 SDOH — ECONOMIC STABILITY: INCOME INSECURITY: HOW HARD IS IT FOR YOU TO PAY FOR THE VERY BASICS LIKE FOOD, HOUSING, MEDICAL CARE, AND HEATING?: NOT HARD AT ALL

## 2024-05-15 ASSESSMENT — ENCOUNTER SYMPTOMS
SORE THROAT: 0
RESPIRATORY NEGATIVE: 1
SINUS PAIN: 1
GASTROINTESTINAL NEGATIVE: 1

## 2024-05-15 ASSESSMENT — PATIENT HEALTH QUESTIONNAIRE - PHQ9
SUM OF ALL RESPONSES TO PHQ QUESTIONS 1-9: 0
SUM OF ALL RESPONSES TO PHQ QUESTIONS 1-9: 0
2. FEELING DOWN, DEPRESSED OR HOPELESS: NOT AT ALL
SUM OF ALL RESPONSES TO PHQ QUESTIONS 1-9: 0
SUM OF ALL RESPONSES TO PHQ QUESTIONS 1-9: 0
SUM OF ALL RESPONSES TO PHQ9 QUESTIONS 1 & 2: 0
1. LITTLE INTEREST OR PLEASURE IN DOING THINGS: NOT AT ALL

## 2024-05-15 NOTE — PROGRESS NOTES
5/15/2024    Ryan Gaming (:  1965) is a 58 y.o. male, Established patient, here for evaluation of the following chief complaint(s):  Sinus Problem (Started about a month ago, two weeks ago started getting worse and now has pain in his ears. Pain in the face in eyes and fatigue . - no other concerns - has been using Claritin )      ASSESSMENT/PLAN:    1. Acute pansinusitis, recurrence not specified  -     cefdinir (OMNICEF) 300 MG capsule; Take 1 capsule by mouth 2 times daily for 10 days, Disp-20 capsule, R-0Normal  2. Seasonal allergic rhinitis due to pollen  -     methylPREDNISolone acetate (DEPO-MEDROL) injection 80 mg; 80 mg, IntraMUSCular, ONCE, 1 dose, On Wed 5/15/24 at 1200      Due to length of symptomatology and worsening symptoms, treat with Omnicef 300 mg p.o. twice daily x 10 days    Depo-Medrol 80 mg IM due to worsening allergies    Follow-up if not improved    SUBJECTIVE/OBJECTIVE:    HPI    Patient here today with 1 month history of worsening symptoms including head congestion, sinus pressure, sneezing, nasal drainage, and itchy/watery eyes.  Symptoms seem worse after being outside and mowing.  He does report some mild bodyaches but otherwise no fever, chills, or sweats.  He has tried some OTC antihistamines which have not been beneficial.  He feels that the sinus symptoms are worsening.  He has developed a right ear ache and a headache.  Nasal drainage ranges from clear to colored.  Non-smoker.  BMI 36.92.    Review of Systems   Constitutional:  Positive for fatigue. Negative for chills, diaphoresis and fever.   HENT:  Positive for congestion, ear pain, postnasal drip, rhinorrhea, sinus pressure and sinus pain. Negative for sore throat.    Respiratory: Negative.     Cardiovascular: Negative.    Gastrointestinal: Negative.    Musculoskeletal:  Positive for arthralgias and myalgias.   Neurological:  Positive for headaches.   All other systems reviewed and are

## 2024-05-30 ENCOUNTER — TELEPHONE (OUTPATIENT)
Dept: FAMILY MEDICINE CLINIC | Age: 59
End: 2024-05-30

## 2024-05-30 DIAGNOSIS — R09.81 SINUS CONGESTION: ICD-10-CM

## 2024-05-30 DIAGNOSIS — H92.09 OTALGIA, UNSPECIFIED LATERALITY: Primary | ICD-10-CM

## 2024-05-30 NOTE — TELEPHONE ENCOUNTER
Spoke to pt and he has been seen recently for sinusitis and his symptoms aren't much better. Still c/o ear pain and sinus congestion. Requesting referral to Dr. Arambula. OK for referral? Please advise.    If agreeable to the referral it is OK to fax it to Dr. Arambula and the pt knows they will contact him to schedule.

## 2024-05-30 NOTE — TELEPHONE ENCOUNTER
----- Message from Donta Duncan sent at 5/30/2024 12:32 PM EDT -----  Regarding: E[facility] Referral Request  E[facility] Referral Request    Reason for referral request: Specialty Provider: ENT Specialist    Specialist/Lab/Test patient is requesting (if known):    Specialist Phone Number (if applicable):    Additional Information The patient is requesting for a referral to his PCP Neli Chew in regards to scheduling an appointment with a specialist/ ENT Specialist    --------------------------------------------------------------------------------------------------------------------------    Relationship to Patient: Self     Call Back Information: OK to leave message on voicemail  Preferred Call Back Number: Phone  [phone]

## 2024-06-03 DIAGNOSIS — I10 ESSENTIAL HYPERTENSION: ICD-10-CM

## 2024-06-03 DIAGNOSIS — Z00.00 ANNUAL PHYSICAL EXAM: Primary | ICD-10-CM

## 2024-06-03 DIAGNOSIS — F41.9 ANXIETY: ICD-10-CM

## 2024-06-03 DIAGNOSIS — E34.9 TESTOSTERONE DEFICIENCY: ICD-10-CM

## 2024-06-03 DIAGNOSIS — R79.89 LOW TESTOSTERONE: Primary | ICD-10-CM

## 2024-06-03 DIAGNOSIS — N40.1 BENIGN LOCALIZED PROSTATIC HYPERPLASIA WITH LOWER URINARY TRACT SYMPTOMS (LUTS): ICD-10-CM

## 2024-06-03 DIAGNOSIS — R73.01 IFG (IMPAIRED FASTING GLUCOSE): ICD-10-CM

## 2024-06-03 DIAGNOSIS — E78.2 MIXED HYPERLIPIDEMIA: ICD-10-CM

## 2024-06-03 RX ORDER — ESCITALOPRAM OXALATE 10 MG/1
10 TABLET ORAL DAILY
Qty: 90 TABLET | Refills: 3 | Status: SHIPPED | OUTPATIENT
Start: 2024-06-03

## 2024-06-03 RX ORDER — LOSARTAN POTASSIUM AND HYDROCHLOROTHIAZIDE 12.5; 1 MG/1; MG/1
TABLET ORAL
Qty: 90 TABLET | Refills: 3 | Status: SHIPPED | OUTPATIENT
Start: 2024-06-03

## 2024-06-03 RX ORDER — SIMVASTATIN 40 MG
40 TABLET ORAL NIGHTLY
Qty: 90 TABLET | Refills: 3 | Status: SHIPPED | OUTPATIENT
Start: 2024-06-03

## 2024-06-03 NOTE — TELEPHONE ENCOUNTER
This medication refill is regarding a electronic request. Refill requested by patient.    Requested Prescriptions     Pending Prescriptions Disp Refills    simvastatin (ZOCOR) 40 MG tablet [Pharmacy Med Name: SIMVASTATIN 40 MG TABLET] 90 tablet 3     Sig: TAKE 1 TABLET BY MOUTH NIGHTLY    escitalopram (LEXAPRO) 10 MG tablet [Pharmacy Med Name: ESCITALOPRAM 10 MG TABLET] 90 tablet 3     Sig: TAKE 1 TABLET BY MOUTH EVERY DAY    losartan-hydroCHLOROthiazide (HYZAAR) 100-12.5 MG per tablet [Pharmacy Med Name: LOSARTAN-HCTZ 100-12.5 MG TAB] 90 tablet 3     Sig: TAKE 1 TABLET BY MOUTH EVERY DAY       Date of last visit: 5/15/2024   Date of next visit: Visit date not found  Date of last refill:       Last Lipid Panel:    Lab Results   Component Value Date/Time    CHOL 224 04/25/2023 10:50 AM    TRIG 280 08/21/2023 12:26 PM    HDL 40 04/25/2023 10:50 AM       Rx verified, ordered and set to EP.

## 2024-06-03 NOTE — TELEPHONE ENCOUNTER
Rx EP'd to pharmacy.  Please notify patient.      Requested Prescriptions     Signed Prescriptions Disp Refills    simvastatin (ZOCOR) 40 MG tablet 90 tablet 3     Sig: TAKE 1 TABLET BY MOUTH NIGHTLY     Authorizing Provider: BENJAMÍN CHEW    escitalopram (LEXAPRO) 10 MG tablet 90 tablet 3     Sig: TAKE 1 TABLET BY MOUTH EVERY DAY     Authorizing Provider: BENJAMÍN CHEW    losartan-hydroCHLOROthiazide (HYZAAR) 100-12.5 MG per tablet 90 tablet 3     Sig: TAKE 1 TABLET BY MOUTH EVERY DAY     Authorizing Provider: BENJAMÍN CHEW     Due for labs.  Orders pended.       Electronically signed by Benjamín Chew MD on 6/3/2024 at 10:13 AM

## 2024-06-10 NOTE — TELEPHONE ENCOUNTER
Letter and labs printed and placed in envelope up front to be mailed to pt letting him know he needs to complete labs at earliest convenience after 12 hour fast.

## 2024-06-13 ENCOUNTER — OFFICE VISIT (OUTPATIENT)
Dept: FAMILY MEDICINE CLINIC | Age: 59
End: 2024-06-13
Payer: COMMERCIAL

## 2024-06-13 VITALS
RESPIRATION RATE: 16 BRPM | WEIGHT: 248 LBS | BODY MASS INDEX: 36.62 KG/M2 | HEART RATE: 84 BPM | DIASTOLIC BLOOD PRESSURE: 72 MMHG | TEMPERATURE: 98.2 F | SYSTOLIC BLOOD PRESSURE: 128 MMHG

## 2024-06-13 DIAGNOSIS — Z00.00 ANNUAL PHYSICAL EXAM: Primary | ICD-10-CM

## 2024-06-13 DIAGNOSIS — I10 ESSENTIAL HYPERTENSION: ICD-10-CM

## 2024-06-13 PROCEDURE — 3074F SYST BP LT 130 MM HG: CPT | Performed by: FAMILY MEDICINE

## 2024-06-13 PROCEDURE — 99396 PREV VISIT EST AGE 40-64: CPT | Performed by: FAMILY MEDICINE

## 2024-06-13 PROCEDURE — 3078F DIAST BP <80 MM HG: CPT | Performed by: FAMILY MEDICINE

## 2024-06-13 ASSESSMENT — ENCOUNTER SYMPTOMS
CHEST TIGHTNESS: 0
SHORTNESS OF BREATH: 0
ABDOMINAL PAIN: 0
EYES NEGATIVE: 1
BLOOD IN STOOL: 0

## 2024-06-13 NOTE — PROGRESS NOTES
distress.     Appearance: He is well-developed.   HENT:      Head: Normocephalic and atraumatic.      Right Ear: Tympanic membrane normal.      Left Ear: Tympanic membrane normal.      Mouth/Throat:      Mouth: Mucous membranes are moist.      Pharynx: No posterior oropharyngeal erythema.   Eyes:      Conjunctiva/sclera: Conjunctivae normal.   Neck:      Thyroid: No thyromegaly.      Vascular: No carotid bruit.   Cardiovascular:      Rate and Rhythm: Normal rate and regular rhythm.      Heart sounds: No murmur heard.  Pulmonary:      Effort: Pulmonary effort is normal.      Breath sounds: Normal breath sounds. No wheezing.   Abdominal:      General: Bowel sounds are normal.      Palpations: Abdomen is soft.      Tenderness: There is no abdominal tenderness.   Musculoskeletal:      Cervical back: Neck supple.      Right lower leg: No edema.      Left lower leg: No edema.   Lymphadenopathy:      Cervical: No cervical adenopathy.   Skin:     General: Skin is warm and dry.      Findings: No rash.   Neurological:      Mental Status: He is alert and oriented to person, place, and time.   Psychiatric:         Behavior: Behavior normal.             Immunization History   Administered Date(s) Administered    Influenza A (H5N1) Monovalent Vaccine, Adjuvanted-2013 12/06/2017    Influenza Virus Vaccine 10/27/2021    Influenza, FLUARIX, FLULAVAL, FLUZONE (age 6 mo+) AND AFLURIA, (age 3 y+), PF, 0.5mL 10/27/2021, 10/17/2022, 10/20/2023    Influenza, FLUCELVAX, (age 6 mo+), MDCK, PF, 0.5mL 12/06/2017, 10/18/2020    TDaP, ADACEL (age 10y-64y), BOOSTRIX (age 10y+), IM, 0.5mL 07/01/2019    Zoster Recombinant (Shingrix) 10/17/2022, 12/29/2022       Health Maintenance   Topic Date Due    Hepatitis B vaccine (1 of 3 - 3-dose series) Never done    COVID-19 Vaccine (1) Never done    Pneumococcal 0-64 years Vaccine (1 of 2 - PCV) Never done    HIV screen  Never done    Lipids  04/25/2024    A1C test (Diabetic or Prediabetic)  08/21/2024

## 2024-06-14 ENCOUNTER — NURSE ONLY (OUTPATIENT)
Dept: LAB | Age: 59
End: 2024-06-14

## 2024-06-14 DIAGNOSIS — R73.01 IFG (IMPAIRED FASTING GLUCOSE): ICD-10-CM

## 2024-06-14 DIAGNOSIS — Z00.00 ANNUAL PHYSICAL EXAM: ICD-10-CM

## 2024-06-14 LAB
ALBUMIN SERPL BCG-MCNC: 4.2 G/DL (ref 3.5–5.1)
ALP SERPL-CCNC: 93 U/L (ref 38–126)
ALT SERPL W/O P-5'-P-CCNC: 23 U/L (ref 11–66)
ANION GAP SERPL CALC-SCNC: 6 MEQ/L (ref 8–16)
AST SERPL-CCNC: 23 U/L (ref 5–40)
BILIRUB SERPL-MCNC: 0.3 MG/DL (ref 0.3–1.2)
BUN SERPL-MCNC: 16 MG/DL (ref 7–22)
CALCIUM SERPL-MCNC: 9 MG/DL (ref 8.5–10.5)
CHLORIDE SERPL-SCNC: 105 MEQ/L (ref 98–111)
CHOLEST SERPL-MCNC: 142 MG/DL (ref 100–199)
CO2 SERPL-SCNC: 28 MEQ/L (ref 23–33)
CREAT SERPL-MCNC: 0.8 MG/DL (ref 0.4–1.2)
DEPRECATED MEAN GLUCOSE BLD GHB EST-ACNC: 117 MG/DL (ref 70–126)
GFR SERPL CREATININE-BSD FRML MDRD: > 90 ML/MIN/1.73M2
GLUCOSE SERPL-MCNC: 105 MG/DL (ref 70–108)
HBA1C MFR BLD HPLC: 5.9 % (ref 4.4–6.4)
HDLC SERPL-MCNC: 40 MG/DL
LDLC SERPL CALC-MCNC: 68 MG/DL
POTASSIUM SERPL-SCNC: 4.5 MEQ/L (ref 3.5–5.2)
PROT SERPL-MCNC: 6.6 G/DL (ref 6.1–8)
SODIUM SERPL-SCNC: 139 MEQ/L (ref 135–145)
TRIGL SERPL-MCNC: 172 MG/DL (ref 0–199)

## 2024-07-03 ENCOUNTER — NURSE ONLY (OUTPATIENT)
Dept: LAB | Age: 59
End: 2024-07-03

## 2024-07-03 DIAGNOSIS — N40.1 BENIGN LOCALIZED PROSTATIC HYPERPLASIA WITH LOWER URINARY TRACT SYMPTOMS (LUTS): ICD-10-CM

## 2024-07-03 DIAGNOSIS — E34.9 TESTOSTERONE DEFICIENCY: ICD-10-CM

## 2024-07-03 DIAGNOSIS — R79.89 LOW TESTOSTERONE: ICD-10-CM

## 2024-07-03 LAB
ALBUMIN SERPL BCG-MCNC: 4.5 G/DL (ref 3.5–5.1)
ALP SERPL-CCNC: 113 U/L (ref 38–126)
ALT SERPL W/O P-5'-P-CCNC: 31 U/L (ref 11–66)
AST SERPL-CCNC: 39 U/L (ref 5–40)
BILIRUB CONJ SERPL-MCNC: < 0.1 MG/DL (ref 0.1–13.8)
BILIRUB SERPL-MCNC: 0.3 MG/DL (ref 0.3–1.2)
HCT VFR BLD AUTO: 49.3 % (ref 42–52)
HGB BLD-MCNC: 15.1 GM/DL (ref 14–18)
PROT SERPL-MCNC: 6.9 G/DL (ref 6.1–8)
PSA SERPL-MCNC: 1.07 NG/ML (ref 0–1)

## 2024-07-05 LAB — TESTOST SERPL-MCNC: 58 NG/DL (ref 300–890)

## 2024-07-11 ENCOUNTER — OFFICE VISIT (OUTPATIENT)
Dept: UROLOGY | Age: 59
End: 2024-07-11
Payer: COMMERCIAL

## 2024-07-11 VITALS
DIASTOLIC BLOOD PRESSURE: 70 MMHG | SYSTOLIC BLOOD PRESSURE: 128 MMHG | HEIGHT: 69 IN | WEIGHT: 242 LBS | BODY MASS INDEX: 35.84 KG/M2

## 2024-07-11 DIAGNOSIS — R79.89 LOW TESTOSTERONE: Primary | ICD-10-CM

## 2024-07-11 DIAGNOSIS — N40.0 BENIGN PROSTATIC HYPERPLASIA, UNSPECIFIED WHETHER LOWER URINARY TRACT SYMPTOMS PRESENT: ICD-10-CM

## 2024-07-11 PROCEDURE — 99214 OFFICE O/P EST MOD 30 MIN: CPT | Performed by: UROLOGY

## 2024-07-11 PROCEDURE — 3078F DIAST BP <80 MM HG: CPT | Performed by: UROLOGY

## 2024-07-11 PROCEDURE — 3074F SYST BP LT 130 MM HG: CPT | Performed by: UROLOGY

## 2024-07-11 RX ORDER — SYRINGE W-NEEDLE,DISPOSAB,3 ML 25GX5/8"
SYRINGE, EMPTY DISPOSABLE MISCELLANEOUS
Qty: 3 EACH | Refills: 5 | Status: SHIPPED | OUTPATIENT
Start: 2024-07-11

## 2024-07-11 RX ORDER — TAMSULOSIN HYDROCHLORIDE 0.4 MG/1
0.4 CAPSULE ORAL DAILY
Qty: 90 CAPSULE | Refills: 3 | Status: SHIPPED | OUTPATIENT
Start: 2024-07-11

## 2024-07-11 RX ORDER — TESTOSTERONE CYPIONATE 200 MG/ML
200 INJECTION, SOLUTION INTRAMUSCULAR
Qty: 12 ML | Refills: 0 | Status: SHIPPED | OUTPATIENT
Start: 2024-07-11 | End: 2025-01-07

## 2024-07-11 NOTE — PROGRESS NOTES
TRINI JIMENEZ MD        [facility]  [facility]  [address]  [address]  [facility] [address]  Dept: [phone]  Dept Fax: [phone]  Loc: [phone]      [facility] Urology Office Note -     Patient:  Ryan Gaming  YOB: 1965    The patient is a 58 y.o. male who presents today for evaluation of the following problems:   Chief Complaint   Patient presents with    Hypogonadism    Follow-up     6 month follow up    Results     Labs done prior        HISTORY OF PRESENT ILLNESS:       hypogonadism  Testosterone every 14 days  Here to discuss labwork  Does donate blood    bph- stable. Doing well on flomax as needed        Summary of Previous Records:  Ryan Gaming f/u today for Testosterone Deficiency. His most recent Testosterone level was 987 on 07/2019. This was obtained mid- cycle.   He is currently on Testosterone injections 200 mg every 14 days.   He does report improvement in symptoms of sex drive, energy, fatigue and erections. Denies any side effects to the injections, feels sluggish a few days before next injection.   Has sleep apnea, well controlled with CPAP machine.   Trend of PSA:  0.73 07/2019  0.68 07/15/17  0.37 07/2016  Currently takes Flomax PRN, has intermittent weak stream  Denies any issues with ED  Ryan comes in by himself . Hx is obtained from the patient and the medical record.      Requested/reviewed records from Neli Chew MD office and/or outside physician/EMR    (Patient's old records have been requested, reviewed and pertinent findings summarized in today's note.)    Procedures Today: N/A    Last several PSA's:  Lab Results   Component Value Date    PSA 1.07 (H) 07/03/2024    PSA 0.91 09/21/2023    PSA 1.08 (H) 03/01/2023       Last total testosterone:  Lab Results   Component Value Date    TESTOSTERONE 58 (L) 07/03/2024       Urinalysis today:  No results found for this visit on 07/11/24.    Last BUN

## 2024-07-23 ENCOUNTER — OFFICE VISIT (OUTPATIENT)
Dept: FAMILY MEDICINE CLINIC | Age: 59
End: 2024-07-23
Payer: COMMERCIAL

## 2024-07-23 ENCOUNTER — TELEPHONE (OUTPATIENT)
Dept: FAMILY MEDICINE CLINIC | Age: 59
End: 2024-07-23

## 2024-07-23 VITALS
BODY MASS INDEX: 35.84 KG/M2 | SYSTOLIC BLOOD PRESSURE: 118 MMHG | DIASTOLIC BLOOD PRESSURE: 76 MMHG | TEMPERATURE: 98.1 F | RESPIRATION RATE: 12 BRPM | HEART RATE: 108 BPM | WEIGHT: 242.8 LBS

## 2024-07-23 DIAGNOSIS — M72.2 PLANTAR FASCIITIS OF LEFT FOOT: ICD-10-CM

## 2024-07-23 DIAGNOSIS — R79.89 LOW TESTOSTERONE: ICD-10-CM

## 2024-07-23 DIAGNOSIS — R35.0 URINARY FREQUENCY: Primary | ICD-10-CM

## 2024-07-23 LAB
BILIRUBIN, URINE: NEGATIVE
BLOOD URINE, POC: NEGATIVE
CHARACTER, URINE: CLEAR
COLOR, UA: YELLOW
GLUCOSE URINE: NEGATIVE MG/DL
KETONES, URINE: NEGATIVE
LEUKOCYTE CLUMPS, URINE: NEGATIVE
NITRITE, URINE: NEGATIVE
PH, URINE: 5.5 (ref 5–9)
PROTEIN, URINE: 100 MG/DL
SPECIFIC GRAVITY UA: >= 1.03 (ref 1–1.03)
UROBILINOGEN, URINE: 0.2 EU/DL (ref 0–1)

## 2024-07-23 PROCEDURE — 3078F DIAST BP <80 MM HG: CPT | Performed by: FAMILY MEDICINE

## 2024-07-23 PROCEDURE — 99214 OFFICE O/P EST MOD 30 MIN: CPT | Performed by: FAMILY MEDICINE

## 2024-07-23 PROCEDURE — 81003 URINALYSIS AUTO W/O SCOPE: CPT | Performed by: FAMILY MEDICINE

## 2024-07-23 PROCEDURE — 3074F SYST BP LT 130 MM HG: CPT | Performed by: FAMILY MEDICINE

## 2024-07-23 RX ORDER — NAPROXEN 500 MG/1
500 TABLET ORAL 2 TIMES DAILY WITH MEALS
Qty: 30 TABLET | Refills: 0 | Status: SHIPPED | OUTPATIENT
Start: 2024-07-23

## 2024-07-23 RX ORDER — SYRINGE W-NEEDLE,DISPOSAB,3 ML 25GX5/8"
SYRINGE, EMPTY DISPOSABLE MISCELLANEOUS
Qty: 24 EACH | Refills: 0 | Status: SHIPPED | OUTPATIENT
Start: 2024-07-23

## 2024-07-23 RX ORDER — CIPROFLOXACIN 500 MG/1
500 TABLET, FILM COATED ORAL 2 TIMES DAILY
Qty: 10 TABLET | Refills: 0 | Status: SHIPPED | OUTPATIENT
Start: 2024-07-23 | End: 2024-07-28

## 2024-07-23 NOTE — PROGRESS NOTES
2024    Ryan Gaming (:  1965) is a 58 y.o. male, Established patient, here for evaluation of the following chief complaint(s):  Urinary Frequency (Urinary frequency. H/o prostatitis. )      ASSESSMENT/PLAN:      1. Urinary frequency  -     POCT Urinalysis No Micro (Auto)  -     ciprofloxacin (CIPRO) 500 MG tablet; Take 1 tablet by mouth 2 times daily for 5 days, Disp-10 tablet, R-0Normal  2. Plantar fasciitis of left foot  -     naproxen (NAPROSYN) 500 MG tablet; Take 1 tablet by mouth 2 times daily (with meals), Disp-30 tablet, R-0Normal  3. Low testosterone  -     Syringe/Needle, Disp, (SYRINGE 3CC/23GX1\") 23G X 1\" 3 ML MISC; Disp-24 each, R-0, NormalUse every 14 days for testosterone injections      Treat with Cipro 500 mg p.o. twice daily x 5 days.  Patient is convinced that he may have prostatitis but he does not have the classic symptoms for this condition.  Cipro has worked well for him in the past.    He is encouraged to drink more fluid, as his urine today is concentrated    Ice, stretching, and Naprosyn 500 mg p.o. twice daily with meals for plantar fasciitis of the left foot.  Podiatry evaluation if not improved.    Prescription issued for syringes and needles as above for administration of his testosterone injections    Follow-up if not better    SUBJECTIVE/OBJECTIVE:    HPI    Patient today with urinary frequency and concern about possible prostatitis.  He has had prostatitis in the past that is responded well to Cipro.  He states that his symptoms this time feel the same.  He denies any pelvic pain, fever, chills, or sweats.  He does complain of mild back pain.  Symptoms have been present for the last 1 to 2 days.  No vomiting or diarrhea.  No gross hematuria.  Additionally, he complains of some pain in the heel of his left foot.  No known injury.  No new issues recently.  He is wondering what he may do to treat this condition as well.  He also requests a refill of syringes

## 2024-07-23 NOTE — TELEPHONE ENCOUNTER
C/O urinary frequency x 4 days and thinks that he has prostatitis. No fever states, he feels good. Taking Flomax since started with symptoms. [facility]ing to [facility] Pt saw Dr[facility] Iraheta ([facility] urology) on 7/11/24 so I suggested to the pt he can call there with his concerns and he wanted me to check with CG first to see if she would just prescribe an antibiotic. Please advise.

## 2024-07-24 ASSESSMENT — ENCOUNTER SYMPTOMS
BACK PAIN: 1
ABDOMINAL PAIN: 0
RESPIRATORY NEGATIVE: 1

## 2024-10-01 ENCOUNTER — TELEPHONE (OUTPATIENT)
Dept: UROLOGY | Age: 59
End: 2024-10-01

## 2024-10-01 DIAGNOSIS — R79.89 LOW TESTOSTERONE: ICD-10-CM

## 2024-10-01 RX ORDER — SYRINGE W-NEEDLE,DISPOSAB,3 ML 25GX5/8"
SYRINGE, EMPTY DISPOSABLE MISCELLANEOUS
Qty: 25 EACH | Refills: 1 | Status: SHIPPED | OUTPATIENT
Start: 2024-10-01

## 2024-10-01 RX ORDER — TESTOSTERONE CYPIONATE 200 MG/ML
200 INJECTION, SOLUTION INTRAMUSCULAR
Qty: 6 ML | Refills: 2 | Status: SHIPPED | OUTPATIENT
Start: 2024-10-01 | End: 2025-06-10

## 2024-10-01 NOTE — TELEPHONE ENCOUNTER
Ryan Gaming called requesting a refill on the following medications:  Requested Prescriptions     Pending Prescriptions Disp Refills    testosterone cypionate (DEPOTESTOTERONE CYPIONATE) 200 MG/ML injection 12 mL 0     Sig: Inject 1 mL into the muscle every 14 days for 180 days. Max Daily Amount: 200 mg    Syringe/Needle, Disp, (SYRINGE 3CC/23GX1\") 23G X 1\" 3 ML MISC 24 each 0     Sig: Use every 14 days for testosterone injections     Pharmacy verified:  .[email or web address]      Date of last visit: 07/11/2023  Date of next visit (if applicable): 1/9/2025

## 2025-01-14 ENCOUNTER — OFFICE VISIT (OUTPATIENT)
Dept: FAMILY MEDICINE CLINIC | Age: 60
End: 2025-01-14
Payer: COMMERCIAL

## 2025-01-14 VITALS
RESPIRATION RATE: 16 BRPM | WEIGHT: 265.2 LBS | HEIGHT: 69 IN | TEMPERATURE: 98.3 F | DIASTOLIC BLOOD PRESSURE: 68 MMHG | BODY MASS INDEX: 39.28 KG/M2 | SYSTOLIC BLOOD PRESSURE: 130 MMHG | HEART RATE: 80 BPM

## 2025-01-14 DIAGNOSIS — J01.00 ACUTE NON-RECURRENT MAXILLARY SINUSITIS: Primary | ICD-10-CM

## 2025-01-14 PROCEDURE — 3075F SYST BP GE 130 - 139MM HG: CPT | Performed by: STUDENT IN AN ORGANIZED HEALTH CARE EDUCATION/TRAINING PROGRAM

## 2025-01-14 PROCEDURE — 99213 OFFICE O/P EST LOW 20 MIN: CPT | Performed by: STUDENT IN AN ORGANIZED HEALTH CARE EDUCATION/TRAINING PROGRAM

## 2025-01-14 PROCEDURE — 3078F DIAST BP <80 MM HG: CPT | Performed by: STUDENT IN AN ORGANIZED HEALTH CARE EDUCATION/TRAINING PROGRAM

## 2025-01-14 RX ORDER — DOXYCYCLINE HYCLATE 100 MG
100 TABLET ORAL 2 TIMES DAILY
Qty: 14 TABLET | Refills: 0 | Status: SHIPPED | OUTPATIENT
Start: 2025-01-14 | End: 2025-01-21

## 2025-01-14 SDOH — ECONOMIC STABILITY: FOOD INSECURITY: WITHIN THE PAST 12 MONTHS, THE FOOD YOU BOUGHT JUST DIDN'T LAST AND YOU DIDN'T HAVE MONEY TO GET MORE.: NEVER TRUE

## 2025-01-14 SDOH — ECONOMIC STABILITY: FOOD INSECURITY: WITHIN THE PAST 12 MONTHS, YOU WORRIED THAT YOUR FOOD WOULD RUN OUT BEFORE YOU GOT MONEY TO BUY MORE.: NEVER TRUE

## 2025-01-14 ASSESSMENT — PATIENT HEALTH QUESTIONNAIRE - PHQ9
SUM OF ALL RESPONSES TO PHQ9 QUESTIONS 1 & 2: 0
SUM OF ALL RESPONSES TO PHQ QUESTIONS 1-9: 0
SUM OF ALL RESPONSES TO PHQ QUESTIONS 1-9: 0
2. FEELING DOWN, DEPRESSED OR HOPELESS: NOT AT ALL
SUM OF ALL RESPONSES TO PHQ QUESTIONS 1-9: 0
1. LITTLE INTEREST OR PLEASURE IN DOING THINGS: NOT AT ALL
SUM OF ALL RESPONSES TO PHQ QUESTIONS 1-9: 0

## 2025-01-14 ASSESSMENT — ENCOUNTER SYMPTOMS
CONSTIPATION: 0
SINUS PAIN: 1
SHORTNESS OF BREATH: 0
SORE THROAT: 1
COUGH: 1
SINUS PRESSURE: 1
NAUSEA: 0
VOMITING: 0

## 2025-01-14 NOTE — PROGRESS NOTES
Health Maintenance Due   Topic Date Due    HIV screen  Never done    COVID-19 Vaccine (1 - 2023-24 season) Never done

## 2025-01-15 DIAGNOSIS — R79.89 LOW TESTOSTERONE: Primary | ICD-10-CM

## 2025-01-16 ENCOUNTER — LAB (OUTPATIENT)
Dept: LAB | Age: 60
End: 2025-01-16

## 2025-01-16 DIAGNOSIS — R79.89 LOW TESTOSTERONE: ICD-10-CM

## 2025-01-16 LAB
ALBUMIN SERPL BCG-MCNC: 4 G/DL (ref 3.5–5.1)
ALP SERPL-CCNC: 105 U/L (ref 38–126)
ALT SERPL W/O P-5'-P-CCNC: 41 U/L (ref 11–66)
AST SERPL-CCNC: 33 U/L (ref 5–40)
BILIRUB CONJ SERPL-MCNC: < 0.1 MG/DL (ref 0.1–13.8)
BILIRUB SERPL-MCNC: < 0.2 MG/DL (ref 0.3–1.2)
HCT VFR BLD AUTO: 46.5 % (ref 42–52)
HGB BLD-MCNC: 15.5 GM/DL (ref 14–18)
PROT SERPL-MCNC: 6.6 G/DL (ref 6.1–8)

## 2025-01-17 LAB
PSA SERPL-MCNC: 0.81 NG/ML (ref 0–1)
SHBG SERPL-SCNC: 21 NMOL/L (ref 19–76)
TESTOST FREE MFR SERPL: 27 PG/ML (ref 47–244)
TESTOST SERPL-MCNC: 115 NG/DL (ref 193–740)

## 2025-01-21 ENCOUNTER — TELEPHONE (OUTPATIENT)
Dept: FAMILY MEDICINE CLINIC | Age: 60
End: 2025-01-21

## 2025-01-21 DIAGNOSIS — J01.00 ACUTE NON-RECURRENT MAXILLARY SINUSITIS: ICD-10-CM

## 2025-01-21 DIAGNOSIS — R05.1 ACUTE COUGH: Primary | ICD-10-CM

## 2025-01-21 RX ORDER — BENZONATATE 200 MG/1
200 CAPSULE ORAL 3 TIMES DAILY PRN
Qty: 30 CAPSULE | Refills: 0 | Status: SHIPPED | OUTPATIENT
Start: 2025-01-21 | End: 2025-01-31

## 2025-01-21 RX ORDER — PREDNISONE 20 MG/1
40 TABLET ORAL DAILY
Qty: 10 TABLET | Refills: 0 | Status: SHIPPED | OUTPATIENT
Start: 2025-01-21 | End: 2025-01-26

## 2025-01-21 NOTE — TELEPHONE ENCOUNTER
Prednisone 40 mg daily x5 days, sent to pharmacy. Try tessalon with this, or he can try Coricidin HBP OTC for additional cough benefit. If no improvement, recommend re-evaluation in office to listen to lungs. Thanks!

## 2025-01-21 NOTE — TELEPHONE ENCOUNTER
Pt called stating that he is still coughing really bad, would like something for that. Was seen 1/14/25.    Please advise

## 2025-01-21 NOTE — TELEPHONE ENCOUNTER
I called pt to let him know about the rx sent to the pharmacy. He stated tessalon doesn't work for him. He is asking for a steroid and cough syrup

## 2025-01-22 ENCOUNTER — OFFICE VISIT (OUTPATIENT)
Dept: UROLOGY | Age: 60
End: 2025-01-22
Payer: COMMERCIAL

## 2025-01-22 VITALS — WEIGHT: 261.2 LBS | RESPIRATION RATE: 16 BRPM | BODY MASS INDEX: 38.69 KG/M2 | HEIGHT: 69 IN

## 2025-01-22 DIAGNOSIS — N40.1 BENIGN LOCALIZED PROSTATIC HYPERPLASIA WITH LOWER URINARY TRACT SYMPTOMS (LUTS): ICD-10-CM

## 2025-01-22 DIAGNOSIS — R79.89 LOW TESTOSTERONE: Primary | ICD-10-CM

## 2025-01-22 DIAGNOSIS — R68.82 DECREASED LIBIDO: ICD-10-CM

## 2025-01-22 DIAGNOSIS — R53.83 LOW ENERGY: ICD-10-CM

## 2025-01-22 PROCEDURE — 99214 OFFICE O/P EST MOD 30 MIN: CPT

## 2025-01-22 RX ORDER — SYRINGE W-NEEDLE,DISPOSAB,3 ML 25GX5/8"
SYRINGE, EMPTY DISPOSABLE MISCELLANEOUS
Qty: 10 EACH | Refills: 5 | Status: SHIPPED | OUTPATIENT
Start: 2025-01-22

## 2025-01-22 RX ORDER — NEEDLES, DISPOSABLE 25GX5/8"
1 NEEDLE, DISPOSABLE MISCELLANEOUS
Qty: 10 EACH | Refills: 5 | Status: SHIPPED | OUTPATIENT
Start: 2025-01-22

## 2025-01-22 RX ORDER — TESTOSTERONE CYPIONATE 200 MG/ML
200 INJECTION, SOLUTION INTRAMUSCULAR
Qty: 4 ML | Refills: 3 | Status: SHIPPED | OUTPATIENT
Start: 2025-01-22 | End: 2025-09-03

## 2025-01-22 NOTE — PROGRESS NOTES
[facility] [place] [facility]  [facility] UROLOG[facility]  [address]  [address]  [facility] [place] [address]  Dept: [phone]  Loc: [phone]  Visit Date: 1/22/2025      HPI  Ryan Gaming is a 59 y.o. male that presents to the urology clinic for low testosterone follow-up.    Hypogonadism in Male  Symptomatic for low energy, decreased libido at baseline. Now managed on Testosterone Cypionate 200 mg/mL, 1 mL IM every 14 days. Symptoms well controlled.    No accompanying erectile dysfunction.    BPH w/ LUTS  Managed on Flomax 0.4 mg PRN. Only taking in emergent cases... prefers not to take daily.    +Family history of prostate cancer in paternal grandfather. PSA WNL. No acute elevation or prior prostate imaging or biopsies in the past.      Most Recent PSA: 0.81 (01/16/25)      Last Total Testosterone:  Lab Results   Component Value Date    TESTOSTERONE 115 (L) 01/16/2025         Last BUN and Creatinine:  Lab Results   Component Value Date    BUN 16 06/14/2024     Lab Results   Component Value Date    CREATININE 0.8 06/14/2024           PAST MEDICAL, FAMILY AND SOCIAL HISTORY UPDATE:  Past Medical History:   Diagnosis Date    Allergic rhinitis     BPH (benign prostatic hyperplasia)     Hyperlipidemia     Hypertension     Hypogonadism 9/12/2011    Hypogonadism male     Inflammatory spondylopathies (HCC)     Testosterone deficiency      Past Surgical History:   Procedure Laterality Date    BACK SURGERY  08/05/2018    CARPAL TUNNEL RELEASE Left 2015    CERVICAL SPINE SURGERY  06/01/2015    Herniated disc    COLONOSCOPY  2011    ELBOW SURGERY  08/2011    Dr. Vicente-right elbow    HIP SURGERY Right 12/17/2023    New Site    INGUINAL HERNIA REPAIR Right 07/11/2023    KNEE SURGERY  09/01/2020    LUMBAR FUSION  08/2017    L5-S1, Dr. Cole    LUMBAR FUSION  12/17/2021    OTHER SURGICAL HISTORY  age 16    benign masses removed from chest    SINUS SURGERY      UMBILICAL HERNIA REPAIR N/A 07/11/2023

## 2025-06-25 DIAGNOSIS — E78.2 MIXED HYPERLIPIDEMIA: ICD-10-CM

## 2025-06-25 DIAGNOSIS — I10 ESSENTIAL HYPERTENSION: ICD-10-CM

## 2025-06-25 DIAGNOSIS — F41.9 ANXIETY: ICD-10-CM

## 2025-06-25 RX ORDER — ESCITALOPRAM OXALATE 10 MG/1
10 TABLET ORAL DAILY
Qty: 30 TABLET | Refills: 0 | Status: SHIPPED | OUTPATIENT
Start: 2025-06-25

## 2025-06-25 RX ORDER — LOSARTAN POTASSIUM AND HYDROCHLOROTHIAZIDE 12.5; 1 MG/1; MG/1
TABLET ORAL
Qty: 30 TABLET | Refills: 0 | Status: SHIPPED | OUTPATIENT
Start: 2025-06-25

## 2025-06-25 RX ORDER — SIMVASTATIN 40 MG
40 TABLET ORAL NIGHTLY
Qty: 30 TABLET | Refills: 0 | Status: SHIPPED | OUTPATIENT
Start: 2025-06-25

## 2025-07-02 DIAGNOSIS — I10 ESSENTIAL HYPERTENSION: ICD-10-CM

## 2025-07-02 DIAGNOSIS — F41.9 ANXIETY: ICD-10-CM

## 2025-07-02 DIAGNOSIS — E78.2 MIXED HYPERLIPIDEMIA: ICD-10-CM

## 2025-07-02 RX ORDER — LOSARTAN POTASSIUM AND HYDROCHLOROTHIAZIDE 12.5; 1 MG/1; MG/1
1 TABLET ORAL DAILY
Qty: 90 TABLET | Refills: 3 | OUTPATIENT
Start: 2025-07-02

## 2025-07-02 RX ORDER — SIMVASTATIN 40 MG
40 TABLET ORAL NIGHTLY
Qty: 90 TABLET | Refills: 3 | OUTPATIENT
Start: 2025-07-02

## 2025-07-02 RX ORDER — ESCITALOPRAM OXALATE 10 MG/1
10 TABLET ORAL DAILY
Qty: 90 TABLET | Refills: 3 | OUTPATIENT
Start: 2025-07-02

## 2025-07-02 NOTE — TELEPHONE ENCOUNTER
Pt called to schedule physical and med refill. TS and CG are booked out until July and August so pt will be out of meds before appointment. Pended short term supply to get him through until appointment.   This medication refill is regarding a electronic request. Refill requested by patient.    Requested Prescriptions     Signed Prescriptions Disp Refills    escitalopram (LEXAPRO) 10 MG tablet 30 tablet 0     Sig: Take 1 tablet by mouth daily     Authorizing Provider: SABINO LUCIANO    losartan-hydroCHLOROthiazide (HYZAAR) 100-12.5 MG per tablet 30 tablet 0     Sig: TAKE 1 TABLET BY MOUTH EVERY DAY     Authorizing Provider: SABINO LUCIANO    simvastatin (ZOCOR) 40 MG tablet 30 tablet 0     Sig: Take 1 tablet by mouth nightly     Authorizing Provider: SABINO LUCIANO     Date of last visit: 1/14/2025   Date of next visit: 7/2/2025  Last Lipid Panel:    Lab Results   Component Value Date/Time    CHOL 142 06/14/2024 09:25 AM    TRIG 172 06/14/2024 09:25 AM    HDL 40 06/14/2024 09:25 AM     Last CMP:   Lab Results   Component Value Date     06/14/2024    K 4.5 06/14/2024     06/14/2024    CO2 28 06/14/2024    BUN 16 06/14/2024    CREATININE 0.8 06/14/2024    GLUCOSE 105 06/14/2024    CALCIUM 9.0 06/14/2024    BILITOT <0.2 (L) 01/16/2025    ALKPHOS 105 01/16/2025    AST 33 01/16/2025    ALT 41 01/16/2025    LABGLOM > 90 06/14/2024       Last Thyroid:    Lab Results   Component Value Date    TSH 1.710 07/29/2020    T4FREE 0.74 (L) 07/29/2020     Last Hemoglobin A1C:    Lab Results   Component Value Date/Time    LABA1C 5.9 06/14/2024 09:25 AM       Rx verified, ordered and set to EP.

## 2025-07-17 ENCOUNTER — LAB (OUTPATIENT)
Dept: LAB | Age: 60
End: 2025-07-17

## 2025-07-17 DIAGNOSIS — R79.89 LOW TESTOSTERONE: ICD-10-CM

## 2025-07-17 LAB
ALBUMIN SERPL BCG-MCNC: 4.1 G/DL (ref 3.4–4.9)
ALP SERPL-CCNC: 94 U/L (ref 40–129)
ALT SERPL W/O P-5'-P-CCNC: 36 U/L (ref 10–50)
AST SERPL-CCNC: 32 U/L (ref 10–50)
BILIRUB CONJ SERPL-MCNC: < 0.1 MG/DL (ref 0–0.2)
BILIRUB SERPL-MCNC: 0.3 MG/DL (ref 0.3–1.2)
HCT VFR BLD AUTO: 47.3 % (ref 42–52)
HGB BLD-MCNC: 14.7 GM/DL (ref 14–18)
PROT SERPL-MCNC: 6.6 G/DL (ref 6.4–8.3)

## 2025-07-18 LAB — TESTOST SERPL-MCNC: 1339 NG/DL (ref 193–740)

## 2025-07-22 ENCOUNTER — LAB (OUTPATIENT)
Dept: LAB | Age: 60
End: 2025-07-22

## 2025-07-22 ENCOUNTER — OFFICE VISIT (OUTPATIENT)
Dept: FAMILY MEDICINE CLINIC | Age: 60
End: 2025-07-22

## 2025-07-22 VITALS
RESPIRATION RATE: 16 BRPM | WEIGHT: 250.4 LBS | DIASTOLIC BLOOD PRESSURE: 74 MMHG | HEIGHT: 69 IN | BODY MASS INDEX: 37.09 KG/M2 | SYSTOLIC BLOOD PRESSURE: 126 MMHG | TEMPERATURE: 97.8 F | HEART RATE: 84 BPM

## 2025-07-22 DIAGNOSIS — I10 ESSENTIAL HYPERTENSION: ICD-10-CM

## 2025-07-22 DIAGNOSIS — J30.9 ALLERGIC RHINITIS, UNSPECIFIED SEASONALITY, UNSPECIFIED TRIGGER: ICD-10-CM

## 2025-07-22 DIAGNOSIS — E78.2 MIXED HYPERLIPIDEMIA: ICD-10-CM

## 2025-07-22 DIAGNOSIS — Z00.00 WELL ADULT EXAM: Primary | ICD-10-CM

## 2025-07-22 DIAGNOSIS — R73.01 IFG (IMPAIRED FASTING GLUCOSE): ICD-10-CM

## 2025-07-22 DIAGNOSIS — I10 PRIMARY HYPERTENSION: ICD-10-CM

## 2025-07-22 DIAGNOSIS — Z00.00 WELL ADULT EXAM: ICD-10-CM

## 2025-07-22 DIAGNOSIS — J34.89 SINUS PRESSURE: ICD-10-CM

## 2025-07-22 DIAGNOSIS — G47.33 OSA (OBSTRUCTIVE SLEEP APNEA): ICD-10-CM

## 2025-07-22 DIAGNOSIS — F41.9 ANXIETY: ICD-10-CM

## 2025-07-22 LAB
ALBUMIN SERPL BCG-MCNC: 4.2 G/DL (ref 3.4–4.9)
ALP SERPL-CCNC: 97 U/L (ref 40–129)
ALT SERPL W/O P-5'-P-CCNC: 35 U/L (ref 10–50)
ANION GAP SERPL CALC-SCNC: 12 MEQ/L (ref 8–16)
AST SERPL-CCNC: 31 U/L (ref 10–50)
BASOPHILS ABSOLUTE: 0.1 THOU/MM3 (ref 0–0.1)
BASOPHILS NFR BLD AUTO: 0.8 %
BILIRUB SERPL-MCNC: 0.5 MG/DL (ref 0.3–1.2)
BUN SERPL-MCNC: 12 MG/DL (ref 8–23)
CALCIUM SERPL-MCNC: 9.5 MG/DL (ref 8.8–10.2)
CHLORIDE SERPL-SCNC: 101 MEQ/L (ref 98–111)
CHOLEST SERPL-MCNC: 230 MG/DL (ref 100–199)
CO2 SERPL-SCNC: 26 MEQ/L (ref 22–29)
CREAT SERPL-MCNC: 1 MG/DL (ref 0.7–1.2)
DEPRECATED MEAN GLUCOSE BLD GHB EST-ACNC: 129 MG/DL (ref 70–126)
DEPRECATED RDW RBC AUTO: 52.6 FL (ref 35–45)
EOSINOPHIL NFR BLD AUTO: 2.3 %
EOSINOPHILS ABSOLUTE: 0.2 THOU/MM3 (ref 0–0.4)
ERYTHROCYTE [DISTWIDTH] IN BLOOD BY AUTOMATED COUNT: 19.9 % (ref 11.5–14.5)
GFR SERPL CREATININE-BSD FRML MDRD: 86 ML/MIN/1.73M2
GLUCOSE SERPL-MCNC: 113 MG/DL (ref 74–109)
HBA1C MFR BLD HPLC: 6.3 % (ref 4–6)
HCT VFR BLD AUTO: 49.8 % (ref 42–52)
HDLC SERPL-MCNC: 40 MG/DL
HGB BLD-MCNC: 15.4 GM/DL (ref 14–18)
IMM GRANULOCYTES # BLD AUTO: 0.06 THOU/MM3 (ref 0–0.07)
IMM GRANULOCYTES NFR BLD AUTO: 0.8 %
LDLC SERPL CALC-MCNC: ABNORMAL MG/DL
LYMPHOCYTES ABSOLUTE: 1.5 THOU/MM3 (ref 1–4.8)
LYMPHOCYTES NFR BLD AUTO: 20.6 %
MCH RBC QN AUTO: 24.1 PG (ref 26–33)
MCHC RBC AUTO-ENTMCNC: 30.9 GM/DL (ref 32.2–35.5)
MCV RBC AUTO: 78.1 FL (ref 80–94)
MONOCYTES ABSOLUTE: 0.6 THOU/MM3 (ref 0.4–1.3)
MONOCYTES NFR BLD AUTO: 7.4 %
NEUTROPHILS ABSOLUTE: 5.1 THOU/MM3 (ref 1.8–7.7)
NEUTROPHILS NFR BLD AUTO: 68.1 %
NRBC BLD AUTO-RTO: 0 /100 WBC
PLATELET # BLD AUTO: 276 THOU/MM3 (ref 130–400)
PMV BLD AUTO: 10.8 FL (ref 9.4–12.4)
POTASSIUM SERPL-SCNC: 4 MEQ/L (ref 3.5–5.2)
PROT SERPL-MCNC: 7.1 G/DL (ref 6.4–8.3)
RBC # BLD AUTO: 6.38 MILL/MM3 (ref 4.7–6.1)
SODIUM SERPL-SCNC: 139 MEQ/L (ref 135–145)
T4 FREE SERPL-MCNC: 0.9 NG/DL (ref 0.92–1.68)
TRIGL SERPL-MCNC: 404 MG/DL (ref 0–199)
TSH SERPL DL<=0.05 MIU/L-ACNC: 2.95 UIU/ML (ref 0.27–4.2)
WBC # BLD AUTO: 7.5 THOU/MM3 (ref 4.8–10.8)

## 2025-07-22 RX ORDER — SIMVASTATIN 40 MG
40 TABLET ORAL NIGHTLY
Qty: 90 TABLET | Refills: 1 | OUTPATIENT
Start: 2025-07-22

## 2025-07-22 RX ORDER — ESCITALOPRAM OXALATE 10 MG/1
10 TABLET ORAL DAILY
Qty: 90 TABLET | Refills: 3 | Status: SHIPPED | OUTPATIENT
Start: 2025-07-22

## 2025-07-22 RX ORDER — SIMVASTATIN 40 MG
40 TABLET ORAL NIGHTLY
Qty: 90 TABLET | Refills: 3 | Status: SHIPPED | OUTPATIENT
Start: 2025-07-22

## 2025-07-22 RX ORDER — LOSARTAN POTASSIUM AND HYDROCHLOROTHIAZIDE 12.5; 1 MG/1; MG/1
1 TABLET ORAL DAILY
Qty: 90 TABLET | Refills: 1 | OUTPATIENT
Start: 2025-07-22

## 2025-07-22 RX ORDER — ESCITALOPRAM OXALATE 10 MG/1
10 TABLET ORAL DAILY
Qty: 90 TABLET | Refills: 1 | OUTPATIENT
Start: 2025-07-22

## 2025-07-22 RX ORDER — FLUTICASONE PROPIONATE 50 MCG
2 SPRAY, SUSPENSION (ML) NASAL DAILY
Qty: 16 G | Refills: 5 | Status: SHIPPED | OUTPATIENT
Start: 2025-07-22

## 2025-07-22 RX ORDER — LOSARTAN POTASSIUM AND HYDROCHLOROTHIAZIDE 12.5; 1 MG/1; MG/1
TABLET ORAL
Qty: 90 TABLET | Refills: 3 | Status: SHIPPED | OUTPATIENT
Start: 2025-07-22

## 2025-07-22 RX ORDER — METHYLPREDNISOLONE ACETATE 80 MG/ML
80 INJECTION, SUSPENSION INTRA-ARTICULAR; INTRALESIONAL; INTRAMUSCULAR; SOFT TISSUE ONCE
Status: COMPLETED | OUTPATIENT
Start: 2025-07-22 | End: 2025-07-22

## 2025-07-22 RX ADMIN — METHYLPREDNISOLONE ACETATE 80 MG: 80 INJECTION, SUSPENSION INTRA-ARTICULAR; INTRALESIONAL; INTRAMUSCULAR; SOFT TISSUE at 11:16

## 2025-07-22 ASSESSMENT — ENCOUNTER SYMPTOMS
ABDOMINAL PAIN: 0
SHORTNESS OF BREATH: 0
BLOOD IN STOOL: 0
CHEST TIGHTNESS: 0

## 2025-07-22 NOTE — PROGRESS NOTES
Chief Complaint   Patient presents with    Annual Exam     Annual physical        SUBJECTIVE     Ryan Gaming is a 59 y.o.male    Pt presents for annual wellness physical exam.      Pt stable since last visit- no new problems for diagnoses listed below:  Patient Active Problem List   Diagnosis    Low testosterone    HTN (hypertension)    Hyperlipidemia    Allergic rhinitis    Hemorrhoid    Anxiety    KT (obstructive sleep apnea)    Class 2 severe obesity due to excess calories with serious comorbidity and body mass index (BMI) of 37.0 to 37.9 in adult (HCC)    REINOSO (dyspnea on exertion)    Abnormal EKG     History of Present Illness  The patient is a 59-year-old male who presents for his annual physical.    He reports a sensation of fullness in his ears, which he attributes to sinus issues. He does not experience itching or dizziness but describes a constant feeling of pressure in his head and ears. He has a history of ear clogging and severe sinus problems, which he believes are allergy-related. He has not sought ENT consultation or undergone a CT scan. He experiences itchy and watery eyes when mowing the lawn and has used Flonase in the past, which he found helpful. He also mentions frequent throat clearing. He takes Zyrtec as needed for allergies.    He has been experiencing fatigue and low energy levels, which he finds uncharacteristic given his usual hard-working nature. He is currently on testosterone replacement therapy, administered every 14 days, and uses a CPAP machine for sleep apnea. He has annual follow-ups with Dr. Sorto at [facility], the most recent of which was a few weeks ago. He struggles with falling asleep and has tried various sleep aids without success, although he found NyQuil to be somewhat effective. He has not tried trazodone and expresses reluctance to rely on medication for sleep. He is curious about the potential impact of his ear and sinus issues on his energy levels.      Review

## 2025-07-22 NOTE — PROGRESS NOTES
After obtaining consent, and per orders of Isabel Wick CNP, injection of Depo-Medrol 80 mg/ml 1 ml given IM left deltoid by NAT CRUZ CMA ([facility]). Patient tolerated well.

## 2025-07-22 NOTE — PATIENT INSTRUCTIONS
Melatonin for sleep. Start at 3 mg but can go to a max of 10 mg.    Pataday - allergy eye drop    Flonase or Nasacort  - sinus spray

## 2025-07-22 NOTE — TELEPHONE ENCOUNTER
This medication refill is regarding an electronic request. [facility]    Requested Prescriptions     Pending Prescriptions Disp Refills    losartan-hydroCHLOROthiazide (HYZAAR) 100-12.5 MG per tablet [Pharmacy Med Name: LOSARTAN-HCTZ 100-12.5 MG TAB] 90 tablet 1     Sig: TAKE 1 TABLET BY MOUTH EVERY DAY    simvastatin (ZOCOR) 40 MG tablet [Pharmacy Med Name: SIMVASTATIN 40 MG TABLET] 90 tablet 1     Sig: TAKE 1 TABLET BY MOUTH EVERY DAY AT NIGHT    escitalopram (LEXAPRO) 10 MG tablet [Pharmacy Med Name: ESCITALOPRAM 10 MG TABLET] 90 tablet 1     Sig: TAKE 1 TABLET BY MOUTH EVERY DAY       Date of last visit: 1/14/2025   Date of next visit: 7/22/2025    Pt has appointment today and medications will be refilled at that time.    Rx's refused.

## 2025-07-23 ENCOUNTER — OFFICE VISIT (OUTPATIENT)
Dept: UROLOGY | Age: 60
End: 2025-07-23
Payer: COMMERCIAL

## 2025-07-23 VITALS — WEIGHT: 250 LBS | HEIGHT: 69 IN | RESPIRATION RATE: 18 BRPM | BODY MASS INDEX: 37.03 KG/M2

## 2025-07-23 DIAGNOSIS — N40.0 BENIGN PROSTATIC HYPERPLASIA, UNSPECIFIED WHETHER LOWER URINARY TRACT SYMPTOMS PRESENT: ICD-10-CM

## 2025-07-23 DIAGNOSIS — R53.83 LOW ENERGY: ICD-10-CM

## 2025-07-23 DIAGNOSIS — R68.82 DECREASED LIBIDO: ICD-10-CM

## 2025-07-23 DIAGNOSIS — R79.89 LOW TESTOSTERONE: Primary | ICD-10-CM

## 2025-07-23 LAB
ALLERGEN BARLEY IGE: < 0.1 KU/L (ref 0–0.34)
ALLERGEN BEEF: < 0.1 KU/L (ref 0–0.34)
ALLERGEN BERMUDA GRASS IGE: < 0.1 KU/L (ref 0–0.34)
ALLERGEN BIRCH IGE: < 0.1 KU/L (ref 0–0.34)
ALLERGEN CABBAGE IGE: < 0.1 KU/L (ref 0–0.34)
ALLERGEN CARROT IGE: < 0.1 KU/L (ref 0–0.34)
ALLERGEN CHICKEN IGE: < 0.1 KU/L (ref 0–0.34)
ALLERGEN CODFISH IGE: < 0.1 KU/L (ref 0–0.34)
ALLERGEN CORN IGE: < 0.1 KU/L (ref 0–0.34)
ALLERGEN COW MILK IGE: 0.11 KU/L (ref 0–0.34)
ALLERGEN CRAB IGE: < 0.1 KU/L (ref 0–0.34)
ALLERGEN DOG DANDER IGE: < 0.1 KU/L (ref 0–0.34)
ALLERGEN EGG WHITE IGE: < 0.1 KU/L (ref 0–0.34)
ALLERGEN GERMAN COCKROACH IGE: 0.31 KU/L (ref 0–0.34)
ALLERGEN GRAPE IGE: < 0.1 KU/L (ref 0–0.34)
ALLERGEN HORMODENDRUM IGE: < 0.1 KUL/L (ref 0–0.34)
ALLERGEN LETTUCE IGE: < 0.1 KU/L (ref 0–0.34)
ALLERGEN MOUSE EPITHELIA IGE: < 0.1 KU/L (ref 0–0.34)
ALLERGEN NAVY BEAN: < 0.1 KU/L (ref 0–0.34)
ALLERGEN OAK TREE IGE: < 0.1 KU/L (ref 0–0.34)
ALLERGEN OAT: < 0.1 KU/L (ref 0–0.34)
ALLERGEN ORANGE IGE: < 0.1 KU/L (ref 0–0.34)
ALLERGEN PEANUT (F13) IGE: < 0.1 KU/L (ref 0–0.34)
ALLERGEN PECAN TREE IGE: < 0.1 KU/L (ref 0–0.34)
ALLERGEN PEPPER C. ANNUUM IGE: < 0.1 KU/L (ref 0–0.34)
ALLERGEN PIGWEED ROUGH IGE: < 0.1 KU/L (ref 0–0.34)
ALLERGEN PORK: < 0.1 KU/L (ref 0–0.34)
ALLERGEN RICE IGE: < 0.1 KU/L (ref 0–0.34)
ALLERGEN RYE IGE: < 0.1 KU/L (ref 0–0.34)
ALLERGEN SHEEP SORREL (W18) IGE: < 0.1 KU/L (ref 0–0.34)
ALLERGEN SOYBEAN IGE: < 0.1 KU/L (ref 0–0.34)
ALLERGEN TOMATO IGE: < 0.1 KU/L (ref 0–0.34)
ALLERGEN TREE SYCAMORE: < 0.1 KU/L (ref 0–0.34)
ALLERGEN TUNA IGE: < 0.1 KU/L (ref 0–0.34)
ALLERGEN WALNUT TREE IGE: < 0.1 KU/L (ref 0–0.34)
ALLERGEN WHEAT IGE: < 0.1 KU/L (ref 0–0.34)
ALLERGEN WHITE MULBERRY TREE, IGE: < 0.1 KU/L (ref 0–0.34)
ALLERGEN, TREE, WHITE ASH IGE: < 0.1 KU/L (ref 0–0.34)
ALTERNARIA ALTERNATA: < 0.1 KU/L (ref 0–0.34)
ASPERGILLUS FUMIGATUS: < 0.1 KU/L (ref 0–0.34)
CAT DANDER ANTIBODY: < 0.1 KU/L (ref 0–0.34)
COTTONWOOD TREE: < 0.1 KU/L (ref 0–0.34)
D. FARINAE: < 0.1 KU/L (ref 0–0.34)
D. PTERONYSSINUS: < 0.1 KU/L (ref 0–0.34)
ELM TREE: < 0.1 KU/L (ref 0–0.34)
IGE SERPL-ACNC: 57 IU/ML (ref 0–100)
IGE SERPL-ACNC: 62 IU/ML (ref 0–100)
MAPLE/BOXELDER TREE: < 0.1 KU/L (ref 0–0.34)
MOUNTAIN CEDAR TREE: < 0.1 KU/L (ref 0–0.34)
MUCOR RACEMOSUS: < 0.1 KU/L (ref 0–0.34)
P. NOTATUM: < 0.1 KU/L (ref 0–0.34)
POTATO, IGE: < 0.1 KU/L (ref 0–0.34)
RUSSIAN THISTLE: < 0.1 KU/L (ref 0–0.34)
SHORT RAGWD(A ARTEMIS.) IGE: < 0.1 KU/L (ref 0–0.34)
SHRIMP: 0.66 KU/L (ref 0–0.34)
TIMOTHY GRASS: 0.44 KU/L (ref 0–0.34)

## 2025-07-23 PROCEDURE — 99214 OFFICE O/P EST MOD 30 MIN: CPT

## 2025-07-23 RX ORDER — TESTOSTERONE CYPIONATE 200 MG/ML
200 INJECTION, SOLUTION INTRAMUSCULAR
Qty: 4 ML | Refills: 3 | Status: SHIPPED | OUTPATIENT
Start: 2025-07-23 | End: 2026-03-04

## 2025-07-23 NOTE — PROGRESS NOTES
[facility] [place] [facility]  [facility] UROLOG[facility]  [address]  [address]  [facility] [address]  Dept: [phone]  Loc: [phone]  Visit Date: 7/23/2025      HPI  Ryan Gaming is a 59 y.o. male that presents to the urology clinic for low testosterone follow-up.    Hypogonadism in Male  Symptomatic for low energy, decreased libido at baseline. Now managed on Testosterone Cypionate 200 mg/mL, 1 mL IM every 14 days. Symptoms well controlled.    No accompanying erectile dysfunction.    BPH w/ LUTS  Managed on Flomax 0.4 mg PRN. Only taking in emergent cases... prefers not to take daily.    +Family history of prostate cancer in paternal grandfather. PSA WNL. No acute elevation or prior prostate imaging or biopsies in the past.      Most Recent PSA: 0.81 (01/16/25)    TRT Monitoring Labs (07/17/25)  T: 1339 on most recent T check.  Hgb: 14.7  Hct: 47.3  LFT's: WNL    Last Total Testosterone:  Lab Results   Component Value Date    TESTOSTERONE 1339 (H) 07/17/2025         Last BUN and Creatinine:  Lab Results   Component Value Date    BUN 12 07/22/2025     Lab Results   Component Value Date    CREATININE 1.0 07/22/2025           PAST MEDICAL, FAMILY AND SOCIAL HISTORY UPDATE:  Past Medical History:   Diagnosis Date    Allergic rhinitis     BPH (benign prostatic hyperplasia)     Hyperlipidemia     Hypertension     Hypogonadism 9/12/2011    Hypogonadism male     Inflammatory spondylopathies     Testosterone deficiency      Past Surgical History:   Procedure Laterality Date    BACK SURGERY  08/05/2018    CARPAL TUNNEL RELEASE Left 2015    CERVICAL SPINE SURGERY  06/01/2015    Herniated disc    COLONOSCOPY  2011    ELBOW SURGERY  08/2011    Dr. Vicente-right elbow    HIP SURGERY Right 12/17/2023    [place]    INGUINAL HERNIA REPAIR Right 07/11/2023    KNEE SURGERY  09/01/2020    LUMBAR FUSION  08/2017    L5-S1, Dr. Cole    LUMBAR FUSION  12/17/2021    OTHER SURGICAL HISTORY  age 16

## 2025-07-24 ENCOUNTER — TELEPHONE (OUTPATIENT)
Dept: FAMILY MEDICINE CLINIC | Age: 60
End: 2025-07-24

## 2025-07-24 DIAGNOSIS — I10 ESSENTIAL HYPERTENSION: ICD-10-CM

## 2025-07-24 DIAGNOSIS — R71.8 ELEVATED RED BLOOD CELL COUNT: ICD-10-CM

## 2025-07-24 DIAGNOSIS — R73.01 IFG (IMPAIRED FASTING GLUCOSE): ICD-10-CM

## 2025-07-24 DIAGNOSIS — E78.2 MIXED HYPERLIPIDEMIA: Primary | ICD-10-CM

## 2025-07-24 NOTE — TELEPHONE ENCOUNTER
Isabel Wick, APRN - CNP  [facility] Clinical Staff  Mild allergy to shrimp and lisa grass. Otherwise allergy testing is negative.  A1C is up a little for him at 6.3  Cholesterol and triglycerides are higher than normal. Is he taking the simvastatin as prescribed?  CBC continues to show an elevated RBC but this is stable from previous  Tsh is normal but free T4 is very mildly low. Lets recheck these levels in 3 months along with an A1C, Lipid panel, and CBC.  Thanks, TS

## 2025-07-25 ASSESSMENT — ENCOUNTER SYMPTOMS
EYE DISCHARGE: 1
EYE ITCHING: 1

## 2025-07-25 NOTE — TELEPHONE ENCOUNTER
Noted.  I would recommend continue to work on diet and exercise and repeat labs in 3 months, if still elevated at that time, we may need to adjust medications.  Thanks -WS

## 2025-07-25 NOTE — TELEPHONE ENCOUNTER
Patient notified of results.  States that he does take simvastatin daily as prescribed.  Do you want to make medication adjustment or just plan repeat labs in 3 months as previously discussed.    If no change to meds, no reason to call pt back--just mail orders.    Please advise.

## 2025-08-22 RX ORDER — FLUTICASONE PROPIONATE 50 MCG
2 SPRAY, SUSPENSION (ML) NASAL DAILY
Qty: 16 G | Refills: 5 | Status: SHIPPED | OUTPATIENT
Start: 2025-08-22